# Patient Record
Sex: FEMALE | Race: WHITE | Employment: OTHER | URBAN - METROPOLITAN AREA
[De-identification: names, ages, dates, MRNs, and addresses within clinical notes are randomized per-mention and may not be internally consistent; named-entity substitution may affect disease eponyms.]

---

## 2018-10-26 RX ORDER — IBUPROFEN 200 MG
200 TABLET ORAL AS NEEDED
COMMUNITY

## 2018-10-26 NOTE — PERIOP NOTES
[facility]  Ambulatory Surgery Unit  Pre-operative Instructions    Surgery/Procedure Date  11/1/2018         Tentative Arrival Time 0730      1. On the day of your surgery/procedure, please report to the Ambulatory Surgery Unit Registration Desk and sign in at your designated time. The Ambulatory Surgery Unit is located in [facility] on the [facility] side of the [facility] across from the [address] [place] [address]. Please have all of your health insurance cards and a photo ID. 2. You must have someone with you to drive you home, as you should not drive a car for 24 hours following anesthesia. Please make arrangements for a responsible adult friend or family member to stay with you for at least the first 24 hours after your surgery. 3. Do not have anything to eat or drink (including water, gum, mints, coffee, juice) after 11:59 PM  10/31/18. This may not apply to medications prescribed by your physician. (Please note below the special instructions with medications to take the morning of surgery, if applicable.)    4. We recommend you do not drink any alcoholic beverages for 24 hours before and after your surgery. 5. Contact your surgeons office for instructions on the following medications: non-steroidal anti-inflammatory drugs (i.e. Advil, Aleve), vitamins, and supplements. (Some surgeons will want you to stop these medications prior to surgery and others may allow you to take them)   **If you are currently taking Plavix, Coumadin, Aspirin and/or other blood-thinning agents, contact your surgeon for instructions. ** Your surgeon will partner with the physician prescribing these medications to determine if it is safe to stop or if you need to continue taking. Please do not stop taking these medications without instructions from your surgeon.     6. In an effort to help prevent surgical site infection, we ask that you shower with an anti-bacterial soap (i.e. Dial or Safeguard) for 3 days prior to and on the morning of surgery, using a fresh towel after each shower. (Please begin this process with fresh bed linens.) Do not apply any lotions, powders, or deodorants after the shower on the day of your procedure. If applicable, please do not shave the operative site for 48 hours prior to surgery. 7. Wear comfortable clothes. Wear glasses instead of contacts. Do not bring any jewelry or money (other than copays or fees as instructed). Do not wear make-up, particularly mascara, the morning of your surgery. Do not wear nail polish, particularly if you are having foot /hand surgery. Wear your hair loose or down, no ponytails, buns, santana pins or clips. All body piercings must be removed. 8. You should understand that if you do not follow these instructions your surgery may be cancelled. If your physical condition changes (i.e. fever, cold or flu) please contact your surgeon as soon as possible. 9. It is important that you be on time. If a situation occurs where you may be late, or if you have any questions or problems, please call [phone].    10. Your surgery time may be subject to change. You will receive a phone call the day prior to surgery to confirm your arrival time. 11. Pediatric patients: please bring a change of clothes, diapers, bottle/sippy cup, pacifier, etc.      Special Instructions: Take all medications and inhalers, as prescribed, on the morning of surgery with a sip of water EXCEPT:       Insulin Dependent Diabetic patients: Take your diabetic medications as prescribed the day before surgery. Hold all diabetic medications the day of surgery. If you are scheduled to arrive for surgery after 8:00 AM, and your AM blood sugar is >200, please call Ambulatory Surgery. I understand a pre-operative phone call will be made to verify my surgery time.   In the event that I am not available, I give permission for a message to be left on my answering service and/or with another person?       [phone]    The above note was reviewed with patient during PAT phone call on 10/26/18, patient verbalized understanding.            ___________________      ___________________      ________________  (Signature of Patient)          (Witness)                   (Date and Time)

## 2018-10-31 ENCOUNTER — ANESTHESIA EVENT (OUTPATIENT)
Dept: SURGERY | Age: 78
End: 2018-10-31
Payer: MEDICARE

## 2018-11-01 ENCOUNTER — HOSPITAL ENCOUNTER (OUTPATIENT)
Age: 78
Setting detail: OUTPATIENT SURGERY
Discharge: HOME | End: 2018-11-01
Attending: ORTHOPAEDIC SURGERY | Admitting: ORTHOPAEDIC SURGERY
Payer: MEDICARE

## 2018-11-01 ENCOUNTER — ANESTHESIA (OUTPATIENT)
Dept: SURGERY | Age: 78
End: 2018-11-01
Payer: MEDICARE

## 2018-11-01 VITALS
TEMPERATURE: 97.5 F | RESPIRATION RATE: 12 BRPM | WEIGHT: 144 LBS | SYSTOLIC BLOOD PRESSURE: 110 MMHG | BODY MASS INDEX: 24.59 KG/M2 | HEIGHT: 64 IN | OXYGEN SATURATION: 97 % | DIASTOLIC BLOOD PRESSURE: 76 MMHG | HEART RATE: 77 BPM

## 2018-11-01 DIAGNOSIS — G89.18 POSTOPERATIVE PAIN: Primary | ICD-10-CM

## 2018-11-01 PROCEDURE — 77030028224 HC PDNG CST BSNM -A: Performed by: ORTHOPAEDIC SURGERY

## 2018-11-01 PROCEDURE — 77030020255 HC SOL INJ LR 1000ML BG: Performed by: ORTHOPAEDIC SURGERY

## 2018-11-01 PROCEDURE — 74011000250 HC RX REV CODE- 250: Performed by: ORTHOPAEDIC SURGERY

## 2018-11-01 PROCEDURE — 76210000040 HC AMBSU PH I REC FIRST 0.5 HR: Performed by: ORTHOPAEDIC SURGERY

## 2018-11-01 PROCEDURE — 76030000000 HC AMB SURG OR TIME 0.5 TO 1: Performed by: ORTHOPAEDIC SURGERY

## 2018-11-01 PROCEDURE — 77030002916 HC SUT ETHLN J&J -A: Performed by: ORTHOPAEDIC SURGERY

## 2018-11-01 PROCEDURE — 74011250636 HC RX REV CODE- 250/636: Performed by: ORTHOPAEDIC SURGERY

## 2018-11-01 PROCEDURE — 77030018836 HC SOL IRR NACL ICUM -A: Performed by: ORTHOPAEDIC SURGERY

## 2018-11-01 PROCEDURE — 74011250636 HC RX REV CODE- 250/636

## 2018-11-01 PROCEDURE — 76210000050 HC AMBSU PH II REC 0.5 TO 1 HR: Performed by: ORTHOPAEDIC SURGERY

## 2018-11-01 PROCEDURE — 77030021352 HC CBL LD SYS DISP COVD -B: Performed by: ORTHOPAEDIC SURGERY

## 2018-11-01 PROCEDURE — 77030006689 HC BLD OPHTH BVR BD -A: Performed by: ORTHOPAEDIC SURGERY

## 2018-11-01 PROCEDURE — 76060000061 HC AMB SURG ANES 0.5 TO 1 HR: Performed by: ORTHOPAEDIC SURGERY

## 2018-11-01 PROCEDURE — 74011250636 HC RX REV CODE- 250/636: Performed by: ANESTHESIOLOGY

## 2018-11-01 RX ORDER — MIDAZOLAM HYDROCHLORIDE 1 MG/ML
INJECTION, SOLUTION INTRAMUSCULAR; INTRAVENOUS AS NEEDED
Status: DISCONTINUED | OUTPATIENT
Start: 2018-11-01 | End: 2018-11-01 | Stop reason: HOSPADM

## 2018-11-01 RX ORDER — HYDROMORPHONE HYDROCHLORIDE 1 MG/ML
.2-.5 INJECTION, SOLUTION INTRAMUSCULAR; INTRAVENOUS; SUBCUTANEOUS ONCE
Status: DISCONTINUED | OUTPATIENT
Start: 2018-11-01 | End: 2018-11-01 | Stop reason: HOSPADM

## 2018-11-01 RX ORDER — ONDANSETRON 2 MG/ML
4 INJECTION INTRAMUSCULAR; INTRAVENOUS AS NEEDED
Status: DISCONTINUED | OUTPATIENT
Start: 2018-11-01 | End: 2018-11-01 | Stop reason: HOSPADM

## 2018-11-01 RX ORDER — DIPHENHYDRAMINE HYDROCHLORIDE 50 MG/ML
12.5 INJECTION, SOLUTION INTRAMUSCULAR; INTRAVENOUS AS NEEDED
Status: DISCONTINUED | OUTPATIENT
Start: 2018-11-01 | End: 2018-11-01 | Stop reason: HOSPADM

## 2018-11-01 RX ORDER — PROPOFOL 10 MG/ML
INJECTION, EMULSION INTRAVENOUS
Status: DISCONTINUED | OUTPATIENT
Start: 2018-11-01 | End: 2018-11-01 | Stop reason: HOSPADM

## 2018-11-01 RX ORDER — ONDANSETRON 2 MG/ML
INJECTION INTRAMUSCULAR; INTRAVENOUS AS NEEDED
Status: DISCONTINUED | OUTPATIENT
Start: 2018-11-01 | End: 2018-11-01 | Stop reason: HOSPADM

## 2018-11-01 RX ORDER — SODIUM CHLORIDE, SODIUM LACTATE, POTASSIUM CHLORIDE, CALCIUM CHLORIDE 600; 310; 30; 20 MG/100ML; MG/100ML; MG/100ML; MG/100ML
25 INJECTION, SOLUTION INTRAVENOUS CONTINUOUS
Status: DISCONTINUED | OUTPATIENT
Start: 2018-11-01 | End: 2018-11-01 | Stop reason: HOSPADM

## 2018-11-01 RX ORDER — CLINDAMYCIN PHOSPHATE 900 MG/50ML
900 INJECTION INTRAVENOUS ONCE
Status: COMPLETED | OUTPATIENT
Start: 2018-11-01 | End: 2018-11-01

## 2018-11-01 RX ORDER — SODIUM CHLORIDE 0.9 % (FLUSH) 0.9 %
5-10 SYRINGE (ML) INJECTION AS NEEDED
Status: DISCONTINUED | OUTPATIENT
Start: 2018-11-01 | End: 2018-11-01 | Stop reason: HOSPADM

## 2018-11-01 RX ORDER — FENTANYL CITRATE 50 UG/ML
INJECTION, SOLUTION INTRAMUSCULAR; INTRAVENOUS AS NEEDED
Status: DISCONTINUED | OUTPATIENT
Start: 2018-11-01 | End: 2018-11-01 | Stop reason: HOSPADM

## 2018-11-01 RX ORDER — TRAMADOL HYDROCHLORIDE 50 MG/1
50 TABLET ORAL
Qty: 25 TAB | Refills: 0 | Status: SHIPPED | OUTPATIENT
Start: 2018-11-01 | End: 2021-01-13

## 2018-11-01 RX ORDER — PHENYLEPHRINE HCL IN 0.9% NACL 0.4MG/10ML
SYRINGE (ML) INTRAVENOUS AS NEEDED
Status: DISCONTINUED | OUTPATIENT
Start: 2018-11-01 | End: 2018-11-01 | Stop reason: HOSPADM

## 2018-11-01 RX ORDER — SODIUM CHLORIDE 0.9 % (FLUSH) 0.9 %
5-10 SYRINGE (ML) INJECTION EVERY 8 HOURS
Status: DISCONTINUED | OUTPATIENT
Start: 2018-11-01 | End: 2018-11-01 | Stop reason: HOSPADM

## 2018-11-01 RX ORDER — MORPHINE SULFATE 10 MG/ML
2 INJECTION, SOLUTION INTRAMUSCULAR; INTRAVENOUS
Status: DISCONTINUED | OUTPATIENT
Start: 2018-11-01 | End: 2018-11-01 | Stop reason: HOSPADM

## 2018-11-01 RX ORDER — FENTANYL CITRATE 50 UG/ML
25 INJECTION, SOLUTION INTRAMUSCULAR; INTRAVENOUS
Status: DISCONTINUED | OUTPATIENT
Start: 2018-11-01 | End: 2018-11-01 | Stop reason: HOSPADM

## 2018-11-01 RX ORDER — LIDOCAINE HYDROCHLORIDE 10 MG/ML
0.1 INJECTION, SOLUTION EPIDURAL; INFILTRATION; INTRACAUDAL; PERINEURAL AS NEEDED
Status: DISCONTINUED | OUTPATIENT
Start: 2018-11-01 | End: 2018-11-01 | Stop reason: HOSPADM

## 2018-11-01 RX ORDER — OXYCODONE AND ACETAMINOPHEN 5; 325 MG/1; MG/1
1 TABLET ORAL
Status: DISCONTINUED | OUTPATIENT
Start: 2018-11-01 | End: 2018-11-01 | Stop reason: HOSPADM

## 2018-11-01 RX ORDER — CLINDAMYCIN PHOSPHATE 900 MG/50ML
INJECTION INTRAVENOUS
Status: COMPLETED
Start: 2018-11-01 | End: 2018-11-01

## 2018-11-01 RX ADMIN — ONDANSETRON 4 MG: 2 INJECTION INTRAMUSCULAR; INTRAVENOUS at 09:22

## 2018-11-01 RX ADMIN — Medication 80 MCG: at 09:14

## 2018-11-01 RX ADMIN — FENTANYL CITRATE 25 MCG: 50 INJECTION, SOLUTION INTRAMUSCULAR; INTRAVENOUS at 09:03

## 2018-11-01 RX ADMIN — CLINDAMYCIN PHOSPHATE 900 MG: 18 INJECTION, SOLUTION INTRAVENOUS at 09:00

## 2018-11-01 RX ADMIN — PROPOFOL 50 MCG/KG/MIN: 10 INJECTION, EMULSION INTRAVENOUS at 08:56

## 2018-11-01 RX ADMIN — FENTANYL CITRATE 50 MCG: 50 INJECTION, SOLUTION INTRAMUSCULAR; INTRAVENOUS at 09:14

## 2018-11-01 RX ADMIN — SODIUM CHLORIDE, SODIUM LACTATE, POTASSIUM CHLORIDE, AND CALCIUM CHLORIDE 25 ML/HR: 600; 310; 30; 20 INJECTION, SOLUTION INTRAVENOUS at 07:37

## 2018-11-01 RX ADMIN — Medication 120 MCG: at 09:16

## 2018-11-01 RX ADMIN — Medication 120 MCG: at 09:18

## 2018-11-01 RX ADMIN — MIDAZOLAM HYDROCHLORIDE 1 MG: 1 INJECTION, SOLUTION INTRAMUSCULAR; INTRAVENOUS at 08:50

## 2018-11-01 RX ADMIN — FENTANYL CITRATE 25 MCG: 50 INJECTION, SOLUTION INTRAMUSCULAR; INTRAVENOUS at 09:07

## 2018-11-01 RX ADMIN — MIDAZOLAM HYDROCHLORIDE 1 MG: 1 INJECTION, SOLUTION INTRAMUSCULAR; INTRAVENOUS at 08:53

## 2018-11-01 NOTE — OP NOTES
PATIENT NAME:  Rich Yuen    SURGEON:  Vargas Millan MD    DATE OF SURGERY:  11/1/2018    LOCATION: [facility]    PREOPERATIVE DIAGNOSIS:  Right carpal tunnel syndrome    POSTOPERATIVE DIAGNOSIS:  Same    PROCEDURE: Right Open carpal tunnel decompression             ANESTHESIA:  Local (1% lidocaine with 0.25% bupivicaine in a 1:1 mixture) with sedation     BLOOD LOSS:  Minimal    TOURNIQUET TIME:  13 min    OPERATIVE INDICATIONS: The patient is a 70 y.o. old female who has developed progressive right carpal tunnel syndrome, unresponsive to all conservative treatment. The patient has classic symptoms and signs of median nerve entrapment including median distribution paresthesias and numbness, nocturnal wakening, positive Tinel's sign, and positive wrist flexion test. Symptoms have failed to respond consistently to conservative treatment such that patient has elected to undergo carpal tunnel decompression. She understands the alternatives to surgery, the nature of this elective procedure, the usual recovery, possible variations in healing, and the potential for shortcomings and complications (including but not exclusive to bleeding, infection, scar tenderness,  weakness, residual numbness, or thenar muscle weakness). DESCRIPTION  OF PROCEDURE: Patient identified correctly in the pre-operative holding area and correct extremity marked. Was then taken stable to the operating room and placed supine with the operative extremity on a hand table. After sedation was administered by the anesthesia team, the right hand and forearm were prepped and draped in a sterile field. A timeout was taken and the operative site was confirmed. Local anesthesia was instilled into the wound. The extremity was then elevated and exsanguinated and an upper arm tourniquet was inflated to 250 mm of mercury.  An incision was made in the proximal palm in line with the radial side of the ring finger from the distal wrist crease to Kaplans line. Dissection was carried through the subcutaneous tissue and the transverse carpal ligament was visualized. This was released under direct visualization first distally followed by proximally and carried up past the wrist wrist crease. A complete decompression was confirmed by direct visualization of the decompressed median nerve as well as palpation. No other synovial pathology was noted. The tourniquet was then released. The nerve was noted to become hyperemic. Copious irrigation was performed. Hemostasis was obtained with bipolar cautery and the wound was closed with 3-0 nylon sutures. A sterile dressing was then applied leaving the fingers free for range of motion. The patient tolerated the procedure well and was discharged to the recovery area uneventfully. All instrument needle and lap counts were correct at the end of the case.

## 2018-11-01 NOTE — H&P
This is a 56-year-old right-hand-dominant female who presents today with right equal left numbness in the hands. History of a diagnosis of a carpal tunnel syndrome on the left with a remote injection. Reports numbness in all fingers. Wears gloves at night. Numbness is constant in nature. No history of diabetes. Does have a history of a C-spine infection with a complex surgical history regarding the cervical spine. PMH, PSH, ROS reviewed on intake form     Objective:   Constitutional:  No acute distress. Well nourished. Well developed. Eyes:  Sclera are nonicteric. Respiratory:  No labored breathing. Cardiovascular:  No marked edema. Skin:  No marked skin ulcers. Neurological:  see below  Psychiatric: Alert and oriented x3. Musculoskeletal   Examination of bilateral upper extremities demonstrates she does have decreased sensation in the median nerve distribution. Positive Tinel's and positive Durkan's at the wrist.  Does have thenar atrophy bilaterally. No radicular pain with limited neck motion.     Radiographs:       No imaging obtained      Assessment:      1. Bilateral carpal tunnel syndrome          Plan:   Her constant numbness in thenar atrophy is concerning. We will get an EMG to further evaluate. Did discuss surgical management with her today. We will call her with the EMG results. Addendum: EMG obtained and notable for severe bilateral CTS with signs of axonal degeneration in the APB. R/b/a of open CTR d/w patient and she consents to proceed. Date of Surgery Update:  Melly Sotelo was seen and examined. History and physical has been reviewed. The patient has been examined.  There have been no significant clinical changes since the completion of the originally dated History and Physical.    Signed By: Arlin Ryan MD     November 1, 2018 8:42 AM

## 2018-11-01 NOTE — PERIOP NOTES
Iker uYen  1940  [id number]    Situation:  Verbal report given from: Spike Conklin RN  Procedure: Procedure(s):  RIGHT OPEN CARPAL TUNNEL RELEASE (MAC WITH LOCAL)    Background:    Preoperative diagnosis: BILATERAL CARPAL TUNNEL SYNDROME    Postoperative diagnosis: BILATERAL CARPAL TUNNEL SYNDROME    :  Dr. Miah Feliciano    Assistant(s): Circ-1: Jimbo Salomon RN  Scrub Tech-1: Genette Harada    Specimens: * No specimens in log *    Assessment:  Intra-procedure medications         Anesthesia gave intra-procedure sedation and medications, see anesthesia flow sheet     Intravenous fluids: LR@ KVO     Vital signs stable       Recommendation:

## 2018-11-01 NOTE — PERIOP NOTES
Permission received to review discharge instructions and discuss private health information with Tiffanie Beckman, . 0940- at bedside, updated on pt's status. VSS. Pt denies pain    0944-Pt tolerating ginger ale w/o difficulty. Pt continues to deny pain. VSS    1001-D/c instructions reviewed, all questions answered. VSS, pt continues to deny pain. 1015-Transported via w/c to awaiting transportation.

## 2018-11-01 NOTE — ANESTHESIA PREPROCEDURE EVALUATION
Anesthetic History No history of anesthetic complications Review of Systems / Medical History Patient summary reviewed, nursing notes reviewed and pertinent labs reviewed Pulmonary Within defined limits Neuro/Psych Within defined limits Cardiovascular Exercise tolerance: >4 METS 
  
GI/Hepatic/Renal 
Within defined limits Endo/Other Arthritis Other Findings Comments: H/o cervical fusion d/t infected bone Physical Exam 
 
Airway Mallampati: III 
TM Distance: 4 - 6 cm Neck ROM: decreased range of motion Mouth opening: Normal 
 
 Cardiovascular Rhythm: regular Rate: normal 
 
 
 
 Dental 
No notable dental hx Pulmonary Breath sounds clear to auscultation Abdominal 
GI exam deferred Other Findings Anesthetic Plan ASA: 2 Anesthesia type: MAC Induction: Intravenous Anesthetic plan and risks discussed with: Patient

## 2018-11-01 NOTE — PERIOP NOTES
Permission received to review discharge instructions and discuss private health information with Jose De Jesus Piper, .

## 2018-11-01 NOTE — ANESTHESIA POSTPROCEDURE EVALUATION
Procedure(s): RIGHT OPEN CARPAL TUNNEL RELEASE (MAC WITH LOCAL). Anesthesia Post Evaluation Multimodal analgesia: multimodal analgesia used between 6 hours prior to anesthesia start to PACU discharge Patient location during evaluation: bedside Patient participation: complete - patient participated Level of consciousness: awake and alert Pain score: 0 Airway patency: patent Anesthetic complications: no 
Cardiovascular status: hemodynamically stable Respiratory status: room air and spontaneous ventilation Hydration status: acceptable Visit Vitals /76 (BP 1 Location: Left arm, BP Patient Position: At rest) Pulse 77 Temp 36.4 °C (97.5 °F) Resp 12 Ht 5' 4\" (1.626 m) Wt 65.3 kg (144 lb) SpO2 97% BMI 24.72 kg/m²

## 2020-09-30 ENCOUNTER — OFFICE VISIT (OUTPATIENT)
Dept: INTERNAL MEDICINE CLINIC | Age: 80
End: 2020-09-30
Payer: MEDICARE

## 2020-09-30 VITALS
RESPIRATION RATE: 16 BRPM | DIASTOLIC BLOOD PRESSURE: 70 MMHG | BODY MASS INDEX: 23.52 KG/M2 | SYSTOLIC BLOOD PRESSURE: 126 MMHG | OXYGEN SATURATION: 95 % | WEIGHT: 137 LBS | HEART RATE: 89 BPM | TEMPERATURE: 97.1 F

## 2020-09-30 DIAGNOSIS — G31.84 MILD COGNITIVE IMPAIRMENT: ICD-10-CM

## 2020-09-30 DIAGNOSIS — Z78.0 POST-MENOPAUSAL: ICD-10-CM

## 2020-09-30 DIAGNOSIS — Z12.2 ENCOUNTER FOR SCREENING FOR LUNG CANCER: ICD-10-CM

## 2020-09-30 DIAGNOSIS — R73.02 IMPAIRED GLUCOSE TOLERANCE: ICD-10-CM

## 2020-09-30 DIAGNOSIS — Z87.891 FORMER SMOKER: ICD-10-CM

## 2020-09-30 DIAGNOSIS — E55.9 VITAMIN D DEFICIENCY: Primary | ICD-10-CM

## 2020-09-30 DIAGNOSIS — Z12.11 ENCOUNTER FOR COLORECTAL CANCER SCREENING: ICD-10-CM

## 2020-09-30 DIAGNOSIS — E78.01 FAMILIAL HYPERCHOLESTEREMIA: ICD-10-CM

## 2020-09-30 DIAGNOSIS — Z12.12 ENCOUNTER FOR COLORECTAL CANCER SCREENING: ICD-10-CM

## 2020-09-30 DIAGNOSIS — Z12.31 BREAST CANCER SCREENING BY MAMMOGRAM: ICD-10-CM

## 2020-09-30 DIAGNOSIS — R68.89 OTHER GENERAL SYMPTOMS AND SIGNS: ICD-10-CM

## 2020-09-30 DIAGNOSIS — R53.82 CHRONIC FATIGUE: ICD-10-CM

## 2020-09-30 DIAGNOSIS — Z79.2 PROPHYLACTIC ANTIBIOTIC: ICD-10-CM

## 2020-09-30 LAB
25(OH)D3 SERPL-MCNC: 39 NG/ML (ref 30–96)
A-G RATIO,AGRAT: 1.6 RATIO
ALBUMIN SERPL-MCNC: 4.4 G/DL (ref 3.9–5.4)
ALP SERPL-CCNC: 76 U/L (ref 38–126)
ALT SERPL-CCNC: 21 U/L (ref 0–35)
ANION GAP SERPL CALC-SCNC: 10 MMOL/L
AST SERPL W P-5'-P-CCNC: 28 U/L (ref 14–36)
BILIRUB SERPL-MCNC: 0.5 MG/DL (ref 0.2–1.3)
BUN SERPL-MCNC: 23 MG/DL (ref 7–17)
BUN/CREATININE RATIO,BUCR: 33 RATIO
CALCIUM SERPL-MCNC: 9.7 MG/DL (ref 8.4–10.2)
CHLORIDE SERPL-SCNC: 106 MMOL/L (ref 98–107)
CHOL/HDL RATIO,CHHD: 2 RATIO (ref 0–4)
CHOLEST SERPL-MCNC: 231 MG/DL (ref 0–200)
CO2 SERPL-SCNC: 28 MMOL/L (ref 22–32)
CREAT SERPL-MCNC: 0.7 MG/DL (ref 0.7–1.2)
ERYTHROCYTE [DISTWIDTH] IN BLOOD BY AUTOMATED COUNT: 12.7 %
GLOBULIN,GLOB: 2.8
GLUCOSE SERPL-MCNC: 121 MG/DL (ref 65–105)
HBA1C MFR BLD HPLC: 5.8 % (ref 4–5.7)
HCT VFR BLD AUTO: 47.5 % (ref 37–51)
HDLC SERPL-MCNC: 93 MG/DL (ref 35–130)
HGB BLD-MCNC: 15.5 G/DL (ref 12–18)
LDL/HDL RATIO,LDHD: 1 RATIO
LDLC SERPL CALC-MCNC: 106 MG/DL (ref 0–130)
LYMPHOCYTES ABSOLUTE: 2.1 K/UL (ref 0.6–4.1)
LYMPHOCYTES NFR BLD: 26 % (ref 10–58.5)
MCH RBC QN AUTO: 30.8 PG (ref 26–32)
MCHC RBC AUTO-ENTMCNC: 32.6 G/DL (ref 30–36)
MCV RBC AUTO: 94.2 FL (ref 80–97)
MONOCYTES ABS-DIF,2141: 0.7 K/UL (ref 0–1.8)
MONOCYTES NFR BLD: 8.3 % (ref 0.1–24)
NEUTROPHILS # BLD: 65.7 % (ref 37–92)
NEUTROPHILS ABS,2156: 5.2 K/UL (ref 2–7.8)
PLATELET # BLD AUTO: 277 K/UL (ref 140–440)
POTASSIUM SERPL-SCNC: 4.5 MMOL/L (ref 3.6–5)
PROT SERPL-MCNC: 7.2 G/DL (ref 6.3–8.2)
RBC # BLD AUTO: 5.04 M/UL (ref 4.2–6.3)
SODIUM SERPL-SCNC: 144 MMOL/L (ref 137–145)
TRIGL SERPL-MCNC: 158 MG/DL (ref 0–200)
TSH SERPL DL<=0.05 MIU/L-ACNC: 1.22 UIU/ML (ref 0.34–5.6)
VLDLC SERPL CALC-MCNC: 32 MG/DL
WBC # BLD AUTO: 7.9 K/UL (ref 4.1–10.9)

## 2020-09-30 PROCEDURE — 1100F PTFALLS ASSESS-DOCD GE2>/YR: CPT | Performed by: INTERNAL MEDICINE

## 2020-09-30 PROCEDURE — G0444 DEPRESSION SCREEN ANNUAL: HCPCS | Performed by: INTERNAL MEDICINE

## 2020-09-30 PROCEDURE — 80053 COMPREHEN METABOLIC PANEL: CPT | Performed by: INTERNAL MEDICINE

## 2020-09-30 PROCEDURE — 3288F FALL RISK ASSESSMENT DOCD: CPT | Performed by: INTERNAL MEDICINE

## 2020-09-30 PROCEDURE — G8510 SCR DEP NEG, NO PLAN REQD: HCPCS | Performed by: INTERNAL MEDICINE

## 2020-09-30 PROCEDURE — 85025 COMPLETE CBC W/AUTO DIFF WBC: CPT | Performed by: INTERNAL MEDICINE

## 2020-09-30 PROCEDURE — G8427 DOCREV CUR MEDS BY ELIG CLIN: HCPCS | Performed by: INTERNAL MEDICINE

## 2020-09-30 PROCEDURE — 1090F PRES/ABSN URINE INCON ASSESS: CPT | Performed by: INTERNAL MEDICINE

## 2020-09-30 PROCEDURE — G8400 PT W/DXA NO RESULTS DOC: HCPCS | Performed by: INTERNAL MEDICINE

## 2020-09-30 PROCEDURE — G8420 CALC BMI NORM PARAMETERS: HCPCS | Performed by: INTERNAL MEDICINE

## 2020-09-30 PROCEDURE — 84443 ASSAY THYROID STIM HORMONE: CPT | Performed by: INTERNAL MEDICINE

## 2020-09-30 PROCEDURE — 80061 LIPID PANEL: CPT | Performed by: INTERNAL MEDICINE

## 2020-09-30 PROCEDURE — G8536 NO DOC ELDER MAL SCRN: HCPCS | Performed by: INTERNAL MEDICINE

## 2020-09-30 PROCEDURE — 83036 HEMOGLOBIN GLYCOSYLATED A1C: CPT | Performed by: INTERNAL MEDICINE

## 2020-09-30 PROCEDURE — 99215 OFFICE O/P EST HI 40 MIN: CPT | Performed by: INTERNAL MEDICINE

## 2020-09-30 PROCEDURE — 82306 VITAMIN D 25 HYDROXY: CPT | Performed by: INTERNAL MEDICINE

## 2020-09-30 RX ORDER — CLINDAMYCIN HYDROCHLORIDE 150 MG/1
600 CAPSULE ORAL ONCE
Qty: 4 CAP | Refills: 0 | Status: SHIPPED | OUTPATIENT
Start: 2020-09-30 | End: 2020-09-30

## 2020-09-30 NOTE — PROGRESS NOTES
Kassi Rivas is a 70 y.o. female and presents with Establish Care      Subjective:  Mayo Zapien is a new patient to our practice. She is here to establish care. 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 She is in attendance with her granddaughter Cuco Paula. Patient reports she has not seen a family physician for over 10 years now. She has not had routine screenings done and is long overdue for her routine mammogram, Pap smear, colonoscopy, DEXA scan. She has not had lab work done in a long while. She has noticed some mild cognitive impairment and issues with short-term memory loss. She has been taking PreviDent. She has not seen a neurologist.  No head CT or MRI was ever done. She is still driving and pretty independent in caring her ADLs. She had a fall in August and hurt her right arm and right side of her leg. Denies any fractures. She still has some pain with flexion extension and restricted range of motion. Past Medical History:   Diagnosis Date    Arthritis     bilateral hands, left knee - pt got injections    Carpal tunnel syndrome     bilateral hands    Dental infection ~1998    progressed to back of neck, pt had sx that consisted of taking bone from her right hip to replace in her spinal coloum; pt in halo for 3 months     Past Surgical History:   Procedure Laterality Date    HX CARPAL TUNNEL RELEASE Right 11/01/2018    open    HX HYSTERECTOMY      HX TONSILLECTOMY      HX UROLOGICAL  ~1998    Back surgery:  pt reports she had dental inf. that went to back of neck and ortho surgeon took pieces of bone from right hip to replace in spinal column; pt reports she wore halo for 3 months:  Dr. George Cartagena (sx done at [facility])     Allergies   Allergen Reactions    Penicillins Shortness of Breath and Rash     Current Outpatient Medications   Medication Sig Dispense Refill    OTHER daily. Prevagen for memory      clindamycin (CLEOCIN) 150 mg capsule Take 4 Caps by mouth once for 1 dose. 30-60 minutes prior to dental procedure. 4 Cap 0    ibuprofen (ADVIL) 200 mg tablet Take 200 mg by mouth as needed for Pain.  traMADol (ULTRAM) 50 mg tablet Take 1 Tab by mouth every six (6) hours as needed for Pain. Max Daily Amount: 200 mg. 25 Tab 0     Social History     Socioeconomic History    Marital status:      Spouse name: Not on file    Number of children: Not on file    Years of education: Not on file    Highest education level: Not on file   Tobacco Use    Smoking status: Former Smoker     Last attempt to quit:      Years since quittin.7    Smokeless tobacco: Never Used   Substance and Sexual Activity    Alcohol use: Yes     Alcohol/week: 3.0 standard drinks     Types: 3 Glasses of wine per week    Drug use: No     Family History   Problem Relation Age of Onset    Cancer Daughter     Parkinson's Disease Mother        Review of Systems  ROS is unremarkable except for ones highlighted in bold.    Constitutional: negative for fevers, chills, anorexia and weight loss  Eyes:   negative for visual disturbance and irritation  ENT:   negative for tinnitus,sore throat,nasal congestion,ear pain,hoarseness  Respiratory:  negative for cough, hemoptysis, dyspnea,wheezing  CV:   negative for chest pain, palpitations, lower extremity edema  GI:   negative for nausea, vomiting, diarrhea, abdominal pain,melena  Endo:               negative for polyuria,polydipsia,polyphagia,heat intolerance  Genitourinary: negative for frequency, dysuria and hematuria  Integumentary: negative for rash and pruritus  Hematologic:  negative for easy bruising and gum/nose bleeding  Musculoskel: negative for myalgias, arthralgias, back pain, muscle weakness, joint pain  Neurological:  negative for headaches, dizziness, vertigo, memory problems and gait   Behavl/Psych: negative for feelings of anxiety, depression, mood changes  ROS otherwise negative     Objective:  Visit Vitals  /70 (BP 1 Location: Left arm, BP Patient Position: Sitting)   Pulse 89   Temp 97.1 °F (36.2 °C)   Resp 16   Wt 137 lb (62.1 kg)   SpO2 95%   BMI 23.52 kg/m²     Physical Exam:   General appearance - alert, well appearing, and in no distress  Mental status - alert, oriented to person, place, and time  EYE-AMELIE, EOMI, fundi normal, corneas normal, no foreign bodies  ENT-ENT exam normal, no neck nodes or sinus tenderness  Nose - normal and patent, no erythema, discharge or polyps  Mouth - mucous membranes moist, pharynx normal without lesions  Neck - supple, no significant adenopathy   Chest - clear to auscultation, no wheezes, rales or rhonchi, symmetric air entry   Heart - normal rate, regular rhythm, normal S1, S2, no murmurs, rubs, clicks or gallops   Abdomen - soft, nontender, nondistended, no masses or organomegaly  Lymph- no adenopathy palpable  Ext-peripheral pulses normal, no pedal edema, no clubbing or cyanosis  Skin-Warm and dry. no hyperpigmentation, vitiligo, or suspicious lesions  Neuro -alert, oriented, normal speech, no focal findings or movement disorder noted  Musculoskeletal- FROM, no bony abnormalities, right hand point tenderness,    Lab Review:  No results found for this or any previous visit. Documenation Review:    Assessment/Plan:    Diagnoses and all orders for this visit:    1. Vitamin D deficiency  -     VITAMIN D, 25 HYDROXY    2. Familial hypercholesteremia  -     LIPID PANEL    3. Impaired glucose tolerance  -     HEMOGLOBIN A1C W/O EAG    4. Chronic fatigue  -     CBC WITH AUTOMATED DIFF  -     METABOLIC PANEL, COMPREHENSIVE  -     TSH 3RD GENERATION    5. Post-menopausal  -     DEXA BONE DENSITY STUDY AXIAL; Future  -     REFERRAL TO OBSTETRICS AND GYNECOLOGY    6. Breast cancer screening by mammogram  -     [facility] MAMMO BI SCREENING INCL CAD; Future    7.  Encounter for colorectal cancer screening  -     REFERRAL TO COLON AND RECTAL SURGERY    8. Prophylactic antibiotic  -     clindamycin (CLEOCIN) 150 mg capsule; Take 4 Caps by mouth once for 1 dose. 30-60 minutes prior to dental procedure. 9. Encounter for screening for lung cancer  -     CT LOW DOSE LUNG CANCER SCREENING; Future    10. Former smoker  -     CT LOW DOSE LUNG CANCER SCREENING; Future    11. Mild cognitive impairment  -     VITAMIN B12    12. Other general symptoms and signs   -     VITAMIN B12        More than 60  minutes of time was spent in direct patient care, out of which >50% of time was spent on coordination of care, counseling on diagnosis, management and treatment of the diagnosis. I did discuss about the need for all screening and immunization needs. Orders and referral placed. All the above. She also has been having short-term memory loss/mild cognitive impairment. Discussed with both patient and her granddaughter. Will do baseline labs and work-up. She voiced understanding. I have instructed her to take as needed Advil/Aleve for pain in her right hand and right leg. She will certainly call me back in 2 weeks if her symptoms have not improved. We can entertain x-rays at that point. I will call with lab results and make further recommendations or adjustments if necessary. Discussed lifestyle modifications including Na restriction, low carb/fat diet, weight reduction and exercise (at least a walking program). We will rule out metabolic causes for mild cognitive impairment. If unremarkable, might benefit from neuropsych testing/neurology evaluation. ICD-10-CM ICD-9-CM    1. Vitamin D deficiency  E55.9 268.9 VITAMIN D, 25 HYDROXY   2. Familial hypercholesteremia  E78.01 272.0 LIPID PANEL   3. Impaired glucose tolerance  R73.02 790.22 HEMOGLOBIN A1C W/O EAG   4. Chronic fatigue  R53.82 780.79 CBC WITH AUTOMATED DIFF      METABOLIC PANEL, COMPREHENSIVE      TSH 3RD GENERATION   5.  Post-menopausal  Z78.0 V49.81 DEXA BONE DENSITY STUDY AXIAL      REFERRAL TO OBSTETRICS AND GYNECOLOGY   6. Breast cancer screening by mammogram  Z12.31 V76.12 [facility] MAMMO BI SCREENING INCL CAD   7. Encounter for colorectal cancer screening  Z12.11 V76.51 REFERRAL TO COLON AND RECTAL SURGERY    Z12.12 V76.41    8. Prophylactic antibiotic  Z79.2 V58.62 clindamycin (CLEOCIN) 150 mg capsule   9. Encounter for screening for lung cancer  Z12.2 V76.0 CT LOW DOSE LUNG CANCER SCREENING   10. Former smoker  Z87.891 V15.82 CT LOW DOSE LUNG CANCER SCREENING   11. Mild cognitive impairment  G31.84 331.83 VITAMIN B12   12. Other general symptoms and signs   R68.89 780.99 VITAMIN B12             I have reviewed with the patient details of the assessment and plan and all questions were answered. Relevent patient education was performed. Verbal and/or written instructions (see AVS) provided. The most recent lab findings were reviewed with the patient. Plan was discussed with patient who verbally expressed understanding. An After Visit Summary was printed and given to the patient.     Shakir Diop MD

## 2020-09-30 NOTE — PATIENT INSTRUCTIONS

## 2020-09-30 NOTE — PROGRESS NOTES
Feng Arce is a 70 y.o. female presenting for Establish Care  . 1. Have you been to the ER, urgent care clinic since your last visit? Hospitalized since your last visit? No    2. Have you seen or consulted any other health care providers outside of the [address] [place] [address] since your last visit? Include any pap smears or colon screening. No    Fall Risk Assessment, last 12 mths 9/30/2020   Able to walk? Yes   Fall in past 12 months? Yes   Fall with injury? Yes   Number of falls in past 12 months 1   Fall Risk Score 2         Abuse Screening Questionnaire 9/30/2020   Do you ever feel afraid of your partner? N   Are you in a relationship with someone who physically or mentally threatens you? N   Is it safe for you to go home? Y       3 most recent PHQ Screens 9/30/2020   Little interest or pleasure in doing things Not at all   Feeling down, depressed, irritable, or hopeless Not at all   Total Score PHQ 2 0       There are no discontinued medications.

## 2020-10-01 LAB — VIT B12 SERPL-MCNC: 363 PG/ML (ref 232–1245)

## 2020-10-08 ENCOUNTER — TELEPHONE (OUTPATIENT)
Dept: INTERNAL MEDICINE CLINIC | Age: 80
End: 2020-10-08
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
## 2020-10-08 DIAGNOSIS — M79.601 RIGHT ARM PAIN: ICD-10-CM

## 2020-10-08 DIAGNOSIS — F03.90 DEMENTIA WITHOUT BEHAVIORAL DISTURBANCE, UNSPECIFIED DEMENTIA TYPE: ICD-10-CM

## 2020-10-08 NOTE — TELEPHONE ENCOUNTER
Patient is also still complaining of her arm hurting after her fall in august. The granddaughter wants to know if that will show on the bone density or if she can get an xray scheduled for the same day as her bone density.   CB: [phone]

## 2020-10-08 NOTE — TELEPHONE ENCOUNTER
MD Ingrid Dupree LPN 53 minutes ago (7:67 PM)       Upon review of record, patient had a fall on the right side of the arm.  On examination did not feel like she had a fracture.  However, will get x-ray of the right forearm if the patient's pain is worse.  She needs to take tramadol and Advil as needed. For dementia concerns I have placed a consult for neurology Dr. Lorraine Manzano schedule appointment. Message text      Patients granddaughter notified and xray scheduled.

## 2020-10-08 NOTE — TELEPHONE ENCOUNTER
Patient's granddaughter called expressing concerns about the patient. The family feels that she needs to be evaluated for dementia. The patient has progressively gotten worse. She has short term memory loss. It used to be week to week she would have this issue now she has memory loss mid-sentence. She is also becoming angry with her family and friends when this is mentioned to her. Her granddaughter reports the patient getting angry with her spouse when they have plans. The patient doesn't remember making these plans and gets angry because it disrupts her day to day routine. They had an inccident recently where the patient got angry and left the house on foot. The patient refused water (on a hot day) or to allow someone to pick her up in the car. The family states they want her evaluated as soon as they can. The granddaughter did not mention this to Dr. Chelsea Pires during the visit because of how upset the patient becomes and right now the granddaughter is the only person the patient trusts. PLEASE CALL THE NUMBER BELOW RELATED TO THIS.   CB: [phone]

## 2020-10-16 ENCOUNTER — HOSPITAL ENCOUNTER (OUTPATIENT)
Dept: CT IMAGING | Age: 80
Discharge: HOME | End: 2020-10-16
Attending: INTERNAL MEDICINE
Payer: MEDICARE

## 2020-10-16 DIAGNOSIS — Z87.891 FORMER SMOKER: ICD-10-CM

## 2020-10-16 DIAGNOSIS — Z12.2 ENCOUNTER FOR SCREENING FOR LUNG CANCER: ICD-10-CM

## 2020-10-16 PROCEDURE — G0297 LDCT FOR LUNG CA SCREEN: HCPCS

## 2020-10-18 DIAGNOSIS — K76.89 LIVER NODULE: Primary | ICD-10-CM

## 2020-10-18 NOTE — PROGRESS NOTES
CT revealed 2 pulmonary nodules. Given her smoking history we will repeat CT scan in 6 months. Liver shows some calcifications. She will require CT liver. Order placed. Please inform patient.

## 2020-10-19 DIAGNOSIS — I25.10 CORONARY ARTERY DISEASE DUE TO LIPID RICH PLAQUE: Primary | ICD-10-CM

## 2020-10-19 DIAGNOSIS — I25.83 CORONARY ARTERY DISEASE DUE TO LIPID RICH PLAQUE: Primary | ICD-10-CM

## 2020-10-19 RX ORDER — ATORVASTATIN CALCIUM 40 MG/1
40 TABLET, FILM COATED ORAL DAILY
Qty: 90 TAB | Refills: 0 | Status: SHIPPED | OUTPATIENT
Start: 2020-10-19 | End: 2021-02-01

## 2020-10-23 ENCOUNTER — HOSPITAL ENCOUNTER (OUTPATIENT)
Dept: CT IMAGING | Age: 80
Discharge: HOME | End: 2020-10-23
Attending: INTERNAL MEDICINE
Payer: MEDICARE

## 2020-10-23 DIAGNOSIS — K76.89 LIVER NODULE: ICD-10-CM

## 2020-10-23 PROCEDURE — 74011000636 HC RX REV CODE- 636: Performed by: INTERNAL MEDICINE

## 2020-10-23 PROCEDURE — 74170 CT ABD WO CNTRST FLWD CNTRST: CPT

## 2020-10-23 RX ORDER — SODIUM CHLORIDE 0.9 % (FLUSH) 0.9 %
10 SYRINGE (ML) INJECTION
Status: COMPLETED | OUTPATIENT
Start: 2020-10-23 | End: 2020-10-23

## 2020-10-23 RX ADMIN — Medication 10 ML: at 15:03

## 2020-10-23 RX ADMIN — IOPAMIDOL 100 ML: 755 INJECTION, SOLUTION INTRAVENOUS at 15:03

## 2020-10-26 DIAGNOSIS — K63.89 HEPATIC FLEXURE MASS: Primary | ICD-10-CM

## 2020-11-05 ENCOUNTER — OFFICE VISIT (OUTPATIENT)
Dept: HEMATOLOGY | Age: 80
End: 2020-11-05
Payer: MEDICARE

## 2020-11-05 VITALS
HEART RATE: 85 BPM | BODY MASS INDEX: 23.39 KG/M2 | SYSTOLIC BLOOD PRESSURE: 135 MMHG | TEMPERATURE: 97.2 F | DIASTOLIC BLOOD PRESSURE: 80 MMHG | OXYGEN SATURATION: 93 % | WEIGHT: 137 LBS | HEIGHT: 64 IN | RESPIRATION RATE: 18 BRPM

## 2020-11-05 DIAGNOSIS — R97.1 ELEVATED CANCER ANTIGEN 125 (CA 125): ICD-10-CM

## 2020-11-05 DIAGNOSIS — R16.0 LIVER MASS: Primary | ICD-10-CM

## 2020-11-05 DIAGNOSIS — C18.3 MALIGNANT NEOPLASM OF HEPATIC FLEXURE (HCC): ICD-10-CM

## 2020-11-05 DIAGNOSIS — Z11.59 ENCOUNTER FOR SCREENING FOR OTHER VIRAL DISEASES: ICD-10-CM

## 2020-11-05 DIAGNOSIS — C78.7 SECONDARY MALIGNANT NEOPLASM OF LIVER AND INTRAHEPATIC BILE DUCT (HCC): ICD-10-CM

## 2020-11-05 PROBLEM — R41.89 COGNITIVE IMPAIRMENT: Status: ACTIVE | Noted: 2020-11-05

## 2020-11-05 PROCEDURE — 99205 OFFICE O/P NEW HI 60 MIN: CPT | Performed by: HOSPITALIST

## 2020-11-05 PROCEDURE — G0463 HOSPITAL OUTPT CLINIC VISIT: HCPCS | Performed by: HOSPITALIST

## 2020-11-05 NOTE — LETTER
11/17/20 Patient: Sandra Case YOB: 1940 Date of Visit: 11/5/2020 Joshua Garnica MD 
[address] [address] VIA In Basket Dear Joshua Garnica MD, Thank you for referring Ms. Benedicto Mccoy to [address] for evaluation. My notes for this consultation are attached. If you have questions, please do not hesitate to call me. I look forward to following your patient along with you. Sincerely, Estella Arciniega MD

## 2020-11-05 NOTE — PROGRESS NOTES
Justin Johnson MD, [address], [place] Curtis Olson, Dax Calixto MD, MPH      Erica Gupta, PACASSY Brewer, [facility]     Maryellen Nunez, [facility]   Nereida Agustin, P-C    Ananya Hu, [facility]       [facility] Jonas De [address]    at [address], [facility] Sindi Uribe  22.    [phone]    FAX: [address] [place] [address], [address]    [phone]    FAX: [phone]     Patient Care Team:  Severiano Willis MD as PCP - General (Hospitalist)  Severiano Willis MD as PCP - [facility]    Problem List  Date Reviewed: 9/30/2020          Codes Class Noted    Liver mass ICD-10-CM: R16.0  ICD-9-CM: 573.8  11/5/2020        Cognitive impairment ICD-10-CM: R41.89  ICD-9-CM: 294.9  11/5/2020            The clinicians listed above have asked me to see Kyung Arrington in consultation regarding a liver mass. All medical records sent by the referring physicians were reviewed including imaging studies     The patient is a 68 y.o.  female without any history of previous liver disease. The patient underwent low dose CT scan of the chest for lung cancer screening in 09/2020 which incidentally noted a coarse area of calcification in the right lobe of the liver. Subsequent CT scan of the liver performed in 10/2020 demonstrated Indeterminate coarse calcification containing hypodense lesion in the right lobe liver with features compatible with epithelioid  hemangioendothelioma or other neoplasm such as metastatic mucinous carcinoma. A second lesion in anterior segment 2 could reflect flash fill hemangioma or very small lesion of same etiology.  There was also a 1. 2 pulmonary nodules measuring 7 x 5 mm    Patient does not have a history of extrahepatic malignancy. She has not seen a primary care physician for over 10 years and has not had routine screening done including colonoscopy, PAP smear or mammogram.     Recent LFT's performed were normal.     A biopsy of the liver mass has not been done    The patient has no symptoms which can be attributed to the liver disorder. The patient is not currently experiencing the following symptoms of liver disease such as fatigue, ascites, jaundice, lower extremity edema, hematemesis or hematochezia. She reports she may have lost weight. The patient completes all daily activities without any functional limitations. Health maintenance  [facility] liver 10/2020: coarse calcification containing hypodense lesion in the right lobe of the liver  Hepatitis A serology sent  Hepatitis B serology sent  Influenza: Recommended this occur late into each fall  Pneumonia: Prevnar followed by Pneumovax after 8 weeks    ASSESSMENT AND PLAN:  Liver mass   There is a coarse calcification hypodense lesion in the right hepatic lobe. This is of unknown etiology at this time. The patient is asymptomatic   Laboratory studies suggest the patient has no evidence of liver disease. Have performed laboratory testing to monitor liver function and degree of liver injury. This included BMP, hepatic panel, CBC with platelet count, INR. Will measure the following serologic cancer markers AFP, CA 19-9, CEA,   Serologic testing for causes of chronic liver disease will be ordered including hepatitis B and C virus  We will schedule patient for imaging guided biopsy of liver mass for histological diagnosis of liver mass. Treatment of other medical problems in patients with chronic liver disease  There are no contraindications for the patient to take most medications that are necessary for treatment of other medical issues. The patient does not comsume alcohol on a daily basis.     Vaccinations The need for vaccination against viral hepatitis A and B will be assessed with serologic and instituted as appropriate. Routine vaccinations against other bacterial and viral agents can be performed as indicated. Annual flu vaccination should be administered if indicated. ALLERGIES  Allergies   Allergen Reactions    Penicillins Shortness of Breath and Rash       MEDICATIONS  Current Outpatient Medications   Medication Sig    COQ10, LIPOSOMAL UBIQUINOL, PO Take  by mouth.  OTHER daily. Prevagen for memory    ibuprofen (ADVIL) 200 mg tablet Take 200 mg by mouth as needed for Pain.  atorvastatin (LIPITOR) 40 mg tablet Take 1 Tab by mouth daily for 90 days. Indications: hardening of the arteries due to plaque buildup    traMADol (ULTRAM) 50 mg tablet Take 1 Tab by mouth every six (6) hours as needed for Pain. Max Daily Amount: 200 mg. No current facility-administered medications for this visit. SYSTEM REVIEW NOT RELATED TO LIVER DISEASE OR REVIEWED ABOVE:  Constitution systems: Negative for fever, chills, weight gain, weight loss. Eyes: Negative for visual changes. ENT: Negative for sore throat, painful swallowing. Respiratory: Negative for cough, hemoptysis, SOB. Cardiology: Negative for chest pain, palpitations. GI:  Negative for constipation or diarrhea. : Negative for urinary frequency, dysuria, hematuria, nocturia. Skin: Negative for rash. Hematology: Negative for easy bruising, blood clots. Musculo-skelatal: Negative for back pain, muscle pain, weakness. Neurologic: Negative for headaches, dizziness, vertigo, memory problems not related to HE. Psychology: Negative for anxiety, depression. FAMILY HISTORY:  The parents are   There is no family history of liver disease. Daughter has SLE    SOCIAL HISTORY:  The patient is . The patient has 3 children.  8 grand chilrden and 8 great grandchildren    The patient stopped using tobacco products in 2016. She smoked for about 30-40 years    The patient drinks wine socially    PHYSICAL EXAMINATION:  Visit Vitals  /80 (BP 1 Location: Right arm, BP Patient Position: Sitting)   Pulse 85   Temp 97.2 °F (36.2 °C) (Temporal)   Resp 18   Ht 5' 4\" (1.626 m)   Wt 137 lb (62.1 kg)   SpO2 93%   BMI 23.52 kg/m²     General: No acute distress. Eyes: Sclera anicteric. ENT: No oral lesions. Thyroid normal.  Nodes: No adenopathy. Skin: No spider angiomata. No jaundice. No palmar erythema. Respiratory: Lungs clear to auscultation. Cardiovascular: Regular heart rate. No murmurs. No JVD. Abdomen: Soft non-tender. Liver size normal to percussion/palpation. Spleen not palpable. No obvious ascites. Extremities: No edema. No muscle wasting. No gross arthritic changes. Neurologic: Alert and oriented. Cranial nerves grossly intact. No asterixis. LABORATORY STUDIES:  Recent liver function panel, CBC with platelet count and BMP are not available. These studies will be performed. Liver [place] of [address] Units 11/5/2020 9/30/2020   WBC 3.4 - 10.8 x10E3/uL 6.4 7.9   ANC 1.4 - 7.0 x10E3/uL 3.3    HGB 11.1 - 15.9 g/dL 16.3 (H) 15.5    - 450 x10E3/uL 293 277.0   INR 0.9 - 1.2 1.0    AST 0 - 40 IU/L 19 28.0   ALT 0 - 32 IU/L 19 21   Alk Phos 39 - 117 IU/L 87 76   Bili, Total 0.0 - 1.2 mg/dL 0.2 0.5   Albumin 3.7 - 4.7 g/dL 4.8 (H) 4.4   BUN 8 - 27 mg/dL 21 23.0 (H)   Creat 0.57 - 1.00 mg/dL 0.72 0.7   Na 134 - 144 mmol/L 142 144   K 3.5 - 5.2 mmol/L 4.6 4.5   Cl 96 - 106 mmol/L 103 106   CO2 20 - 29 mmol/L 24 28.0   Glucose 65 - 99 mg/dL 119 (H) 121 (H)       SEROLOGIES:  Not available or performed. Testing was performed today.     LIVER HISTOLOGY:  Not available or performed    ENDOSCOPIC PROCEDURES:  Not available or performed    RADIOLOGY:  Not available or performed    OTHER TESTING:  Not available or performed    FOLLOW-UP:  All of the issues listed above in the Assessment and Plan were discussed with the patient. All questions were answered. The patient expressed a clear understanding of the above. [address] [place] [address] in 4 weeks for routine monitoring and to review all data and determine the treatment plan.     Rosaura Avendano MD, MPH  Advanced Hepatology  [facility] 13 of [address] [facility] [address] [address] Kamran Valverde, [address] [place] [address] 22.  [phone]  [address]

## 2020-11-05 NOTE — PATIENT INSTRUCTIONS
We will perform blood work today I will speak to my colleagues on how best to approach your liver mass

## 2020-11-05 NOTE — PROGRESS NOTES
Identified pt with two pt identifiers(name and ). Reviewed record in preparation for visit and have obtained necessary documentation. Chief Complaint   Patient presents with    New Patient     Hepatic Flexure Mass      Vitals:    20 1518   BP: 135/80   Pulse: 85   Resp: 18   Temp: 97.2 °F (36.2 °C)   TempSrc: Temporal   SpO2: 93%   Weight: 137 lb (62.1 kg)   Height: 5' 4\" (1.626 m)   PainSc:   0 - No pain       Health Maintenance Review: Patient reminded of \"due or due soon\" health maintenance. I have asked the patient to contact his/her primary care provider (PCP) for follow-up on his/her health maintenance. Coordination of Care Questionnaire:  :   1) Have you been to an emergency room, urgent care, or hospitalized since your last visit? If yes, where when, and reason for visit? no       2. Have seen or consulted any other health care provider since your last visit? If yes, where when, and reason for visit? NO      Patient is accompanied by [facility] I have received verbal consent from Tasha Cook to discuss any/all medical information while they are present in the room.

## 2020-11-06 LAB
AFP L3 MFR SERPL: NORMAL % (ref 0–9.9)
AFP SERPL-MCNC: 3.9 NG/ML (ref 0–8)
ALBUMIN SERPL-MCNC: 4.8 G/DL (ref 3.7–4.7)
ALBUMIN/GLOB SERPL: 2.3 {RATIO} (ref 1.2–2.2)
ALP SERPL-CCNC: 87 IU/L (ref 39–117)
ALT SERPL-CCNC: 19 IU/L (ref 0–32)
AST SERPL-CCNC: 19 IU/L (ref 0–40)
BASOPHILS # BLD AUTO: 0 X10E3/UL (ref 0–0.2)
BASOPHILS NFR BLD AUTO: 1 %
BILIRUB SERPL-MCNC: 0.2 MG/DL (ref 0–1.2)
BUN SERPL-MCNC: 21 MG/DL (ref 8–27)
BUN/CREAT SERPL: 29 (ref 12–28)
CALCIUM SERPL-MCNC: 9.9 MG/DL (ref 8.7–10.3)
CANCER AG125 SERPL-ACNC: 12.4 U/ML (ref 0–38.1)
CANCER AG19-9 SERPL-ACNC: <2 U/ML (ref 0–35)
CEA SERPL-MCNC: 3.2 NG/ML (ref 0–4.7)
CHLORIDE SERPL-SCNC: 103 MMOL/L (ref 96–106)
CO2 SERPL-SCNC: 24 MMOL/L (ref 20–29)
CREAT SERPL-MCNC: 0.72 MG/DL (ref 0.57–1)
EOSINOPHIL # BLD AUTO: 0.2 X10E3/UL (ref 0–0.4)
EOSINOPHIL NFR BLD AUTO: 3 %
ERYTHROCYTE [DISTWIDTH] IN BLOOD BY AUTOMATED COUNT: 12.5 % (ref 11.7–15.4)
GLOBULIN SER CALC-MCNC: 2.1 G/DL (ref 1.5–4.5)
GLUCOSE SERPL-MCNC: 119 MG/DL (ref 65–99)
HCT VFR BLD AUTO: 49.2 % (ref 34–46.6)
HGB BLD-MCNC: 16.3 G/DL (ref 11.1–15.9)
IMM GRANULOCYTES # BLD AUTO: 0 X10E3/UL (ref 0–0.1)
IMM GRANULOCYTES NFR BLD AUTO: 0 %
INR PPP: 1 (ref 0.9–1.2)
LYMPHOCYTES # BLD AUTO: 2.3 X10E3/UL (ref 0.7–3.1)
LYMPHOCYTES NFR BLD AUTO: 36 %
MCH RBC QN AUTO: 30 PG (ref 26.6–33)
MCHC RBC AUTO-ENTMCNC: 33.1 G/DL (ref 31.5–35.7)
MCV RBC AUTO: 91 FL (ref 79–97)
MONOCYTES # BLD AUTO: 0.6 X10E3/UL (ref 0.1–0.9)
MONOCYTES NFR BLD AUTO: 9 %
NEUTROPHILS # BLD AUTO: 3.3 X10E3/UL (ref 1.4–7)
NEUTROPHILS NFR BLD AUTO: 51 %
PLATELET # BLD AUTO: 293 X10E3/UL (ref 150–450)
POTASSIUM SERPL-SCNC: 4.6 MMOL/L (ref 3.5–5.2)
PROT SERPL-MCNC: 6.9 G/DL (ref 6–8.5)
PROTHROMBIN TIME: 10.3 SEC (ref 9.1–12)
RBC # BLD AUTO: 5.43 X10E6/UL (ref 3.77–5.28)
SODIUM SERPL-SCNC: 142 MMOL/L (ref 134–144)
WBC # BLD AUTO: 6.4 X10E3/UL (ref 3.4–10.8)

## 2020-11-09 ENCOUNTER — TELEPHONE (OUTPATIENT)
Dept: HEMATOLOGY | Age: 80
End: 2020-11-09

## 2020-11-09 DIAGNOSIS — R16.0 LIVER MASS: Primary | ICD-10-CM

## 2020-11-09 NOTE — TELEPHONE ENCOUNTER
VO received for ultrasound-guided biopsy of liver lesion from Dr. Cy Avery. Called patient's daughter, Anamika Blancas, and relayed message that tumor markers were negative, next step is biopsy. Advised her central scheduling will call to arrange. Provided her with phone number in case she doesn't hear from them. Told her results are given at follow up appointment, which is usually a week or so after the biopsy. Patient is scheduled on 12/7 and we will leave as-is for now.

## 2020-11-13 ENCOUNTER — VIRTUAL VISIT (OUTPATIENT)
Dept: NEUROLOGY | Age: 80
End: 2020-11-13
Payer: MEDICARE

## 2020-11-13 DIAGNOSIS — R41.3 MEMORY CHANGE: Primary | ICD-10-CM

## 2020-11-13 PROCEDURE — 99205 OFFICE O/P NEW HI 60 MIN: CPT | Performed by: PSYCHIATRY & NEUROLOGY

## 2020-11-13 NOTE — PROGRESS NOTES
Neurology Note    Patient ID:  Sharif Buck  [id number]  52 y.o.  1940      Date of Consultation:  November 13, 2020    Referring Physician: Kimo Christie MD     Reason for Consultation:  Dementia     This is a telemedicine visit that was performed with in the originating site at patient's home and the distance site at [facility] outpatient clinic at [facility].  This telemedicine visit utilized synchronous (real-time) audio-video technology. Verbal consent to participate in the video visit was obtained. This visit occurred during the corona (COVID -19) public health emergency. I discussed with the patient the nature of our telemedicine visit, that:  - I would evaluate the patient and recommend diagnostic and treatment based on my assessment  - Our sessions are not being recorded and that personal health information is protected  - Our team will provide follow-up care in person if and when the patient needs it. Consent:  The patient is aware that this patient-initiated Telehealth encounter is a billable service, with coverage as determined by the patient's insurance carrier. The patient is aware that they may receive a bill and has provided verbal consent to proceed:     Subjective:       History of Present Illness:   Sharif Buck is a 68 y.o. female with a history of hyperlipidemia and arthritis who presents to neurology clinic for evaluation of cognitive impairment. Patient reports that this has been going on for several years however has been more noticeable in the last few months. She reports that her short-term memory has been mostly affected where she frequently forgets conversations or tasks. She has no trouble with her long-term memory. She has also forgotten her appointments and needs to be reminded. Her  and her granddaughter help her with these appointments. Otherwise she has not forgotten taking her medications however she does use a pillbox.   Is still able to cook and clean and does not leave tasks unfinished. She has not had any issues with leaving the stove on. She spends her day with her  and they, walks together and she spends her time cleaning. Also likes to play solitauTrail me on her iPad. She has not lost interest in any of her old hobbies however due to the coronavirus, she has picked up new hobbies and is enjoying them. She reports that she sleeps well and eats a fairly well-balanced diet. She does report smoking however she quit 5 years ago. Additionally she reports approximately 1 month ago she started taking prevention and feels like it may be improving her symptoms. Her  is the one who maintains finances. Of note patient does report that she has numbness in her bilateral hands. She says that this is been going on for years and underwent carpal tunnel release on the right. She does not have significant pain however has dropped things due to the numbness. She reports that it is all 5 of her fingers and it goes down into the palms of her hand. She does not remember if she has had an EMG done prior to her surgery. 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 She does not know if it affected her brain and that is what is causing her issues with memory. She does remember that she was in a halo for several weeks and required extensive rehab.     Past Medical History:   Diagnosis Date    Arthritis     bilateral hands, left knee - pt got injections    Carpal tunnel syndrome     bilateral hands    Dental infection ~1998    progressed to back of neck, pt had sx that consisted of taking bone from her right hip to replace in her spinal coloum; pt in halo for 3 months        Past Surgical History:   Procedure Laterality Date    HX CARPAL TUNNEL RELEASE Right 11/01/2018    open    HX HYSTERECTOMY      HX TONSILLECTOMY      HX UROLOGICAL  ~    Back surgery:  pt reports she had dental inf. that went to back of neck and ortho surgeon took pieces of bone from right hip to replace in spinal column; pt reports she wore halo for 3 months:  Dr. Diego Carpenter (sx done at [facility])        Family History   Problem Relation Age of Onset    Cancer Daughter     Parkinson's Disease Mother         Social History     Tobacco Use    Smoking status: Former Smoker     Last attempt to quit:      Years since quittin.8    Smokeless tobacco: Never Used   Substance Use Topics    Alcohol use: Yes     Alcohol/week: 3.0 standard drinks     Types: 3 Glasses of wine per week        Allergies   Allergen Reactions    Penicillins Shortness of Breath and Rash        Prior to Admission medications    Medication Sig Start Date End Date Taking? Authorizing Provider   COQ10, LIPOSOMAL UBIQUINOL, PO Take  by mouth. Provider, Historical   atorvastatin (LIPITOR) 40 mg tablet Take 1 Tab by mouth daily for 90 days. Indications: hardening of the arteries due to plaque buildup 10/19/20 1/17/21  Marylen Beecham, MD   OTHER daily. Prevagen for memory    Provider, Historical   traMADol (ULTRAM) 50 mg tablet Take 1 Tab by mouth every six (6) hours as needed for Pain. Max Daily Amount: 200 mg. 18   Suzette Patton MD   ibuprofen (ADVIL) 200 mg tablet Take 200 mg by mouth as needed for Pain. Provider, Historical       Review of Systems:    General, constitutional: negative  Eyes, vision: negative  Ears, nose, throat: negative  Cardiovascular, heart: negative  Respiratory: negative  Gastrointestinal: negative  Genitourinary: negative  Musculoskeletal: negative  Skin and integumentary: negative  Psychiatric: negative  Endocrine: negative  Neurological: negative, except for HPI  Hematologic/lymphatic: negative  Allergy/immunology: negative    Objective:     No vital signs were obtained via telemedicine today.     There are limitations to the neurological examination due to the technological features of telemedicine    Physical Exam:  General:  appears well nourished in no acute distress  Respiratory:  good respiratory effort. No labored breathing  Skin: intact. No obvious erythematous rashes    Neurological exam:    Awake, alert, oriented to person, place, and then knew the season, not the month or year or date  Registration was intact however she was only able to recall 2 out of 3 words at 5 minutes. Attention and concentration were intact  Language was intact. There was no aphasia  Speech: no dysarthria  Fund of knowledge was preserved    Cranial nerves:   Visual fields were full  Eomi, no evidence of nystagmus  Facial motor: normal and symmetric  Hearing intact    Tongue: midline without fasciculations    Motor:     Strength testing:  Appears to be full strength. Able to stand on her toes and heels. Sensory:  Sensation is intact except for in the bilateral hands with the palmar surface. She reports that this is no    Reflexes:  Unable to obtain via telemedicine    Cerebellar testing:  no tremor apparent, finger/nose and sujit were intact    Romberg: absent    Gait: steady. Labs:     Lab Results   Component Value Date/Time    Hemoglobin A1c 5.8 (H) 09/30/2020 03:11 PM    Sodium 142 11/05/2020 04:22 PM    Potassium 4.6 11/05/2020 04:22 PM    Chloride 103 11/05/2020 04:22 PM    Glucose 119 (H) 11/05/2020 04:22 PM    BUN 21 11/05/2020 04:22 PM    Creatinine 0.72 11/05/2020 04:22 PM    Calcium 9.9 11/05/2020 04:22 PM    WBC 6.4 11/05/2020 04:22 PM    HCT 49.2 (H) 11/05/2020 04:22 PM    HGB 16.3 (H) 11/05/2020 04:22 PM    PLATELET 162 07/23/2921 04:22 PM       Imaging:    No results found for this or any previous visit. Results from [place] encounter on 10/23/20   CT ABD W WO CONT    Narrative EXAM: CT ABD W WO CONT    INDICATION: Evaluate liver abnormality. COMPARISON: CT thorax 10/16/2020.     CONTRAST: 100 mL of Isovue-370. TECHNIQUE:    Multislice helical CT was performed from the diaphragm to the iliac crest prior  to and during rapid bolus intravenous contrast administration. Precontrast,  hepatic arterial, portal venous, and equilibrium phase imaging obtained. Oral  contrast was not administered. Contiguous 5 mm axial images were reconstructed  and lung and soft tissue windows were generated. Coronal and sagittal  reformations were generated. CT dose reduction was achieved through use of a  standardized protocol tailored for this examination and automatic exposure  control for dose modulation. FINDINGS:    LOWER THORAX: No significant abnormality in the incidentally imaged lower chest.    LIVER: Low-density lobulated mass with associated central coarse calcification  and peripheral transient arterial phase enhancement is seen in the right lobe  liver with craniocaudal extent up to 5.6 cm and maximal transaxial dimensions of  3.9 x 4.3 cm. There is some associated overlying capsular retraction. A small  focus of arterial phase enhancement with peripheral blush and persistent  enhancement on delayed images is seen in subcapsular segment 4A measuring  approximately 11 mm. No other focal hepatic lesions are seen. Hepatic vascular  structures opacify normally otherwise. BILIARY TREE: Gallbladder is within normal limits. CBD is not dilated. SPLEEN: within normal limits. PANCREAS: No mass or ductal dilatation. ADRENALS: Unremarkable. KIDNEYS: No mass, calculus, or hydronephrosis. STOMACH: Unremarkable. VISUALIZED BOWEL: No dilatation or wall thickening. PERITONEUM: No ascites or pneumoperitoneum. RETROPERITONEUM: No lymphadenopathy or aortic aneurysm. BONES: No destructive bone lesion. ABDOMINAL WALL: No mass or hernia.   ADDITIONAL COMMENTS: N/A      Impression IMPRESSION: Indeterminate coarse calcification containing hypodense lesion in  right lobe liver has features which could be compatible with epithelioid  hemangioendothelioma or other neoplasm such as metastatic mucinous carcinoma. A  second lesion in the anterior segment 2 could reflect flash fill hemangioma or  very small lesion of same etiology. 23X     Assessment and plan   Shanta Cuello is a 68 y.o. female with a history of hyperlipidemia and arthritis who presents to neurology clinic for evaluation of cognitive impairment, her neurological exam does reveal that she has delayed recall otherwise seems unremarkable. She additionally does have some numbness in her bilateral hands of unclear etiology. - Will obtain MRI brain to  exclude vascular component/small vessel ischemia as a cause of memory impairment   - Neuropsych testing  - Discussed treatment options in detail including risks/benefits and expectations. While medication is not likely to made a \"night and day\" difference, it may aid modestly in improving concentration and attention. Medication titration and addition of adjunctive agents may be necessary to achieve maximum benefit. Explained that AD is a progressive disease process. - Discussed that she would benefit from designation of a POA to assist with executive decision making. Finances and medication administration should be monitored to ensure accuracy and prevent errors. - Patient currently has appropriate social/caregiver support in place. Advanced care planning has been reviewed with the patient and family.    - Extensively discussed the importance of diet and exercise and how this has been shown to delay the progression of Alzheimer's and other forms of neurodegenerative diseases. We did discuss a Mediterranean diet.   - Encouraged to limit screen time and spend more time reading or doing puzzles such as crosswords or soduku     Patient Active Problem List   Diagnosis Code    Liver mass R16.0    Cognitive impairment R41.89                   Signed By:  Michelle Oleary MD     November 13, 2020

## 2020-11-13 NOTE — PATIENT INSTRUCTIONS
Learning About Dementia What is dementia? We all forget things as we get older. Many older people have a slight loss of memory that does not affect their daily lives. But memory loss that gets worse may mean that you have dementia. Dementia is a loss of mental skills that affects your daily life. It can cause problems with memory, problem-solving, and learning. It also can cause problems with thinking and planning. Dementia usually gets worse over time. But how quickly it gets worse is different for each person. Some people stay the same for years. Others lose skills quickly. Your chances of having dementia rise as you get older. But this doesn't mean that everyone will get it. How is dementia diagnosed? To diagnose dementia, your doctor will: · Do a physical exam. 
· Ask questions about recent and past illnesses and life events. The doctor will want to talk to a close family member to check details. · Ask you to do some simple things that test your memory and other mental skills. Your doctor may ask you to tell what day and year it is, repeat a series of words, or draw a clock face. The doctor may do tests to look for a cause that can be treated. For example, you might have blood tests to check your thyroid or to look for an infection. You might also have a test that shows a picture of your brain, like an MRI or a CT scan. These tests can help your doctor find a tumor or brain injury. Knowing the type of dementia a person has can help the doctor prescribe medicines or other treatments. What are the symptoms? Usually the first symptom of dementia is memory loss. Often the person who has the memory problem doesn't notice it, but family and friends do. People who have dementia may have increasing trouble with: 
· Recalling recent events. They may forget appointments or lose objects. · Recognizing people and places. · Keeping up with conversations and activity. · Finding their way around familiar places, or driving to and from places they know well. · Keeping up personal care such as grooming or bathing. · Planning and carrying out routine tasks. They may have trouble following a recipe or writing a letter or email. How is dementia treated? Medicines for dementia can slow it down for a while and make it easier to live with. Medicines can't cure it. But they may help improve mental function, mood, or behavior. If a stroke caused the dementia, doing things to reduce the chance of another stroke may help. They include eating healthy foods, being active, staying at a healthy weight, and not smoking. As dementia gets worse, a person may get depressed or angry and upset. An active social life, counseling, and sometimes medicine may help with changing emotions. The goals of ongoing treatment are to keep the person safely at home as long as possible and to provide support and guidance to the caregivers. The person will need routine follow-up visits. The doctor will monitor medicines and the person's level of functioning. Follow-up care is a key part of your treatment and safety. Be sure to make and go to all appointments, and call your doctor if you are having problems. It's also a good idea to know your test results and keep a list of the medicines you take. Where can you learn more? Go to http://www.gray.com/ Enter 035 [id number] [id number] 21 in the search box to learn more about \"Learning About Dementia. \" Current as of: January 31, 2020               Content Version: 12.6 © 8016-3777 [employer]. Care instructions adapted under license by [employer] (which disclaims liability or warranty for this information). If you have questions about a medical condition or this instruction, always ask your healthcare professional. [place] [address] any warranty or liability for your use of this information. A Healthy Lifestyle: Care Instructions Your Care Instructions A healthy lifestyle can help you feel good, stay at a healthy weight, and have plenty of energy for both work and play. A healthy lifestyle is something you can share with your whole family. A healthy lifestyle also can lower your risk for serious health problems, such as high blood pressure, heart disease, and diabetes. You can follow a few steps listed below to improve your health and the health of your family. Follow-up care is a key part of your treatment and safety. Be sure to make and go to all appointments, and call your doctor if you are having problems. It's also a good idea to know your test results and keep a list of the medicines you take. How can you care for yourself at home? · Do not eat too much sugar, fat, or fast foods. You can still have dessert and treats now and then. The goal is moderation. · Start small to improve your eating habits. Pay attention to portion sizes, drink less juice and soda pop, and eat more fruits and vegetables. ? Eat a healthy amount of food. A 3-ounce serving of meat, for example, is about the size of a deck of cards. Fill the rest of your plate with vegetables and whole grains. ? Limit the amount of soda and sports drinks you have every day. Drink more water when you are thirsty. ? Eat at least 5 servings of fruits and vegetables every day. It may seem like a lot, but it is not hard to reach this goal. A serving or helping is 1 piece of fruit, 1 cup of vegetables, or 2 cups of leafy, raw vegetables. Have an apple or some carrot sticks as an afternoon snack instead of a candy bar.  Try to have fruits and/or vegetables at every meal. 
 · Make exercise part of your daily routine. You may want to start with simple activities, such as walking, bicycling, or slow swimming. Try to be active 30 to 60 minutes every day. You do not need to do all 30 to 60 minutes all at once. For example, you can exercise 3 times a day for 10 or 20 minutes. Moderate exercise is safe for most people, but it is always a good idea to talk to your doctor before starting an exercise program. 
· Keep moving. Leanne Memos the lawn, work in the garden, or [employer]. Take the stairs instead of the elevator at work. · If you smoke, quit. People who smoke have an increased risk for heart attack, stroke, cancer, and other lung illnesses. Quitting is hard, but there are ways to boost your chance of quitting tobacco for good. ? Use nicotine gum, patches, or lozenges. ? Ask your doctor about stop-smoking programs and medicines. ? Keep trying. In addition to reducing your risk of diseases in the future, you will notice some benefits soon after you stop using tobacco. If you have shortness of breath or asthma symptoms, they will likely get better within a few weeks after you quit. · Limit how much alcohol you drink. Moderate amounts of alcohol (up to 2 drinks a day for men, 1 drink a day for women) are okay. But drinking too much can lead to liver problems, high blood pressure, and other health problems. Family health If you have a family, there are many things you can do together to improve your health. · Eat meals together as a family as often as possible. · Eat healthy foods. This includes fruits, vegetables, lean meats and dairy, and whole grains. · Include your family in your fitness plan. Most people think of activities such as jogging or tennis as the way to fitness, but there are many ways you and your family can be more active. Anything that makes you breathe hard and gets your heart pumping is exercise. Here are some tips: ? Walk to do errands or to take your child to school or the bus. 
? Go for a family bike ride after dinner instead of watching TV. Where can you learn more? Go to http://www.gray.com/ Enter F127 in the search box to learn more about \"A Healthy Lifestyle: Care Instructions. \" Current as of: January 31, 2020               Content Version: 12.6 © 2006-2020 [employer] [place]. Care instructions adapted under license by [employer] (which disclaims liability or warranty for this information). If you have questions about a medical condition or this instruction, always ask your healthcare professional. [address] any warranty or liability for your use of this information.

## 2020-11-17 NOTE — ROUTINE PROCESS
Have you been tested for COVID-19 in the past 7 days? Do you have a personal history of COVID-19 within the past 28 days? NO  If Yes, What was the method of testing: clinical assumption or test result? Have you had close contact with a known to be positive COVID-19 patient within the past 14 days? NO    Are you a healthcare worker or ? If Yes, have you been exposed to COVID-19 without proper PPE? NO    Do you live in a SNF, adult home or other institutional setting? NO  If Yes, have they experienced a flood of COVID-19 positive patients?     In the past 2-14 days have you had any of the following symptoms NONE   Cough   New onset Shortness of breath or difficulty breathing    Or at least two of these symptoms:   Fever greater than 100 F   Chills   Repeated shaking with chills   Muscle pain   Headache   Sore throat   New loss of taste or smell   New onset diarrhea

## 2020-11-18 ENCOUNTER — HOSPITAL ENCOUNTER (OUTPATIENT)
Dept: ULTRASOUND IMAGING | Age: 80
Discharge: HOME | End: 2020-11-18
Attending: HOSPITALIST
Payer: MEDICARE

## 2020-11-18 VITALS
BODY MASS INDEX: 23.05 KG/M2 | HEART RATE: 70 BPM | WEIGHT: 135 LBS | HEIGHT: 64 IN | TEMPERATURE: 98.6 F | DIASTOLIC BLOOD PRESSURE: 67 MMHG | OXYGEN SATURATION: 99 % | SYSTOLIC BLOOD PRESSURE: 106 MMHG | RESPIRATION RATE: 20 BRPM

## 2020-11-18 DIAGNOSIS — R16.0 LIVER MASS: ICD-10-CM

## 2020-11-18 PROCEDURE — 2709999900 HC NON-CHARGEABLE SUPPLY

## 2020-11-18 PROCEDURE — 88307 TISSUE EXAM BY PATHOLOGIST: CPT

## 2020-11-18 PROCEDURE — 47000 NEEDLE BIOPSY OF LIVER PERQ: CPT

## 2020-11-18 PROCEDURE — 74011250636 HC RX REV CODE- 250/636: Performed by: HOSPITALIST

## 2020-11-18 PROCEDURE — 77030040395 HC NDL BIOP COAX INTRO MRTM -B

## 2020-11-18 RX ORDER — MIDAZOLAM HYDROCHLORIDE 1 MG/ML
1-5 INJECTION, SOLUTION INTRAMUSCULAR; INTRAVENOUS
Status: DISCONTINUED | OUTPATIENT
Start: 2020-11-18 | End: 2020-11-18

## 2020-11-18 RX ORDER — SODIUM CHLORIDE 9 MG/ML
25 INJECTION, SOLUTION INTRAVENOUS CONTINUOUS
Status: DISCONTINUED | OUTPATIENT
Start: 2020-11-18 | End: 2020-11-18

## 2020-11-18 RX ORDER — FENTANYL CITRATE 50 UG/ML
100 INJECTION, SOLUTION INTRAMUSCULAR; INTRAVENOUS
Status: DISCONTINUED | OUTPATIENT
Start: 2020-11-18 | End: 2020-11-18

## 2020-11-18 RX ADMIN — MIDAZOLAM HYDROCHLORIDE 1 MG: 1 INJECTION, SOLUTION INTRAMUSCULAR; INTRAVENOUS at 09:39

## 2020-11-18 RX ADMIN — MIDAZOLAM HYDROCHLORIDE 1 MG: 1 INJECTION, SOLUTION INTRAMUSCULAR; INTRAVENOUS at 10:00

## 2020-11-18 RX ADMIN — FENTANYL CITRATE 25 MCG: 50 INJECTION, SOLUTION INTRAMUSCULAR; INTRAVENOUS at 09:44

## 2020-11-18 RX ADMIN — FENTANYL CITRATE 25 MCG: 50 INJECTION, SOLUTION INTRAMUSCULAR; INTRAVENOUS at 09:58

## 2020-11-18 RX ADMIN — FENTANYL CITRATE 25 MCG: 50 INJECTION, SOLUTION INTRAMUSCULAR; INTRAVENOUS at 09:39

## 2020-11-18 RX ADMIN — SODIUM CHLORIDE 25 ML/HR: 900 INJECTION, SOLUTION INTRAVENOUS at 09:38

## 2020-11-18 NOTE — DISCHARGE INSTRUCTIONS
[address]  Radiology Department  [phone]      Radiologist:  Dr. Ashok Rodriguez    Date:11/18/20220      Liver Biopsy Discharge Instructions      You may have an aching pain in the biopsy site tonight. Take Tylenol if allowed, as directed on the label, for pain or discomfort. Avoid ibuprofen (Advil, Motrin) and aspirin for the next 48 hours as these drugs may increase your risk of bleeding. Resume your previous diet and resume your prescribed medications. You have been given sedating medications today. Go home and rest.  Do not drive or make any important decisions. Avoid strenuous activity,  do not lift anything heavier than a small grocery bag (10 pounds) and avoid excessive twisting and reaching for the next 5 days. If you experience severe sweating, severe abdominal pain, dizziness or faintness, go to the nearest Emergency Room immediately. Pain under the left collar bone is normal.    Watch for signs of infection at biopsy site:  redness, pain, drainage, fever chills. If this occurs, call you doctor. Follow up with your ordering physician as previously discussed to receive results. If you have any questions or concerns, please call [phone] and ask to speak to a radiology nurse.

## 2020-11-18 NOTE — H&P
Radiology History and Physical    Patient: Ingrid Chow 68 y.o. female       Chief Complaint: Liver mass    History of Present Illness: 68year old woman with a right hepatic mass found on CT. Presents today for biopsy. No shortness of breath, chest pain, abdominal pain. History:    Past Medical History:   Diagnosis Date    Arthritis     bilateral hands, left knee - pt got injections    Carpal tunnel syndrome     bilateral hands    Dental infection ~    progressed to back of neck, pt had sx that consisted of taking bone from her right hip to replace in her spinal coloum; pt in halo for 3 months     Family History   Problem Relation Age of Onset    Cancer Daughter     Parkinson's Disease Mother      Social History     Socioeconomic History    Marital status:      Spouse name: Not on file    Number of children: Not on file    Years of education: Not on file    Highest education level: Not on file   Occupational History    Not on file   Social Needs    Financial resource strain: Not on file    Food insecurity     Worry: Not on file     Inability: Not on file    Transportation needs     Medical: Not on file     Non-medical: Not on file   Tobacco Use    Smoking status: Former Smoker     Last attempt to quit:      Years since quittin.8    Smokeless tobacco: Never Used   Substance and Sexual Activity    Alcohol use:  Yes     Alcohol/week: 3.0 standard drinks     Types: 3 Glasses of wine per week    Drug use: No    Sexual activity: Not on file   Lifestyle    Physical activity     Days per week: Not on file     Minutes per session: Not on file    Stress: Not on file   Relationships    Social connections     Talks on phone: Not on file     Gets together: Not on file     Attends Mosque service: Not on file     Active member of club or organization: Not on file     Attends meetings of clubs or organizations: Not on file     Relationship status: Not on file    Intimate partner violence     Fear of current or ex partner: Not on file     Emotionally abused: Not on file     Physically abused: Not on file     Forced sexual activity: Not on file   Other Topics Concern    Not on file   Social History Narrative    Not on file       Allergies: Allergies   Allergen Reactions    Penicillins Shortness of Breath and Rash       Current Medications:  Current Outpatient Medications   Medication Sig    COQ10, LIPOSOMAL UBIQUINOL, PO Take  by mouth.  atorvastatin (LIPITOR) 40 mg tablet Take 1 Tab by mouth daily for 90 days. Indications: hardening of the arteries due to plaque buildup    OTHER daily. Prevagen for memory    traMADol (ULTRAM) 50 mg tablet Take 1 Tab by mouth every six (6) hours as needed for Pain. Max Daily Amount: 200 mg.  ibuprofen (ADVIL) 200 mg tablet Take 200 mg by mouth as needed for Pain. Current Facility-Administered Medications   Medication Dose Route Frequency    0.9% sodium chloride infusion  25 mL/hr IntraVENous CONTINUOUS    fentaNYL citrate (PF) injection 100 mcg  100 mcg IntraVENous Multiple    midazolam (PF) (VERSED) injection 1-5 mg  1-5 mg IntraVENous Multiple        Physical Exam:  Blood pressure 131/69, pulse 71, temperature 98.6 °F (37 °C), resp. rate 12, height 5' 4\" (1.626 m), weight 61.2 kg (135 lb), SpO2 100 %, not currently breastfeeding. GENERAL: alert, cooperative, no distress, appears stated age,   LUNG: Nonlabored respiration on room air  HEART: regular rate and rhythm, R radial & R DP pulse 2/2  EXT: No edema BLEs  ABD: Nontender, nondistended    Alerts:    Hospital Problems  Date Reviewed: 11/17/2020    None          Laboratory:    No results for input(s): HGB, HCT, WBC, PLT, INR, BUN, CREA, K, CRCLT, HGBEXT, HCTEXT, PLTEXT, INREXT in the last 72 hours. No lab exists for component: PTT, PT      Plan of Care/Planned Procedure:  Risks, benefits, and alternatives reviewed with patient and she agrees to proceed with the procedure. Ultrasound guided biopsy of right hepatic mass    Deemed appropriate for moderate sedation with versed and fentanyl.     Caitlin Orozco MD  Interventional Radiology  [facility]  9:27 AM, 11/18/2020

## 2020-11-18 NOTE — PROCEDURES
Interventional Radiology  Procedure Note        11/18/2020 10:02 AM    Patient: Jacey Bray [facility] [place][note 2]     Informed consent obtained    Diagnosis: Right hepatic mass    Procedure(s): ultrasound guided biopsy of right hepatic mass    Specimens removed:  5x 18ga core samples provided directly to pathology dept    Complications: None    Primary Physician: Michele Anthony MD    Recomendations: N/A    Discharge Disposition: stable; discharge to home    Full dictated report to follow    Michele Anthony MD  Interventional Radiology  [facility]  10:02 AM, 11/18/2020

## 2020-11-18 NOTE — ROUTINE PROCESS
Procedure reviewed with patient by Dr. Michele Anthony. Opportunity to verbalize questions and concerns. Consent obtained.

## 2020-11-20 NOTE — PROGRESS NOTES
Subjective/HPI:     Deanna Alexander is a 68 y.o. female is here to establish care. She has a PMHx of arthritis, HLD, former smoker. She is here for evaluation of coronary artery disease. She had low dose CT scan done for lung cancer screening. CT scan showed severe coronary artery calcifications, along with some nodules of the liver and lungs. Recommended f/u CT in 6 months. She has been referred to hepatologist.  She reports for the past year, perhaps less she has been having midsternal CP, MCNEILL, and mid upper back pains with exertion. She has thought it was over doing it and just slowed her pace which helped. Most recent episode yesterday with walking had onset of pain upper mid back, had to slow down and then eventually rest to have it go away. Started statin 2 weeks ago. PCP Provider  Jackson Maloney MD    Past Medical History:   Diagnosis Date    Arthritis     bilateral hands, left knee - pt got injections    Carpal tunnel syndrome     bilateral hands    Dental infection ~1998    progressed to back of neck, pt had sx that consisted of taking bone from her right hip to replace in her spinal coloum; pt in halo for 3 months        Past Surgical History:   Procedure Laterality Date    HX CARPAL TUNNEL RELEASE Right 11/01/2018    open    HX HYSTERECTOMY      HX TONSILLECTOMY      HX UROLOGICAL  ~1998    Back surgery:  pt reports she had dental inf. that went to back of neck and ortho surgeon took pieces of bone from right hip to replace in spinal column; pt reports she wore halo for 3 months:  Dr. Suman Canseco (sx done at [facility])        family history includes Cancer in her daughter; Coronary Artery Disease in her brother and mother; High Cholesterol in her brother; Parkinson's Disease in her mother.      Social History     Socioeconomic History    Marital status:      Spouse name: Not on file    Number of children: Not on file    Years of education: Not on file    Highest education level: Not on file   Occupational History    Not on file   Social Needs    Financial resource strain: Not on file    Food insecurity     Worry: Not on file     Inability: Not on file    Transportation needs     Medical: Not on file     Non-medical: Not on file   Tobacco Use    Smoking status: Former Smoker     Packs/day: 1.00     Years: 20.00     Pack years: 20.00     Last attempt to quit:      Years since quittin.9    Smokeless tobacco: Never Used   Substance and Sexual Activity    Alcohol use: Yes     Alcohol/week: 3.0 standard drinks     Types: 3 Glasses of wine per week     Comment: weekly    Drug use: No    Sexual activity: Not on file   Lifestyle    Physical activity     Days per week: Not on file     Minutes per session: Not on file    Stress: Not on file   Relationships    Social connections     Talks on phone: Not on file     Gets together: Not on file     Attends Temple service: Not on file     Active member of club or organization: Not on file     Attends meetings of clubs or organizations: Not on file     Relationship status: Not on file    Intimate partner violence     Fear of current or ex partner: Not on file     Emotionally abused: Not on file     Physically abused: Not on file     Forced sexual activity: Not on file   Other Topics Concern    Not on file   Social History Narrative    Not on file       Allergies   Allergen Reactions    Penicillins Shortness of Breath and Rash        Outpatient Encounter Medications as of 2020   Medication Sig Dispense Refill    cholecalciferol (Vitamin D3) 25 mcg (1,000 unit) cap Take  by mouth daily.  OTHER daily. Prevagen for memory      traMADol (ULTRAM) 50 mg tablet Take 1 Tab by mouth every six (6) hours as needed for Pain. Max Daily Amount: 200 mg. 25 Tab 0    ibuprofen (ADVIL) 200 mg tablet Take 200 mg by mouth as needed for Pain.  COQ10, LIPOSOMAL UBIQUINOL, PO Take  by mouth.       atorvastatin (LIPITOR) 40 mg tablet Take 1 Tab by mouth daily for 90 days. Indications: hardening of the arteries due to plaque buildup 90 Tab 0     No facility-administered encounter medications on file as of 11/24/2020. Review of Symptoms:    Review of Systems   Constitutional: Negative for chills, fever and weight loss. HENT: Negative for nosebleeds. Eyes: Negative for blurred vision and double vision. Respiratory: Positive for shortness of breath. Negative for cough and wheezing. Cardiovascular: Positive for chest pain. Negative for palpitations, orthopnea, leg swelling and PND. Gastrointestinal: Negative for abdominal pain, blood in stool, diarrhea, nausea and vomiting. Musculoskeletal: Negative for joint pain. Skin: Negative for rash. Neurological: Negative for dizziness, tingling and loss of consciousness. Endo/Heme/Allergies: Does not bruise/bleed easily. Physical Exam:      General: Well developed, in no acute distress, cooperative and alert  HEENT: No carotid bruits, no JVD, trach is midline. Neck Supple, PEERL, EOM intact. Heart:  reg rate and rhythm; normal S1/S2; no murmurs, no gallops or rubs. Respiratory: Clear bilaterally x 4, no wheezing or rales  Abdomen:   Soft, non-tender, no distention, no masses. + BS. Extremities:  Normal cap refill, no cyanosis, atraumatic. No edema. Neuro: A&Ox3, speech clear, gait stable. Skin: Skin color is normal. No rashes or lesions.  Non diaphoretic  Vascular: 2+ pulses symmetric in all extremities    Vitals:    11/24/20 0943 11/24/20 0954   BP: (!) 150/98 (!) 160/94   Pulse: 76    Resp: 18    SpO2: 97%    Weight: 136 lb 12.8 oz (62.1 kg)    Height: 5' 4\" (1.626 m)        ECG: SR    Cardiology Labs:    Lab Results   Component Value Date/Time    Cholesterol, total 231 (H) 09/30/2020 03:11 PM    HDL Cholesterol 93 09/30/2020 03:11 PM    VLDL 32 09/30/2020 03:11 PM    CHOL/HDL Ratio 2 09/30/2020 03:11 PM    LDL, calculated 106 09/30/2020 03:11 PM       Lab Results   Component Value Date/Time    Hemoglobin A1c 5.8 (H) 09/30/2020 03:11 PM       Lab Results   Component Value Date/Time    Sodium 142 11/05/2020 04:22 PM    Potassium 4.6 11/05/2020 04:22 PM    Chloride 103 11/05/2020 04:22 PM    CO2 24 11/05/2020 04:22 PM    Glucose 119 (H) 11/05/2020 04:22 PM    BUN 21 11/05/2020 04:22 PM    Creatinine 0.72 11/05/2020 04:22 PM    BUN/Creatinine ratio 29 (H) 11/05/2020 04:22 PM    GFR est AA 93 11/05/2020 04:22 PM    GFR est non-AA 80 11/05/2020 04:22 PM    Calcium 9.9 11/05/2020 04:22 PM    Anion gap 10 09/30/2020 03:11 PM    Bilirubin, total 0.2 11/05/2020 04:22 PM    ALT (SGPT) 19 11/05/2020 04:22 PM    Alk. phosphatase 87 11/05/2020 04:22 PM    Protein, total 6.9 11/05/2020 04:22 PM    Albumin 4.8 (H) 11/05/2020 04:22 PM    Globulin 2.80 09/30/2020 03:11 PM    A-G Ratio 2.3 (H) 11/05/2020 04:22 PM          Assessment:     Assessment:       ICD-10-CM ICD-9-CM    1. Coronary artery calcification seen on CT scan  I25.10 414.00 AMB POC EKG ROUTINE W/ 12 LEADS, INTER & REP   2. Mixed hyperlipidemia  E78.2 272.2         Plan:     1. Coronary artery calcification seen on CT scan  Low dose CT scan done for lung cancer screening in 10/2020 with severe coronary artery calcification,  along with pulmonary nodules and nodules noted of the liver  Having exertional CP, mid upper back pain and MCNEILL concerning for angina  Evaluated with lexiscan stress test; has gait impairment  Check echocardiogram  ASA started; continue statin therapy  Discussed to rest if symptoms start, if not resolved should seek emergent care    2. Mixed hyperlipidemia   in 9/2020  Start statin therapy and low fat, low cholesterol diet  Lipids managed by PCP    F/u with Dr. Narayan Tay after testing complete      [facility]    11/24/2020         Patient seen, examined by me personally. Plan discussed as detailed. Agree with note as outlined by  NP.  I confirm findings in history and physical exam. No additional findings noted. Agree with plan as outlined above.      Ladarius Bear MD

## 2020-11-24 ENCOUNTER — OFFICE VISIT (OUTPATIENT)
Dept: CARDIOLOGY CLINIC | Age: 80
End: 2020-11-24
Payer: MEDICARE

## 2020-11-24 VITALS
DIASTOLIC BLOOD PRESSURE: 94 MMHG | HEIGHT: 64 IN | WEIGHT: 136.8 LBS | HEART RATE: 76 BPM | RESPIRATION RATE: 18 BRPM | SYSTOLIC BLOOD PRESSURE: 160 MMHG | OXYGEN SATURATION: 97 % | BODY MASS INDEX: 23.35 KG/M2

## 2020-11-24 DIAGNOSIS — I25.10 CORONARY ARTERY CALCIFICATION SEEN ON CT SCAN: Primary | ICD-10-CM

## 2020-11-24 DIAGNOSIS — E78.2 MIXED HYPERLIPIDEMIA: ICD-10-CM

## 2020-11-24 PROCEDURE — G8427 DOCREV CUR MEDS BY ELIG CLIN: HCPCS | Performed by: INTERNAL MEDICINE

## 2020-11-24 PROCEDURE — 93010 ELECTROCARDIOGRAM REPORT: CPT | Performed by: INTERNAL MEDICINE

## 2020-11-24 PROCEDURE — 99204 OFFICE O/P NEW MOD 45 MIN: CPT | Performed by: INTERNAL MEDICINE

## 2020-11-24 PROCEDURE — G0463 HOSPITAL OUTPT CLINIC VISIT: HCPCS | Performed by: INTERNAL MEDICINE

## 2020-11-24 PROCEDURE — G8536 NO DOC ELDER MAL SCRN: HCPCS | Performed by: INTERNAL MEDICINE

## 2020-11-24 PROCEDURE — G8420 CALC BMI NORM PARAMETERS: HCPCS | Performed by: INTERNAL MEDICINE

## 2020-11-24 PROCEDURE — 1100F PTFALLS ASSESS-DOCD GE2>/YR: CPT | Performed by: INTERNAL MEDICINE

## 2020-11-24 PROCEDURE — 1090F PRES/ABSN URINE INCON ASSESS: CPT | Performed by: INTERNAL MEDICINE

## 2020-11-24 PROCEDURE — 93005 ELECTROCARDIOGRAM TRACING: CPT | Performed by: INTERNAL MEDICINE

## 2020-11-24 PROCEDURE — G8510 SCR DEP NEG, NO PLAN REQD: HCPCS | Performed by: INTERNAL MEDICINE

## 2020-11-24 PROCEDURE — 3288F FALL RISK ASSESSMENT DOCD: CPT | Performed by: INTERNAL MEDICINE

## 2020-11-24 PROCEDURE — G8399 PT W/DXA RESULTS DOCUMENT: HCPCS | Performed by: INTERNAL MEDICINE

## 2020-11-24 RX ORDER — GLUCOSAMINE SULFATE 1500 MG
POWDER IN PACKET (EA) ORAL DAILY
COMMUNITY
End: 2021-07-29

## 2020-11-24 RX ORDER — ASPIRIN 81 MG/1
81 TABLET ORAL DAILY
Qty: 30 TAB | Refills: 11
Start: 2020-11-24

## 2020-11-24 NOTE — PROGRESS NOTES
Chief Complaint   Patient presents with    Abnormal CT Scan     NP, ref by Dr. Jonny Webber, abn CT calcium scoring. Denied cardiac symptoms. Chief Complaint   Patient presents with    Abnormal CT Scan     NP, ref by Dr. Jonny Webber, abn CT calcium scoring. Denied cardiac symptoms.

## 2020-11-24 NOTE — LETTER
11/24/20 Patient: Digna Cedillo YOB: 1940 Date of Visit: 11/24/2020 Conner Banks MD 
[address] [address] VIA In Basket Dear Conner Banks MD, Thank you for referring Ms. Paul Benton to [address] [place] [address] [facility] for evaluation. My notes for this consultation are attached. If you have questions, please do not hesitate to call me. I look forward to following your patient along with you. Sincerely, Heidy Mirza MD

## 2020-11-25 ENCOUNTER — OFFICE VISIT (OUTPATIENT)
Dept: HEMATOLOGY | Age: 80
End: 2020-11-25
Payer: MEDICARE

## 2020-11-25 VITALS
DIASTOLIC BLOOD PRESSURE: 76 MMHG | TEMPERATURE: 96.1 F | RESPIRATION RATE: 16 BRPM | HEART RATE: 77 BPM | SYSTOLIC BLOOD PRESSURE: 125 MMHG | BODY MASS INDEX: 23.46 KG/M2 | WEIGHT: 137.4 LBS | OXYGEN SATURATION: 98 % | HEIGHT: 64 IN

## 2020-11-25 DIAGNOSIS — Z11.59 ENCOUNTER FOR SCREENING FOR OTHER VIRAL DISEASES: ICD-10-CM

## 2020-11-25 DIAGNOSIS — R16.0 LIVER MASS: Primary | ICD-10-CM

## 2020-11-25 DIAGNOSIS — E78.2 MIXED HYPERLIPIDEMIA: ICD-10-CM

## 2020-11-25 DIAGNOSIS — I25.10 CORONARY ARTERY CALCIFICATION SEEN ON CT SCAN: ICD-10-CM

## 2020-11-25 PROCEDURE — G0463 HOSPITAL OUTPT CLINIC VISIT: HCPCS | Performed by: HOSPITALIST

## 2020-11-25 PROCEDURE — 99214 OFFICE O/P EST MOD 30 MIN: CPT | Performed by: HOSPITALIST

## 2020-11-25 NOTE — PATIENT INSTRUCTIONS

## 2020-11-25 NOTE — PROGRESS NOTES
Identified pt with two pt identifiers(name and ). Reviewed record in preparation for visit and have obtained necessary documentation. Chief Complaint   Patient presents with    Other     liver mass  4 wk F/U      Vitals:    20 1614   Resp: 16   Temp: (!) 96.1 °F (35.6 °C)   TempSrc: Temporal   Weight: 137 lb 6.4 oz (62.3 kg)   Height: 5' 4\" (1.626 m)   PainSc:   0 - No pain       Health Maintenance Review: Patient reminded of \"due or due soon\" health maintenance. I have asked the patient to contact his/her primary care provider (PCP) for follow-up on his/her health maintenance. Coordination of Care Questionnaire:  :   1) Have you been to an emergency room, urgent care, or hospitalized since your last visit? If yes, where when, and reason for visit? no       2. Have seen or consulted any other health care provider since your last visit? If yes, where when, and reason for visit? Cardiologist and neurologist for check up    Patient is accompanied by self I have received verbal consent from [address] [place] [address] to discuss any/all medical information while they are present in the room.

## 2020-11-25 NOTE — LETTER
11/25/20 Patient: Jackie Mendoza YOB: 1940 Date of Visit: 11/25/2020 Dustin Diaz MD 
[address] [address] VIA In Basket Dear Dustin Diaz MD, Thank you for referring Ms. Sreedhar Escobedo to [address] for evaluation. My notes for this consultation are attached. If you have questions, please do not hesitate to call me. I look forward to following your patient along with you. Sincerely, Radha Young MD

## 2020-11-25 NOTE — PROGRESS NOTES
[address] [facility]      Demetrius Plata MD, Jayden Griffin, Saúl Evans MD, MPH      Braden Vargas, PA-CHRISTEL Winters, [facility]-BC     Maryellen Nunez, [facility]   Della Osei [facility]    Carlos A Ruano, [facility]       [facility]    at [facility], [address] [place] [address], [facility] 22.    [phone]    FAX: [address] [place] [address] [address], [address]    [phone]    FAX: [phone]     Patient Care Team:  Wen Hinds MD as PCP - General (Hospitalist)  Wen Hinds MD as PCP - [facility] Provider  Prince Fletcher MD as Physician (Cardiology)    Problem List  Date Reviewed: 11/24/2020          Codes Class Noted    Liver mass ICD-10-CM: R16.0  ICD-9-CM: 573.8  11/5/2020        Cognitive impairment ICD-10-CM: R41.89  ICD-9-CM: 294.9  11/5/2020              Osvaldo Saenz is being seen at [facility] for management of a liver mass  The active problem list, all pertinent past medical history, medications, liver histology, radiologic findings and laboratory findings related to the liver disorder were reviewed with the patient. The patient is a 68 y.o.  female without any history of previous liver disease. The patient underwent low dose CT scan of the chest for lung cancer screening in 09/2020 which incidentally noted a coarse area of calcification in the right lobe of the liver. Subsequent CT scan of the liver performed in 10/2020 demonstrated Indeterminate coarse calcification containing hypodense lesion in the right lobe liver with features compatible with epithelioid  hemangioendothelioma or other neoplasm such as metastatic mucinous carcinoma.  A second lesion in anterior segment 2 could reflect flash fill hemangioma or very small lesion of same etiology. There was also a 1. 2 pulmonary nodules measuring 7 x 5 mm    Patient does not have a history of extrahepatic malignancy. She has not seen a primary care physician for over 10 years and has not had routine screening done including colonoscopy, PAP smear or mammogram.     Recent LFT's performed were normal. Cancer serologies was negative for AFP, , and CEA,     CT guided biopsy of liver mass performed in 11/2020 was negative for malignancy. 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 The patient has no symptoms which can be attributed to the liver disorder. The patient reports upper back pain which extends from the left upper back to the right. She states the pain started 2 days ago. The patient completes all daily activities without any functional limitations. Health maintenance  [facility]. surveillance: CT liver 10/2020: coarse calcification containing hypodense lesion in the right lobe of the liver  Hepatitis A serology sent  Hepatitis B serology sent  Influenza: Recommended this occur late into each fall  Pneumonia: Prevnar followed by Pneumovax after 8 weeks    ASSESSMENT AND PLAN:  Liver mass   This is benign and does not need further treatment  She is status ultrasound guided biopsy of liver mass. 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 No evidence of malignancy  Serologic cancer markers were also negative. Vaccinations   Routine vaccinations against other bacterial and viral agents can be performed as indicated. Annual flu vaccination should be administered if indicated.   We will screen patient for HCV based on USPSTF recommendations    ALLERGIES  Allergies   Allergen Reactions    Penicillins Shortness of Breath and Rash       MEDICATIONS  Current Outpatient Medications Medication Sig    cholecalciferol (Vitamin D3) 25 mcg (1,000 unit) cap Take  by mouth daily.  aspirin delayed-release 81 mg tablet Take 1 Tab by mouth daily.  atorvastatin (LIPITOR) 40 mg tablet Take 1 Tab by mouth daily for 90 days. Indications: hardening of the arteries due to plaque buildup    OTHER daily. Prevagen for memory    ibuprofen (ADVIL) 200 mg tablet Take 200 mg by mouth as needed for Pain.  COQ10, LIPOSOMAL UBIQUINOL, PO Take  by mouth.  traMADol (ULTRAM) 50 mg tablet Take 1 Tab by mouth every six (6) hours as needed for Pain. Max Daily Amount: 200 mg. No current facility-administered medications for this visit. SYSTEM REVIEW NOT RELATED TO LIVER DISEASE OR REVIEWED ABOVE:  Constitution systems: Negative for fever, chills, weight gain, weight loss. Eyes: Negative for visual changes. ENT: Negative for sore throat, painful swallowing. Respiratory: Negative for cough, hemoptysis, SOB. Cardiology: Negative for chest pain, palpitations. GI:  Negative for constipation or diarrhea. : Negative for urinary frequency, dysuria, hematuria, nocturia. Skin: Negative for rash. Hematology: Negative for easy bruising, blood clots. Musculo-skelatal: Positive for upper back pain. Negative for muscle pain, and weakness. Neurologic: Negative for headaches, dizziness, vertigo, memory problems not related to HE. Psychology: Negative for anxiety, depression. FAMILY HISTORY:  The parents are   There is no family history of liver disease. Daughter has SLE    SOCIAL HISTORY:  The patient is . The patient has 3 children. 8 grand chilrden and 8 great grandchildren    The patient stopped using tobacco products in .  She smoked for about 30-40 years    The patient drinks wine socially    PHYSICAL EXAMINATION:  Visit Vitals  /76 (BP 1 Location: Left arm, BP Patient Position: Sitting)   Pulse 77   Temp (!) 96.1 °F (35.6 °C) (Temporal)   Resp 16   Ht 5' 4\" (1.626 m)   Wt 137 lb 6.4 oz (62.3 kg)   SpO2 98%   BMI 23.58 kg/m²     General: No acute distress. Eyes: Sclera anicteric. ENT: No oral lesions. Thyroid normal.  Nodes: No adenopathy. Skin: No spider angiomata. No jaundice. No palmar erythema. Respiratory: Lungs clear to auscultation. Cardiovascular: Regular heart rate. No murmurs. No JVD. Abdomen: Soft non-tender. Liver size normal to percussion/palpation. Spleen not palpable. No obvious ascites. Extremities: No edema. No muscle wasting. No gross arthritic changes. Neurologic: Alert and oriented. Cranial nerves grossly intact. No asterixis. LABORATORY STUDIES:  Recent liver function panel, CBC with platelet count and BMP are not available. These studies will be performed. [facility] of [address] [place] [address] Units 11/5/2020 9/30/2020   WBC 3.4 - 10.8 x10E3/uL 6.4 7.9   ANC 1.4 - 7.0 x10E3/uL 3.3    HGB 11.1 - 15.9 g/dL 16.3 (H) 15.5    - 450 x10E3/uL 293 277.0   INR 0.9 - 1.2 1.0    AST 0 - 40 IU/L 19 28.0   ALT 0 - 32 IU/L 19 21   Alk Phos 39 - 117 IU/L 87 76   Bili, Total 0.0 - 1.2 mg/dL 0.2 0.5   Albumin 3.7 - 4.7 g/dL 4.8 (H) 4.4   BUN 8 - 27 mg/dL 21 23.0 (H)   Creat 0.57 - 1.00 mg/dL 0.72 0.7   Na 134 - 144 mmol/L 142 144   K 3.5 - 5.2 mmol/L 4.6 4.5   Cl 96 - 106 mmol/L 103 106   CO2 20 - 29 mmol/L 24 28.0   Glucose 65 - 99 mg/dL 119 (H) 121 (H)       SEROLOGIES:  Not available or performed. Testing was performed today. Cancer Screening Latest Ref Rng & Units 11/5/2020   AFP, Serum 0.0 - 8.0 ng/mL 3.9   AFP-L3% 0.0 - 9.9 % Comment   CA 19-9 0 - 35 U/mL <2   CEA 0.0 - 4.7 ng/mL 3.2       LIVER HISTOLOGY:  11/2020: CT guided biopsy: negative for malignancy.  Pathology revealed a pauci cellular hyalinized fibrosis with areas of calcification not diagnostic of a specific lesion but may be compatible with a sclerosed    RADIOLOGY:  09/2020:  CT scan of the chest: Coarse area of calcification in the right lobe of the liver. 10/2020: Indeterminate coarse calcification containing hypodense lesion in the right lobe of the liver with features compatible with epithelioid hemangioendothelioma or other neoplasm such as metastatic mucinous carcinoma. A second lesion in anterior segment 2 could reflect flash fill hemangioma or very small lesion of same etiology. There was also a 1. 2 pulmonary nodules measuring 7 x 5 mm    OTHER TESTING:  Not available or performed    FOLLOW-UP:  All of the issues listed above in the Assessment and Plan were discussed with the patient. All questions were answered. The patient expressed a clear understanding of the above.     Follow-up [facility] [place] as needed    Lyn Sheehan MD, MPH  Advanced Hepatology  [facility] of [address], [address] [place] [address] 22.  [phone]  [address] [place] [address]

## 2020-11-26 LAB
COMMENT, 144067: NORMAL
HAV AB SER QL IA: NEGATIVE
HBV SURFACE AB SER QL: NON REACTIVE
HCV AB S/CO SERPL IA: <0.1 S/CO RATIO (ref 0–0.9)

## 2020-12-17 ENCOUNTER — HOSPITAL ENCOUNTER (OUTPATIENT)
Dept: MRI IMAGING | Age: 80
Discharge: HOME | End: 2020-12-17
Attending: PSYCHIATRY & NEUROLOGY
Payer: MEDICARE

## 2020-12-17 DIAGNOSIS — R41.3 MEMORY CHANGE: ICD-10-CM

## 2020-12-17 PROCEDURE — 70551 MRI BRAIN STEM W/O DYE: CPT

## 2020-12-29 ENCOUNTER — TELEPHONE (OUTPATIENT)
Dept: INTERNAL MEDICINE CLINIC | Age: 80
End: 2020-12-29

## 2020-12-29 ENCOUNTER — TELEPHONE (OUTPATIENT)
Dept: NEUROLOGY | Age: 80
End: 2020-12-29

## 2020-12-29 NOTE — TELEPHONE ENCOUNTER
Please call, the patient is scared and calling the police, screaming and going outside screaming and she was wondering what she can do.

## 2020-12-29 NOTE — TELEPHONE ENCOUNTER
Patients granddaughter called needing medical advice. Granddaughter states that the patient has been to the neurologist and is awaiting results and is scheduled to see a psychologist. Patient is having more frequent \"episodes\". Granddaughter states this is making life at home more difficult. Please Advise and call granddaughter. She wants to know if they are just supposed to wait for the  Psychology appointment.   CB: [phone]

## 2020-12-30 NOTE — TELEPHONE ENCOUNTER
MD Anup Goldsmith LPN; Zan Mcdermott 13 hours ago (9:21 PM)     I am on vacation this week but more than happy to see the patient in January. Please follow-up with neurologist as well.  Do not cancel neuropsychology testing.     Message text

## 2020-12-30 NOTE — TELEPHONE ENCOUNTER
Spoke to patient's granddaughter and per Dr. Chalino Petersen I told her to keep the scheduled appointments and to follow up with Dr. Chalino Petersen in January

## 2021-01-05 ENCOUNTER — TRANSCRIBE ORDER (OUTPATIENT)
Dept: REGISTRATION | Age: 81
End: 2021-01-05

## 2021-01-05 ENCOUNTER — HOSPITAL ENCOUNTER (OUTPATIENT)
Dept: MAMMOGRAPHY | Age: 81
Discharge: HOME | End: 2021-01-05
Attending: INTERNAL MEDICINE
Payer: MEDICARE

## 2021-01-05 DIAGNOSIS — Z12.31 VISIT FOR SCREENING MAMMOGRAM: Primary | ICD-10-CM

## 2021-01-05 DIAGNOSIS — Z12.31 VISIT FOR SCREENING MAMMOGRAM: ICD-10-CM

## 2021-01-05 PROCEDURE — 77067 SCR MAMMO BI INCL CAD: CPT

## 2021-01-11 ENCOUNTER — TELEPHONE (OUTPATIENT)
Dept: CARDIOLOGY CLINIC | Age: 81
End: 2021-01-11

## 2021-01-11 ENCOUNTER — ANCILLARY PROCEDURE (OUTPATIENT)
Dept: CARDIOLOGY CLINIC | Age: 81
End: 2021-01-11
Payer: MEDICARE

## 2021-01-11 VITALS
BODY MASS INDEX: 23.39 KG/M2 | WEIGHT: 137 LBS | SYSTOLIC BLOOD PRESSURE: 125 MMHG | HEIGHT: 64 IN | DIASTOLIC BLOOD PRESSURE: 76 MMHG

## 2021-01-11 VITALS
WEIGHT: 135 LBS | HEIGHT: 64 IN | BODY MASS INDEX: 23.05 KG/M2 | SYSTOLIC BLOOD PRESSURE: 138 MMHG | DIASTOLIC BLOOD PRESSURE: 88 MMHG

## 2021-01-11 LAB
ECHO AO ASC DIAM: 2.81 CM
ECHO AO ROOT DIAM: 2.99 CM
ECHO AV AREA PEAK VELOCITY: 3.05 CM2
ECHO AV AREA/BSA PEAK VELOCITY: 1.8 CM2/M2
ECHO AV PEAK GRADIENT: 4.33 MMHG
ECHO AV PEAK VELOCITY: 104.01 CM/S
ECHO LA AREA 4C: 11.76 CM2
ECHO LA MAJOR AXIS: 2.63 CM
ECHO LA MINOR AXIS: 1.58 CM
ECHO LA VOL 2C: 39.68 ML (ref 22–52)
ECHO LA VOL 4C: 25.32 ML (ref 22–52)
ECHO LA VOL BP: 35.6 ML (ref 22–52)
ECHO LA VOL/BSA BIPLANE: 21.38 ML/M2 (ref 16–28)
ECHO LA VOLUME INDEX A2C: 23.82 ML/M2 (ref 16–28)
ECHO LA VOLUME INDEX A4C: 15.2 ML/M2 (ref 16–28)
ECHO LV E' LATERAL VELOCITY: 11.41 CM/S
ECHO LV E' SEPTAL VELOCITY: 8.72 CM/S
ECHO LV INTERNAL DIMENSION DIASTOLIC: 3.3 CM (ref 3.9–5.3)
ECHO LV INTERNAL DIMENSION SYSTOLIC: 2.35 CM
ECHO LV IVSD: 1.08 CM (ref 0.6–0.9)
ECHO LV MASS 2D: 107.6 G (ref 67–162)
ECHO LV MASS INDEX 2D: 64.6 G/M2 (ref 43–95)
ECHO LV POSTERIOR WALL DIASTOLIC: 1.1 CM (ref 0.6–0.9)
ECHO LVOT DIAM: 1.96 CM
ECHO LVOT PEAK GRADIENT: 4.38 MMHG
ECHO LVOT PEAK VELOCITY: 104.7 CM/S
ECHO LVOT SV: 63 ML
ECHO LVOT VTI: 20.8 CM
ECHO MV A VELOCITY: 79.06 CM/S
ECHO MV AREA PHT: 4.57 CM2
ECHO MV E DECELERATION TIME (DT): 165.99 MS
ECHO MV E VELOCITY: 69.46 CM/S
ECHO MV E/A RATIO: 0.88
ECHO MV E/E' LATERAL: 6.09
ECHO MV E/E' RATIO (AVERAGED): 7.03
ECHO MV E/E' SEPTAL: 7.97
ECHO MV PRESSURE HALF TIME (PHT): 48.14 MS
ECHO RV TAPSE: 1.3 CM (ref 1.5–2)
STRESS BASELINE DIAS BP: 88 MMHG
STRESS BASELINE HR: 71 BPM
STRESS BASELINE SYS BP: 138 MMHG
STRESS PEAK DIAS BP: 80 MMHG
STRESS PEAK SYS BP: 152 MMHG
STRESS PERCENT HR ACHIEVED: 80 %
STRESS POST PEAK HR: 113 BPM
STRESS RATE PRESSURE PRODUCT: NORMAL BPM*MMHG
STRESS ST DEPRESSION: 0 MM
STRESS ST ELEVATION: 0 MM
STRESS TARGET HR: 142 BPM

## 2021-01-11 PROCEDURE — 93016 CV STRESS TEST SUPVJ ONLY: CPT | Performed by: INTERNAL MEDICINE

## 2021-01-11 PROCEDURE — 78452 HT MUSCLE IMAGE SPECT MULT: CPT | Performed by: INTERNAL MEDICINE

## 2021-01-11 PROCEDURE — 93306 TTE W/DOPPLER COMPLETE: CPT | Performed by: INTERNAL MEDICINE

## 2021-01-11 PROCEDURE — A9502 TC99M TETROFOSMIN: HCPCS | Performed by: INTERNAL MEDICINE

## 2021-01-11 PROCEDURE — 93018 CV STRESS TEST I&R ONLY: CPT | Performed by: INTERNAL MEDICINE

## 2021-01-11 RX ADMIN — TETROFOSMIN 11.3 MILLICURIE: 1.38 INJECTION, POWDER, LYOPHILIZED, FOR SOLUTION INTRAVENOUS at 12:45

## 2021-01-11 RX ADMIN — TETROFOSMIN 32.4 MILLICURIE: 1.38 INJECTION, POWDER, LYOPHILIZED, FOR SOLUTION INTRAVENOUS at 13:35

## 2021-01-11 RX ADMIN — REGADENOSON 0.4 MG: 0.08 INJECTION, SOLUTION INTRAVENOUS at 13:35

## 2021-01-11 NOTE — TELEPHONE ENCOUNTER
MD Amor Hernandez, JERRYN             Echo shows normal heart function and valves. narayanx. Jen informed on Hippa Verified patient with two identifiers. She3 is concerned with the state of distress her mother was in when she returned home she has cognitive impairment and wants to know how this can be avoided in the furture please advise thanks.

## 2021-01-11 NOTE — TELEPHONE ENCOUNTER
Spoke with [facility]. Apologized for the misunderstanding. Notified [facility] that we will have a policy in place that will allow visitors for cognitive impairment going forward. All of questions were answered. [facility] seemed to be pleased with the direction of the conversation.

## 2021-01-11 NOTE — TELEPHONE ENCOUNTER
----- Message from Donovan Coon MD sent at 1/11/2021 12:45 PM EST -----  Echo shows normal heart function and valves.  thx.

## 2021-01-12 ENCOUNTER — TELEPHONE (OUTPATIENT)
Dept: CARDIOLOGY CLINIC | Age: 81
End: 2021-01-12

## 2021-01-12 NOTE — TELEPHONE ENCOUNTER
Jen informed on Hippa Verified patient with two identifiers. C/O of bilateral shoulder pain will continue to monitor and discuss with 2/15/21 appt with Dr Severa Blue.

## 2021-01-12 NOTE — TELEPHONE ENCOUNTER
----- Message from Magalie Calvo NP sent at 1/12/2021  8:34 AM EST -----  Please let pt know the stress test is neg for indications of blood flow blockage.  If still symptomatic follow up with Dr Blanquita Hurst to discuss

## 2021-01-12 NOTE — PROGRESS NOTES
Renea Dougherty is a 68 y.o. female and presents with Cholesterol Problem and Hypertension      Subjective:  She used to work in patient registration at [employer] and used to work part-time years back at [employer]. She is in attendance with her granddaughter Nas Hubbard. Patient had a virtual visit with Dr. Walker Hui on 11/13/2020 for evaluation of her cognitive impairment. There was concern of Alzheimer's dementia and MRI was ordered to exclude vascular component/small vessel ischemia as a cause of memory impairment. MRI reviewed which was unremarkable. Jen/granddaughter states that patient is now having panic attacks and infrequent confusion. She is scheduled for neuropsych testing tomorrow    Patient had a low-dose CT scan done for lung cancer screening which revealed stable coronary artery calcifications. She was started on aspirin and statin. The plan to do an echo and Lexiscan stress test.  Mixed hyperlipidemia recently started on statin. However patient does not picked up the prescription yet. Patient was also followed with hepatology [place] for concern of a liver mass. Recent LFTs, cancer serologies for AFP, CA 199 and CEA were all negative. CT-guided biopsy for liver mass performed on 11/2020 was negative for malignancy. No further treatment advised. Recap-  Patient reports she has not seen a family physician for over 10 years now. She has not had routine screenings done and is long overdue for her routine mammogram, Pap smear, colonoscopy, DEXA scan. She has not had lab work done in a long while. She has noticed some mild cognitive impairment and issues with short-term memory loss. She has been taking PreviDent. She has not seen a neurologist.  No head CT or MRI was ever done. She is still driving and pretty independent in caring her ADLs. She had a fall in August and hurt her right arm and right side of her leg.   Denies any fractures. She still has some pain with flexion extension and restricted range of motion. Past Medical History:   Diagnosis Date    Arthritis     bilateral hands, left knee - pt got injections    Carpal tunnel syndrome     bilateral hands    Dental infection ~    progressed to back of neck, pt had sx that consisted of taking bone from her right hip to replace in her spinal coloum; pt in halo for 3 months    Menopause      Past Surgical History:   Procedure Laterality Date    HX CARPAL TUNNEL RELEASE Right 2018    open    HX HYSTERECTOMY      HX OOPHORECTOMY      HX TONSILLECTOMY      HX UROLOGICAL  ~    Back surgery:  pt reports she had dental inf. that went to back of neck and ortho surgeon took pieces of bone from right hip to replace in spinal column; pt reports she wore halo for 3 months:  Dr. Silvestre Harmon (sx done at [facility])     Allergies   Allergen Reactions    Penicillins Shortness of Breath and Rash     Current Outpatient Medications   Medication Sig Dispense Refill    cholecalciferol (Vitamin D3) 25 mcg (1,000 unit) cap Take  by mouth daily.  aspirin delayed-release 81 mg tablet Take 1 Tab by mouth daily. 30 Tab 11    OTHER daily. Prevagen for memory      ibuprofen (ADVIL) 200 mg tablet Take 200 mg by mouth as needed for Pain.  COQ10, LIPOSOMAL UBIQUINOL, PO Take  by mouth.  atorvastatin (LIPITOR) 40 mg tablet Take 1 Tab by mouth daily for 90 days.  Indications: hardening of the arteries due to plaque buildup 90 Tab 0     Social History     Socioeconomic History    Marital status:      Spouse name: Not on file    Number of children: Not on file    Years of education: Not on file    Highest education level: Not on file   Tobacco Use    Smoking status: Former Smoker     Packs/day: 1.00     Years: 20.00     Pack years: 20.00     Quit date:      Years since quittin.0    Smokeless tobacco: Never Used   Substance and Sexual Activity    Alcohol use: Yes     Alcohol/week: 3.0 standard drinks     Types: 3 Glasses of wine per week     Comment: weekly    Drug use: No    Sexual activity: Not Currently     Family History   Problem Relation Age of Onset    Cancer Daughter    Lester Loser Parkinson's Disease Mother     Coronary Artery Disease Mother     Coronary Artery Disease Brother     High Cholesterol Brother        Review of Systems  ROS is unremarkable except for ones highlighted in bold.    Constitutional: negative for fevers, chills, anorexia and weight loss  Eyes:   negative for visual disturbance and irritation  ENT:   negative for tinnitus,sore throat,nasal congestion,ear pain,hoarseness  Respiratory:  negative for cough, hemoptysis, dyspnea,wheezing  CV:   negative for chest pain, palpitations, lower extremity edema  GI:   negative for nausea, vomiting, diarrhea, abdominal pain,melena  Endo:               negative for polyuria,polydipsia,polyphagia,heat intolerance  Genitourinary: negative for frequency, dysuria and hematuria  Integumentary: negative for rash and pruritus  Hematologic:  negative for easy bruising and gum/nose bleeding  Musculoskel: negative for myalgias, arthralgias, back pain, muscle weakness, joint pain  Neurological:  negative for headaches, dizziness, vertigo, memory problems and gait   Behavl/Psych: negative for feelings of anxiety, depression, mood changes  ROS otherwise negative     Objective:  Visit Vitals  /82 (BP 1 Location: Left arm, BP Patient Position: Sitting)   Pulse 82   Temp 97.9 °F (36.6 °C)   Resp 16   Ht 5' 4\" (1.626 m)   Wt 137 lb (62.1 kg)   SpO2 96%   BMI 23.52 kg/m²     Physical Exam:   General appearance - alert, well appearing, and in no distress  Mental status - alert, oriented to person, place, and time  EYE-AMELIE, EOMI, fundi normal, corneas normal, no foreign bodies  ENT-ENT exam normal, no neck nodes or sinus tenderness  Nose - normal and patent, no erythema, discharge or polyps  Mouth - mucous membranes moist, pharynx normal without lesions  Neck - supple, no significant adenopathy   Chest - clear to auscultation, no wheezes, rales or rhonchi, symmetric air entry   Heart - normal rate, regular rhythm, normal S1, S2, no murmurs, rubs, clicks or gallops   Abdomen - soft, nontender, nondistended, no masses or organomegaly  Lymph- no adenopathy palpable  Ext-peripheral pulses normal, no pedal edema, no clubbing or cyanosis  Skin-Warm and dry. no hyperpigmentation, vitiligo, or suspicious lesions  Neuro -alert, oriented, normal speech, no focal findings or movement disorder noted  Musculoskeletal- FROM, no bony abnormalities, right hand point tenderness,    Lab Review:  Results for orders placed or performed in visit on 11/25/20   HEP B SURFACE AB   Result Value Ref Range    HEP B SURFACE AB, QUAL Non Reactive    HCV AB W/REFLEX VERIFICATION   Result Value Ref Range    HCV Ab <0.1 0.0 - 0.9 s/co ratio   COMMENT   Result Value Ref Range    Comment Comment    HEP A AB, TOTAL   Result Value Ref Range    Hep A Ab, total Negative Negative        Documenation Review:    Assessment/Plan:    Diagnoses and all orders for this visit:    1. Early onset Alzheimer's disease with behavioral disturbance ([facility]    2. Liver mass    3. Coronary artery disease due to calcified coronary lesion    4. Mixed hyperlipidemia    5. Encounter before starting medication  -     CBC W/O DIFF  -     METABOLIC PANEL, COMPREHENSIVE  -     AMB POC EKG ROUTINE W/ 12 LEADS, INTER & REP    6. Labile hypertension  -     CBC W/O DIFF  -     METABOLIC PANEL, COMPREHENSIVE  -     AMB POC EKG ROUTINE W/ 12 LEADS, INTER & REP        Asked patient to discuss with the neurologist if they have any plans to start on Aricept and Namenda. Given her behavioral disturbances she might benefit from a low-dose of Seroquel. Will defer to neurology. I will get baseline labs and EKG today.    I will call with lab results and make further recommendations or adjustments if necessary. Discussed lifestyle modifications including Na restriction, low carb/fat diet, weight reduction and exercise (at least a walking program). ICD-10-CM ICD-9-CM    1. Early onset [facility]'s disease with behavioral disturbance ([facility]  G30.0 331.0     F02.81 294.11    2. Liver mass  R16.0 573.8    3. Coronary artery disease due to calcified coronary lesion  I25.10 414.00     I25.84 414.4    4. Mixed hyperlipidemia  E78.2 272.2    5. Encounter before starting medication  Z76.89 V70.9 CBC W/O DIFF      METABOLIC PANEL, COMPREHENSIVE      AMB POC EKG ROUTINE W/ 12 LEADS, INTER & REP   6. Labile hypertension  R09.89 401.9 CBC W/O DIFF      METABOLIC PANEL, COMPREHENSIVE      AMB POC EKG ROUTINE W/ 12 LEADS, INTER & REP             I have reviewed with the patient details of the assessment and plan and all questions were answered. Relevent patient education was performed. Verbal and/or written instructions (see AVS) provided. The most recent lab findings were reviewed with the patient. Plan was discussed with patient who verbally expressed understanding. An After Visit Summary was printed and given to the patient.     Christiano Aguila MD

## 2021-01-13 ENCOUNTER — OFFICE VISIT (OUTPATIENT)
Dept: INTERNAL MEDICINE CLINIC | Age: 81
End: 2021-01-13
Payer: MEDICARE

## 2021-01-13 VITALS
RESPIRATION RATE: 16 BRPM | OXYGEN SATURATION: 96 % | BODY MASS INDEX: 23.39 KG/M2 | HEART RATE: 82 BPM | TEMPERATURE: 97.9 F | WEIGHT: 137 LBS | DIASTOLIC BLOOD PRESSURE: 82 MMHG | SYSTOLIC BLOOD PRESSURE: 130 MMHG | HEIGHT: 64 IN

## 2021-01-13 DIAGNOSIS — E78.2 MIXED HYPERLIPIDEMIA: ICD-10-CM

## 2021-01-13 DIAGNOSIS — I25.10 CORONARY ARTERY DISEASE DUE TO CALCIFIED CORONARY LESION: ICD-10-CM

## 2021-01-13 DIAGNOSIS — G30.0 EARLY ONSET ALZHEIMER'S DISEASE WITH BEHAVIORAL DISTURBANCE (HCC): Primary | ICD-10-CM

## 2021-01-13 DIAGNOSIS — F02.818 EARLY ONSET ALZHEIMER'S DISEASE WITH BEHAVIORAL DISTURBANCE (HCC): Primary | ICD-10-CM

## 2021-01-13 DIAGNOSIS — Z76.89 ENCOUNTER BEFORE STARTING MEDICATION: ICD-10-CM

## 2021-01-13 DIAGNOSIS — I25.84 CORONARY ARTERY DISEASE DUE TO CALCIFIED CORONARY LESION: ICD-10-CM

## 2021-01-13 DIAGNOSIS — R09.89 LABILE HYPERTENSION: ICD-10-CM

## 2021-01-13 DIAGNOSIS — R16.0 LIVER MASS: ICD-10-CM

## 2021-01-13 LAB
A-G RATIO,AGRAT: 1.5 RATIO
ALBUMIN SERPL-MCNC: 4 G/DL (ref 3.9–5.4)
ALP SERPL-CCNC: 76 U/L (ref 38–126)
ALT SERPL-CCNC: 17 U/L (ref 0–35)
ANION GAP SERPL CALC-SCNC: 8 MMOL/L
AST SERPL W P-5'-P-CCNC: 23 U/L (ref 14–36)
BILIRUB SERPL-MCNC: 0.5 MG/DL (ref 0.2–1.3)
BUN SERPL-MCNC: 23 MG/DL (ref 7–17)
BUN/CREATININE RATIO,BUCR: 38 RATIO
CALCIUM SERPL-MCNC: 9.3 MG/DL (ref 8.4–10.2)
CHLORIDE SERPL-SCNC: 103 MMOL/L (ref 98–107)
CO2 SERPL-SCNC: 32 MMOL/L (ref 22–32)
CREAT SERPL-MCNC: 0.6 MG/DL (ref 0.7–1.2)
ERYTHROCYTE [DISTWIDTH] IN BLOOD BY AUTOMATED COUNT: 12.3 %
GLOBULIN,GLOB: 2.7
GLUCOSE SERPL-MCNC: 106 MG/DL (ref 65–105)
HCT VFR BLD AUTO: 48.6 % (ref 37–51)
HGB BLD-MCNC: 15.3 G/DL (ref 12–18)
MCH RBC QN AUTO: 30.2 PG (ref 26–32)
MCHC RBC AUTO-ENTMCNC: 31.5 G/DL (ref 30–36)
MCV RBC AUTO: 95.9 FL (ref 80–97)
PLATELET # BLD AUTO: 259 K/UL (ref 140–440)
POTASSIUM SERPL-SCNC: 4.6 MMOL/L (ref 3.6–5)
PROT SERPL-MCNC: 6.7 G/DL (ref 6.3–8.2)
RBC # BLD AUTO: 5.07 M/UL (ref 4.2–6.3)
SODIUM SERPL-SCNC: 143 MMOL/L (ref 137–145)
WBC # BLD AUTO: 6.5 K/UL (ref 4.1–10.9)

## 2021-01-13 PROCEDURE — 93000 ELECTROCARDIOGRAM COMPLETE: CPT | Performed by: INTERNAL MEDICINE

## 2021-01-13 PROCEDURE — G8399 PT W/DXA RESULTS DOCUMENT: HCPCS | Performed by: INTERNAL MEDICINE

## 2021-01-13 PROCEDURE — 80053 COMPREHEN METABOLIC PANEL: CPT | Performed by: INTERNAL MEDICINE

## 2021-01-13 PROCEDURE — 1100F PTFALLS ASSESS-DOCD GE2>/YR: CPT | Performed by: INTERNAL MEDICINE

## 2021-01-13 PROCEDURE — G8536 NO DOC ELDER MAL SCRN: HCPCS | Performed by: INTERNAL MEDICINE

## 2021-01-13 PROCEDURE — G8427 DOCREV CUR MEDS BY ELIG CLIN: HCPCS | Performed by: INTERNAL MEDICINE

## 2021-01-13 PROCEDURE — 3288F FALL RISK ASSESSMENT DOCD: CPT | Performed by: INTERNAL MEDICINE

## 2021-01-13 PROCEDURE — 1090F PRES/ABSN URINE INCON ASSESS: CPT | Performed by: INTERNAL MEDICINE

## 2021-01-13 PROCEDURE — 85027 COMPLETE CBC AUTOMATED: CPT | Performed by: INTERNAL MEDICINE

## 2021-01-13 PROCEDURE — 99214 OFFICE O/P EST MOD 30 MIN: CPT | Performed by: INTERNAL MEDICINE

## 2021-01-13 PROCEDURE — G8510 SCR DEP NEG, NO PLAN REQD: HCPCS | Performed by: INTERNAL MEDICINE

## 2021-01-13 PROCEDURE — G8420 CALC BMI NORM PARAMETERS: HCPCS | Performed by: INTERNAL MEDICINE

## 2021-01-13 NOTE — PATIENT INSTRUCTIONS
The best way to stay healthy is to live a healthy lifestyle. A healthy lifestyle includes regular exercise, eating a well-balanced diet, keeping a healthy weight and not smoking. Regular physical exams and screening tests are another important way to take care of yourself. Preventive exams provided by health care providers can find health problems early when treatment works best and can keep you from getting certain diseases or illnesses. Preventive services include exams, lab tests, screenings, shots, monitoring and information to help you take care of your own health. All people over 65 should have a pneumonia shot. Pneumonia shots are usually only needed once in a lifetime unless your doctor decides differently. In addition to your physical exam, some screening tests are recommended: 
 
All people over 65 should have a yearly flu shot. People over 65 are at medium to high risk for Hepatitis B. Three shots are needed for complete protection. Bone mass measurement (dexa scan) is recommended every two years. Diabetes Mellitus screening is recommended every year. Glaucoma is an eye disease caused by high pressure in the eye. An eye exam is recommended every year. Cardiovascular screening tests that check your cholesterol and other blood fat (lipid) levels are recommended every five years. Colorectal Cancer screening tests help to find pre-cancerous polyps (growths in the colon) so they can be removed before they turn into cancer. Tests ordered for screening depend on your personal and family history risk factors. Prostate Cancer Screening (annually up to age 76) Screening for breast cancer is recommended yearly with a Mammogram. 
 
 Screening for cervical and vaginal cancer is recommended with a pelvic and Pap test every two years. However if you have had an abnormal pap in the past  three years or at high risk for cervical or vaginal cancer Medicare will cover a pap test and a pelvic exam every year. Here is a list of your current Health Maintenance items with a due date: 
Health Maintenance Due Topic Date Due  
 DTaP/Tdap/Td  (1 - Tdap) 10/22/1963  Shingles Vaccine (1 of 2) 10/22/1992  Glaucoma Screening   10/22/2007  Pneumococcal Vaccine (1 of 1 - PPSV23) 10/22/2007  Pneumococcal Vaccine 65+ years at Risk (1 of 2 - PCV13) 10/22/2007 Bryan [facility] Annual Well Visit  10/23/2018

## 2021-01-13 NOTE — PROGRESS NOTES
Chace Resendiz is a 68 y.o. female presenting for Cholesterol Problem and Hypertension  . 1. Have you been to the ER, urgent care clinic since your last visit? Hospitalized since your last visit? No    2. Have you seen or consulted any other health care providers outside of the [address] [place] [address] since your last visit? Include any pap smears or colon screening. No    Fall Risk Assessment, last 12 mths 1/13/2021   Able to walk? Yes   Fall in past 12 months? 1   Do you feel unsteady? 0   Are you worried about falling 0   Is TUG test greater than 12 seconds? 0   Is the gait abnormal? 0   Number of falls in past 12 months 1   Fall with injury? 0         Abuse Screening Questionnaire 1/13/2021   Do you ever feel afraid of your partner? N   Are you in a relationship with someone who physically or mentally threatens you? N   Is it safe for you to go home? Y       3 most recent PHQ Screens 1/13/2021   Little interest or pleasure in doing things Several days   Feeling down, depressed, irritable, or hopeless Several days   Total Score PHQ 2 2       There are no discontinued medications.

## 2021-01-18 ENCOUNTER — TELEPHONE (OUTPATIENT)
Dept: INTERNAL MEDICINE CLINIC | Age: 81
End: 2021-01-18

## 2021-01-18 NOTE — TELEPHONE ENCOUNTER
Patients granddaughter called wanting advice. The patient saw where the granddaughter has been talking to Dr. Lottie Hodgkins about her anxiety/panic attacks and now is refusing to go to her  Psychology appointments. Granddaughter is looking for advice on how to proceed.   CB: [phone]

## 2021-01-19 NOTE — TELEPHONE ENCOUNTER
Veronica Wisdom MD  You 17 hours ago (4:50 PM)     Discussed with Jen/great granddaughter and power of . I have asked to make sure if it is okay with a neurologist Dr. Jonah Christianson that we start patient on low-dose of Aricept, Namenda and Seroquel.  We did baseline EKG and lab work which were unremarkable. Granddaughter is going to talk to neurologist tomorrow and get back to us.

## 2021-01-25 ENCOUNTER — TELEPHONE (OUTPATIENT)
Dept: INTERNAL MEDICINE CLINIC | Age: 81
End: 2021-01-25

## 2021-01-25 DIAGNOSIS — G30.0 EARLY ONSET ALZHEIMER'S DISEASE WITH BEHAVIORAL DISTURBANCE (HCC): Primary | ICD-10-CM

## 2021-01-25 DIAGNOSIS — F02.818 EARLY ONSET ALZHEIMER'S DISEASE WITH BEHAVIORAL DISTURBANCE (HCC): Primary | ICD-10-CM

## 2021-01-25 RX ORDER — QUETIAPINE FUMARATE 25 MG/1
25 TABLET, FILM COATED ORAL
Qty: 30 TAB | Refills: 0 | Status: SHIPPED | OUTPATIENT
Start: 2021-01-25 | End: 2021-02-24

## 2021-01-25 RX ORDER — DONEPEZIL HYDROCHLORIDE 5 MG/1
5 TABLET, FILM COATED ORAL
Qty: 30 TAB | Refills: 0 | Status: SHIPPED | OUTPATIENT
Start: 2021-01-25 | End: 2021-07-29 | Stop reason: SDUPTHER

## 2021-01-25 NOTE — TELEPHONE ENCOUNTER
Kala Turcios MD  You; Shey Colón 2 minutes ago (4:29 PM)     Please start patient on Aricept 5 mg p.o. daily.   Seroquel 25 mg p.o. nightly.     Continue the above 2 medications.  We will follow-up in 4 weeks.   Please call patient and let her know about the meds.  Sent to her preferred pharmacy    Message text        Patients caretaker notified

## 2021-01-25 NOTE — TELEPHONE ENCOUNTER
Granddaughter called and stated the same information as her my chart message. \"Good afternoon,     I was following up with Dr. Vijaya Romero on a conversation about prescriptions for my grandmother, Dudley Mario. Dr. Vijaya Romero asked that I reach out to the neurologist to see if they were going to start her on Aricept, Namenda and Seroquel. I did reach out last week but have not received a response or call back.  My grandmother is willing to take the medication now so I was hoping Dr. Vijaya Romero could go ahead and prescribe the medication.     Thank you,     Yash Anthony  [phone]\"  CB: [phone]

## 2021-01-29 ENCOUNTER — TELEPHONE (OUTPATIENT)
Dept: INTERNAL MEDICINE CLINIC | Age: 81
End: 2021-01-29

## 2021-01-29 NOTE — TELEPHONE ENCOUNTER
The patients granddaughter called stating the patient has now decided she is not going to take the aricept and seroquel. The patient has been paranoid lately stating her granddaughter is trying to hurt her and make her a psych patient. The granddaughter is looking for advice on what to do. The patient is not taking the medication and is now locking doors to keep the granddaughter out.   CB: [phone]

## 2021-01-29 NOTE — TELEPHONE ENCOUNTER
Hugh Choi MD  You 1 hour ago (3:08 PM)     Recommend to take to [facility] if the patient is having paranoid ideations.  They have psych and can be evaluated earlier      Patients grand daughter notified

## 2021-02-01 DIAGNOSIS — I25.83 CORONARY ARTERY DISEASE DUE TO LIPID RICH PLAQUE: ICD-10-CM

## 2021-02-01 DIAGNOSIS — I25.10 CORONARY ARTERY DISEASE DUE TO LIPID RICH PLAQUE: ICD-10-CM

## 2021-02-01 RX ORDER — ATORVASTATIN CALCIUM 40 MG/1
TABLET, FILM COATED ORAL
Qty: 90 TAB | Refills: 0 | Status: SHIPPED | OUTPATIENT
Start: 2021-02-01 | End: 2021-07-29

## 2021-06-02 ENCOUNTER — DOCUMENTATION ONLY (OUTPATIENT)
Dept: INTERNAL MEDICINE CLINIC | Age: 81
End: 2021-06-02

## 2021-06-04 NOTE — PROGRESS NOTES
Little Parada MD  You Just now (4:29 PM)   Waqar Loera  Acknowledged.  Appreciate your help [facility] text

## 2021-06-04 NOTE — PROGRESS NOTES
Diego Johnson MD  You 2 days ago   [facility]  Patient is overdue for follow-up.  Please call and schedule inpatient office visit    Routing comment      I spoke with the patient directly. She states \" I am fine and I am doing fine. I do not need to see Dr. Jean Paul Lainez". I stated we had not seen her in some time and she needs to see Dr. Vonnie Carver to be evaluated. Also notified her she is overdue for a repeat CT scan. She states\" I am fine. I will call her this week but I am fine\". Notified patient if she is not willing to be seen she might want to look for a new PCP. She just states \" ok bye\".

## 2021-07-29 ENCOUNTER — OFFICE VISIT (OUTPATIENT)
Dept: INTERNAL MEDICINE CLINIC | Age: 81
End: 2021-07-29
Payer: MEDICARE

## 2021-07-29 VITALS
BODY MASS INDEX: 22.36 KG/M2 | HEART RATE: 87 BPM | HEIGHT: 64 IN | TEMPERATURE: 97.5 F | RESPIRATION RATE: 16 BRPM | DIASTOLIC BLOOD PRESSURE: 76 MMHG | SYSTOLIC BLOOD PRESSURE: 132 MMHG | WEIGHT: 131 LBS | OXYGEN SATURATION: 95 %

## 2021-07-29 DIAGNOSIS — E78.2 MIXED HYPERLIPIDEMIA: ICD-10-CM

## 2021-07-29 DIAGNOSIS — G30.1 LATE ONSET ALZHEIMER'S DEMENTIA WITH BEHAVIORAL DISTURBANCE (HCC): Primary | ICD-10-CM

## 2021-07-29 DIAGNOSIS — Z51.81 ENCOUNTER FOR MEDICATION MONITORING: ICD-10-CM

## 2021-07-29 DIAGNOSIS — F02.818 LATE ONSET ALZHEIMER'S DEMENTIA WITH BEHAVIORAL DISTURBANCE (HCC): Primary | ICD-10-CM

## 2021-07-29 DIAGNOSIS — I25.10 CORONARY ARTERY DISEASE DUE TO LIPID RICH PLAQUE: ICD-10-CM

## 2021-07-29 DIAGNOSIS — I25.83 CORONARY ARTERY DISEASE DUE TO LIPID RICH PLAQUE: ICD-10-CM

## 2021-07-29 DIAGNOSIS — Z79.899 NEW MEDICATION ADDED: ICD-10-CM

## 2021-07-29 PROCEDURE — 1090F PRES/ABSN URINE INCON ASSESS: CPT | Performed by: INTERNAL MEDICINE

## 2021-07-29 PROCEDURE — 99214 OFFICE O/P EST MOD 30 MIN: CPT | Performed by: INTERNAL MEDICINE

## 2021-07-29 PROCEDURE — G8427 DOCREV CUR MEDS BY ELIG CLIN: HCPCS | Performed by: INTERNAL MEDICINE

## 2021-07-29 PROCEDURE — G8536 NO DOC ELDER MAL SCRN: HCPCS | Performed by: INTERNAL MEDICINE

## 2021-07-29 PROCEDURE — 1100F PTFALLS ASSESS-DOCD GE2>/YR: CPT | Performed by: INTERNAL MEDICINE

## 2021-07-29 PROCEDURE — G8432 DEP SCR NOT DOC, RNG: HCPCS | Performed by: INTERNAL MEDICINE

## 2021-07-29 PROCEDURE — 3288F FALL RISK ASSESSMENT DOCD: CPT | Performed by: INTERNAL MEDICINE

## 2021-07-29 PROCEDURE — G8399 PT W/DXA RESULTS DOCUMENT: HCPCS | Performed by: INTERNAL MEDICINE

## 2021-07-29 PROCEDURE — G8420 CALC BMI NORM PARAMETERS: HCPCS | Performed by: INTERNAL MEDICINE

## 2021-07-29 RX ORDER — DONEPEZIL HYDROCHLORIDE 5 MG/1
5 TABLET, FILM COATED ORAL
Qty: 30 TABLET | Refills: 0 | Status: SHIPPED | OUTPATIENT
Start: 2021-07-29 | End: 2021-08-06 | Stop reason: DRUGHIGH

## 2021-07-29 RX ORDER — ATORVASTATIN CALCIUM 40 MG/1
TABLET, FILM COATED ORAL
Qty: 90 TABLET | Refills: 2 | Status: SHIPPED | OUTPATIENT
Start: 2021-07-29 | End: 2022-03-14 | Stop reason: SDUPTHER

## 2021-07-29 NOTE — PATIENT INSTRUCTIONS
Helping A Person With Dementia: Care Instructions  Your Care Instructions     Dementia is a loss of mental skills that affects daily life. It is different from mild memory loss that occurs with aging. Dementia can cause problems with memory, thinking clearly, and planning. It is different for everyone. But it usually gets worse slowly. Some people who have dementia can function well for a long time. But at some point it may become hard for the person to care for himself or herself. It can be upsetting to learn that a loved one has this condition. You may be afraid and worried about what will happen. You may wonder how you will care for the person. There is no cure for dementia. But medicine may be able to slow memory loss and improve thinking for a while. Other medicines may help with sleep, depression, and behavior changes. Dementia is different for everyone. In some cases, people can function well for a long time. You can help your loved one by making his or her home life easier and safer. You also need to take care of yourself. Caregiving can be stressful. But support is available to help you and give you a break when you need it. The Alzheimer's Association offers good information and support. If you are caring for someone with dementia, you can help make life safer and more comfortable. You can also help your loved one make decisions about future care. You may also want to bring up legal and financial issues. These are hard but important conversations to have. Follow-up care is a key part of your loved one's treatment and safety. Be sure to make and go to all appointments, and call your doctor if your loved one is having problems. It's also a good idea to know your loved one's test results and keep a list of the medicines he or she takes. How can you care for your loved one at home? Taking care of the person  · If the person takes medicine for dementia, help him or her take it exactly as prescribed. Call the doctor if you notice any problems with the medicine. · Make a list of the person's medicines. Review it with all of his or her doctors. · Help the person eat a balanced diet. Serve plenty of whole grains, fruits, and vegetables every day. If the person is not hungry at mealtimes, give snacks at midmorning and in the afternoon. Offer drinks such as Boost, Ensure, or Sustacal if the person is losing weight. · Encourage exercise. Walking and other activities may slow the decline of mental ability. Help the person stay active mentally with reading, crossword puzzles, or other hobbies. · Talk openly with the doctor about any behavior changes. Many people who have dementia become easily upset or agitated or feel worried. There are many things that can cause this, such as medicine side effects, confusion, and pain. It may be helpful to:  ? Keep distractions to a minimum. It may also help to keep noise levels low and voices quiet. ? Develop simple daily routines for bathing, dressing, and other activities. And remind your loved one often about upcoming changes to the daily routine, such as trips or appointments. ? Ask what is upsetting him or her. Keep in mind that people who have dementia don't always know why they are upset. · Take steps to help if the person is sundowning. This is the restless behavior and trouble with sleeping that may occur in late afternoon and at night. Try not to let the person nap during the day. Offer a glass of warm milk or caffeine-free tea before bedtime. · Be patient. A task may take the person longer than it used to. · For as long as he or she is able, allow your loved one to make decisions about activities, food, clothing, and other choices. Let him or her be independent, even if tasks take more time or are not done perfectly. Tailor tasks to the person's abilities. For example, if cooking is no longer safe, ask for other help.  Your loved one can help set the table, or make simple dishes such as a salad. When the person needs help, offer it gently. Staying safe  · Make your home (or your loved one's home) safe. Tack down rugs, and put no-slip tape in the tub. Install handrails, and put safety switches on stoves and appliances. Keep rooms free of clutter. Make sure walkways around furniture are clear. Do not move furniture around, because the person may become confused. · Use locks on doors and cupboards. Lock up knives, scissors, medicines, cleaning supplies, and other dangerous things. · Do not let the person drive or cook if he or she can't do it safely. A person with dementia should not drive unless he or she is able to pass an on-road driving test. Your state 's license bureau can do a driving test if there is any question. · Get medical alert jewelry for the person so that you can be contacted if he or she wanders away. If possible, provide a safe place for wandering, such as an enclosed yard or garden. Taking care of yourself  · Ask your doctor about support groups and other resources in your area. · Take care of your health. Be sure to eat healthy foods and get enough rest and exercise. · Take time for yourself. Respite services provide someone to stay with the person for a short time while you get out of the house for a few hours. · Make time for an activity that you enjoy. Read, listen to music, paint, do crafts, or play an instrument, even if it's only for a few minutes a day. · Spend time with family, friends, and others in your support system. When should you call for help? Call 911 anytime you think the person may need emergency care. For example, call if:    · The person who has dementia wanders away and you can't find him or her.     · The person who has dementia is seriously injured.    Call the doctor now or seek immediate medical care if:    · The person suddenly sees things that are not there (hallucinates).     · The person has a sudden change in his or her behavior. Watch closely for changes in the person's health, and be sure to contact the doctor if:    · The person has symptoms that could cause injury.     · The person has problems with his or her medicine.     · You need more information to care for a person with dementia.     · You need respite care so you can take a break. Where can you learn more? Go to http://www.gray.com/  Enter B382 in the search box to learn more about \"Helping A Person With Dementia: Care Instructions. \"  Current as of: September 23, 2020               Content Version: 12.8  © 2006-2021 [employer]. Care instructions adapted under license by [employer] (which disclaims liability or warranty for this information). If you have questions about a medical condition or this instruction, always ask your healthcare professional. [address] any warranty or liability for your use of this information.

## 2021-07-29 NOTE — PROGRESS NOTES
Ping Landin is a 68 y.o. female and presents with Memory Loss      Subjective:  Comes in today after long overdue follow-up. She is in attendance with her granddaughter Oscar Rebollar. Patient and granddaughter concerned about declining memory of Ms Sharon Jorge office visit I reviewed her MRI which was unremarkable. Lab work was e. Last WNL. Had prescribed Aricept which she never took. She is also been having concerns panic attacks and anxiety and paranoid symptoms. Low-dose Seroquel was prescribed after an EKG done in office ([facility]). Patient never started meds. She had had a virtual visit with Dr. Mikie Walden on 11/13/2020 for evaluation of her cognitive impairment. MRI was negative for vascular component/small vessel ischemia as a cause of memory impairment. She never went for neuropsych testing. Coronary artery diseaseon aspirin and statin. Recently stopped taking statin never picked up the prescriptions. Has not had recent lab work. Recap-  Patient was also followed with hepatology [place] for concern of a liver mass. Recent LFTs, cancer serologies for AFP, CA 199 and CEA were all negative. CT-guided biopsy for liver mass performed on 11/2020 was negative for malignancy. No further treatment advised.   Past Medical History:   Diagnosis Date    Arthritis     bilateral hands, left knee - pt got injections    Carpal tunnel syndrome     bilateral hands    Dental infection ~1998    progressed to back of neck, pt had sx that consisted of taking bone from her right hip to replace in her spinal coloum; pt in halo for 3 months    Menopause      Past Surgical History:   Procedure Laterality Date    HX CARPAL TUNNEL RELEASE Right 11/01/2018    open    HX HYSTERECTOMY      HX OOPHORECTOMY      HX TONSILLECTOMY      HX UROLOGICAL  ~1998    Back surgery:  pt reports she had dental inf. that went to back of neck and ortho surgeon took pieces of bone from right hip to replace in spinal column; pt reports she wore halo for 3 months:  Dr. Benito Wilson (sx done at [facility])     Allergies   Allergen Reactions    Penicillins Shortness of Breath and Rash     Current Outpatient Medications   Medication Sig Dispense Refill    donepeziL (ARICEPT) 5 mg tablet Take 1 Tablet by mouth nightly for 30 days. Indications: mild to moderate Alzheimer's type dementia 30 Tablet 0    atorvastatin (LIPITOR) 40 mg tablet TAKE 1 TABLET BY MOUTH ONCE DAILY 90 Tablet 2    aspirin delayed-release 81 mg tablet Take 1 Tab by mouth daily. (Patient taking differently: Take 81 mg by mouth as needed.) 30 Tab 11    ibuprofen (ADVIL) 200 mg tablet Take 200 mg by mouth as needed for Pain. Social History     Socioeconomic History    Marital status:      Spouse name: Not on file    Number of children: Not on file    Years of education: Not on file    Highest education level: Not on file   Tobacco Use    Smoking status: Former Smoker     Packs/day: 1.00     Years: 20.00     Pack years: 20.00     Quit date:      Years since quittin.5    Smokeless tobacco: Never Used   Vaping Use    Vaping Use: Never used   Substance and Sexual Activity    Alcohol use: Yes     Alcohol/week: 3.0 standard drinks     Types: 3 Glasses of wine per week     Comment: weekly    Drug use: No    Sexual activity: Not Currently     Social Determinants of Health     Financial Resource Strain:     Difficulty of Paying Living Expenses:    Food Insecurity:     Worried About Running Out of Food in the Last Year:     Ran Out of Food in the Last Year:    Transportation Needs:     Lack of Transportation (Medical):      Lack of Transportation (Non-Medical):    Physical Activity:     Days of Exercise per Week:     Minutes of Exercise per Session:    Stress:     Feeling of Stress :    Social Connections:     Frequency of Communication with Friends and Family:     Frequency of Social Gatherings with Friends and Family:     Attends Oriental orthodox Services:     Active Member of Clubs or Organizations:     Attends Club or Organization Meetings:     Marital Status:      Family History   Problem Relation Age of Onset    Cancer Daughter     Parkinson's Disease Mother     Coronary Artery Disease Mother     Coronary Artery Disease Brother     High Cholesterol Brother        Review of Systems  ROS is unremarkable except for ones highlighted in bold.    Constitutional: negative for fevers, chills, anorexia and weight loss  Eyes:   negative for visual disturbance and irritation  ENT:   negative for tinnitus,sore throat,nasal congestion,ear pain,hoarseness  Respiratory:  negative for cough, hemoptysis, dyspnea,wheezing  CV:   negative for chest pain, palpitations, lower extremity edema  GI:   negative for nausea, vomiting, diarrhea, abdominal pain,melena  Endo:               negative for polyuria,polydipsia,polyphagia,heat intolerance  Genitourinary: negative for frequency, dysuria and hematuria  Integumentary: negative for rash and pruritus  Hematologic:  negative for easy bruising and gum/nose bleeding  Musculoskel: negative for myalgias, arthralgias, back pain, muscle weakness, joint pain  Neurological:  negative for headaches, dizziness, vertigo, memory problems and gait   Behavl/Psych: negative for feelings of anxiety, depression, mood changes  ROS otherwise negative     Objective:  Visit Vitals  /76 (BP 1 Location: Right upper arm, BP Patient Position: Sitting, BP Cuff Size: Adult)   Pulse 87   Temp 97.5 °F (36.4 °C)   Resp 16   Ht 5' 4\" (1.626 m)   Wt 131 lb (59.4 kg)   SpO2 95%   BMI 22.49 kg/m²     Physical Exam:   General appearance - alert, well appearing, and in no distress  Mental status - alert, oriented to person, place, and time  EYE-AMELIE, EOMI, fundi normal, corneas normal, no foreign bodies  ENT-ENT exam normal, no neck nodes or sinus tenderness  Nose - normal and patent, no erythema, discharge or polyps  Mouth - mucous membranes moist, pharynx normal without lesions  Neck - supple, no significant adenopathy   Chest - clear to auscultation, no wheezes, rales or rhonchi, symmetric air entry   Heart - normal rate, regular rhythm, normal S1, S2, no murmurs, rubs, clicks or gallops   Abdomen - soft, nontender, nondistended, no masses or organomegaly  Lymph- no adenopathy palpable  Ext-peripheral pulses normal, no pedal edema, no clubbing or cyanosis  Skin-Warm and dry. no hyperpigmentation, vitiligo, or suspicious lesions  Neuro -alert, oriented, normal speech, no focal findings or movement disorder noted  Musculoskeletal- FROM, no bony abnormalities, right hand point tenderness,    Lab Review:  Results for orders placed or performed in visit on 01/13/21   CBC W/O DIFF   Result Value Ref Range    WBC 6.5 4.1 - 10.9 K/uL    RBC 5.07 4.20 - 6.30 M/uL    HGB 15.3 12.0 - 18.0 g/dL    HCT 48.6 37.0 - 51.0 %    MCH 30.2 26.0 - 32.0 pg    MCHC 31.5 30.0 - 36.0 g/dL    MCV 95.9 80.0 - 97.0 fL    RDW 12.3 %    PLATELET 040.5 479.3 - 613.5 K/uL   METABOLIC PANEL, COMPREHENSIVE   Result Value Ref Range    Glucose 106 (H) 65 - 105 mg/dL    BUN 23.0 (H) 7.0 - 17.0 mg/dL    Creatinine 0.6 (L) 0.7 - 1.2 mg/dL    Sodium 143 137 - 145 mmol/L    Potassium 4.6 3.6 - 5.0 mmol/L    Chloride 103 98 - 107 mmol/L    CO2 32.0 22.0 - 32.0 mmol/L    Calcium 9.3 8.4 - 10.2 mg/dl    Protein, total 6.7 6.3 - 8.2 g/dL    Albumin 4.0 3.9 - 5.4 g/dL    ALT (SGPT) 17 0 - 35 U/L    AST (SGOT) 23.0 14.0 - 36.0 U/L    Alk. phosphatase 76 38 - 126 U/L    Bilirubin, total 0.5 0.2 - 1.3 mg/dL    BUN/Creatinine ratio 38 Ratio    GFR est AA >60 mL/min/1.73m2    GFR est non-AA >60 mL/min/1.73m2    Globulin 2.70     A-G Ratio 1.5 Ratio    Anion gap 8 mmol/L        Documenation Review:    Assessment/Plan:    Diagnoses and all orders for this visit:    1. Late onset Alzheimer's dementia with behavioral disturbance (HCC)  -     donepeziL (ARICEPT) 5 mg tablet;  Take 1 Tablet by mouth nightly for 30 days. Indications: mild to moderate Alzheimer's type dementia    2. Coronary artery disease due to lipid rich plaque  -     CBC W/O DIFF; Future  -     LIPID PANEL; Future  -     METABOLIC PANEL, COMPREHENSIVE; Future  -     atorvastatin (LIPITOR) 40 mg tablet; TAKE 1 TABLET BY MOUTH ONCE DAILY    3. Mixed hyperlipidemia    4. Encounter for medication monitoring  -     CBC W/O DIFF; Future  -     LIPID PANEL; Future  -     METABOLIC PANEL, COMPREHENSIVE; Future    5. New medication added  -     donepeziL (ARICEPT) 5 mg tablet; Take 1 Tablet by mouth nightly for 30 days. Indications: mild to moderate Alzheimer's type dementia        Start Aricept 5 mg for 30 days and uptitrate dose to 10 mg if tolerated. May look into starting Namenda following that. Follow-up with neurology  Restart your statin. I will call with lab results and make further recommendations or adjustments if necessary. Discussed lifestyle modifications including Na restriction, low carb/fat diet, weight reduction and exercise (at least a walking program). ICD-10-CM ICD-9-CM    1. Late onset Alzheimer's dementia with behavioral disturbance (HCC)  G30.1 331.0 donepeziL (ARICEPT) 5 mg tablet    F02.81 294.11    2. Coronary artery disease due to lipid rich plaque  I25.10 414.00 CBC W/O DIFF    I25.83 414.3 LIPID PANEL      METABOLIC PANEL, COMPREHENSIVE      atorvastatin (LIPITOR) 40 mg tablet   3. Mixed hyperlipidemia  E78.2 272.2    4. Encounter for medication monitoring  Z51.81 V58.83 CBC W/O DIFF      LIPID PANEL      METABOLIC PANEL, COMPREHENSIVE   5. New medication added  Z79.899 V58.69 donepeziL (ARICEPT) 5 mg tablet         Follow-up and Dispositions    · Return in about 2 months (around 9/29/2021) for follow up. I have reviewed with the patient details of the assessment and plan and all questions were answered. Relevent patient education was performed.  Verbal and/or written instructions (see AVS) provided. The most recent lab findings were reviewed with the patient. Plan was discussed with patient who verbally expressed understanding. An After Visit Summary was printed and given to the patient.     Ryne Campos MD

## 2021-07-29 NOTE — PROGRESS NOTES
Jhoana Guzman is a 68 y.o. female presenting for Memory Loss  . 1. Have you been to the ER, urgent care clinic since your last visit? Hospitalized since your last visit? Yes When: 5/2021 Where: urgent care [place] [id number] Reason for visit: cut hand    2. Have you seen or consulted any other health care providers outside of the [address] [place] [address] since your last visit? Include any pap smears or colon screening. No    Fall Risk Assessment, last 12 mths 1/13/2021   Able to walk? Yes   Fall in past 12 months? 1   Do you feel unsteady? 0   Are you worried about falling 0   Is TUG test greater than 12 seconds? 0   Is the gait abnormal? 0   Number of falls in past 12 months 1   Fall with injury? 0         Abuse Screening Questionnaire 1/13/2021   Do you ever feel afraid of your partner? N   Are you in a relationship with someone who physically or mentally threatens you? N   Is it safe for you to go home? Y       3 most recent PHQ Screens 1/13/2021   Little interest or pleasure in doing things Several days   Feeling down, depressed, irritable, or hopeless Several days   Total Score PHQ 2 2       There are no discontinued medications.

## 2021-07-30 LAB
ALBUMIN SERPL-MCNC: 4.3 G/DL (ref 3.5–5)
ALBUMIN/GLOB SERPL: 1.3 {RATIO} (ref 1.1–2.2)
ALP SERPL-CCNC: 86 U/L (ref 45–117)
ALT SERPL-CCNC: 29 U/L (ref 12–78)
ANION GAP SERPL CALC-SCNC: 6 MMOL/L (ref 5–15)
AST SERPL-CCNC: 20 U/L (ref 15–37)
BILIRUB SERPL-MCNC: 0.6 MG/DL (ref 0.2–1)
BUN SERPL-MCNC: 16 MG/DL (ref 6–20)
BUN/CREAT SERPL: 22 (ref 12–20)
CALCIUM SERPL-MCNC: 9.9 MG/DL (ref 8.5–10.1)
CHLORIDE SERPL-SCNC: 106 MMOL/L (ref 97–108)
CHOLEST SERPL-MCNC: 249 MG/DL
CO2 SERPL-SCNC: 28 MMOL/L (ref 21–32)
CREAT SERPL-MCNC: 0.73 MG/DL (ref 0.55–1.02)
ERYTHROCYTE [DISTWIDTH] IN BLOOD BY AUTOMATED COUNT: 12.7 % (ref 11.5–14.5)
GLOBULIN SER CALC-MCNC: 3.2 G/DL (ref 2–4)
GLUCOSE SERPL-MCNC: 96 MG/DL (ref 65–100)
HCT VFR BLD AUTO: 48.3 % (ref 35–47)
HDLC SERPL-MCNC: 88 MG/DL
HDLC SERPL: 2.8 {RATIO} (ref 0–5)
HGB BLD-MCNC: 15.4 G/DL (ref 11.5–16)
LDLC SERPL CALC-MCNC: 139.8 MG/DL (ref 0–100)
MCH RBC QN AUTO: 30.3 PG (ref 26–34)
MCHC RBC AUTO-ENTMCNC: 31.9 G/DL (ref 30–36.5)
MCV RBC AUTO: 94.9 FL (ref 80–99)
NRBC # BLD: 0 K/UL (ref 0–0.01)
NRBC BLD-RTO: 0 PER 100 WBC
PLATELET # BLD AUTO: 299 K/UL (ref 150–400)
PMV BLD AUTO: 10.2 FL (ref 8.9–12.9)
POTASSIUM SERPL-SCNC: 4.4 MMOL/L (ref 3.5–5.1)
PROT SERPL-MCNC: 7.5 G/DL (ref 6.4–8.2)
RBC # BLD AUTO: 5.09 M/UL (ref 3.8–5.2)
SODIUM SERPL-SCNC: 140 MMOL/L (ref 136–145)
TRIGL SERPL-MCNC: 106 MG/DL (ref ?–150)
VLDLC SERPL CALC-MCNC: 21.2 MG/DL
WBC # BLD AUTO: 7.4 K/UL (ref 3.6–11)

## 2021-08-06 ENCOUNTER — OFFICE VISIT (OUTPATIENT)
Dept: NEUROLOGY | Age: 81
End: 2021-08-06
Payer: MEDICARE

## 2021-08-06 VITALS
OXYGEN SATURATION: 98 % | HEIGHT: 64 IN | DIASTOLIC BLOOD PRESSURE: 88 MMHG | BODY MASS INDEX: 22.49 KG/M2 | RESPIRATION RATE: 16 BRPM | HEART RATE: 87 BPM | SYSTOLIC BLOOD PRESSURE: 160 MMHG

## 2021-08-06 DIAGNOSIS — G30.9 ALZHEIMER'S DISEASE, UNSPECIFIED (CODE) (HCC): ICD-10-CM

## 2021-08-06 DIAGNOSIS — R41.3 MEMORY CHANGE: Primary | ICD-10-CM

## 2021-08-06 PROCEDURE — 1090F PRES/ABSN URINE INCON ASSESS: CPT | Performed by: PSYCHIATRY & NEUROLOGY

## 2021-08-06 PROCEDURE — 1100F PTFALLS ASSESS-DOCD GE2>/YR: CPT | Performed by: PSYCHIATRY & NEUROLOGY

## 2021-08-06 PROCEDURE — 99214 OFFICE O/P EST MOD 30 MIN: CPT | Performed by: PSYCHIATRY & NEUROLOGY

## 2021-08-06 PROCEDURE — G8427 DOCREV CUR MEDS BY ELIG CLIN: HCPCS | Performed by: PSYCHIATRY & NEUROLOGY

## 2021-08-06 PROCEDURE — 3288F FALL RISK ASSESSMENT DOCD: CPT | Performed by: PSYCHIATRY & NEUROLOGY

## 2021-08-06 PROCEDURE — G8420 CALC BMI NORM PARAMETERS: HCPCS | Performed by: PSYCHIATRY & NEUROLOGY

## 2021-08-06 PROCEDURE — G8536 NO DOC ELDER MAL SCRN: HCPCS | Performed by: PSYCHIATRY & NEUROLOGY

## 2021-08-06 PROCEDURE — G8510 SCR DEP NEG, NO PLAN REQD: HCPCS | Performed by: PSYCHIATRY & NEUROLOGY

## 2021-08-06 PROCEDURE — G8399 PT W/DXA RESULTS DOCUMENT: HCPCS | Performed by: PSYCHIATRY & NEUROLOGY

## 2021-08-06 RX ORDER — DONEPEZIL HYDROCHLORIDE 10 MG/1
10 TABLET, FILM COATED ORAL
Qty: 30 TABLET | Refills: 0 | Status: SHIPPED | OUTPATIENT
Start: 2021-08-06 | End: 2021-09-02

## 2021-08-06 RX ORDER — QUETIAPINE FUMARATE 25 MG/1
12.5 TABLET, FILM COATED ORAL
Qty: 30 TABLET | Refills: 1 | Status: SHIPPED | OUTPATIENT
Start: 2021-08-06 | End: 2021-12-16 | Stop reason: SDUPTHER

## 2021-08-06 NOTE — PATIENT INSTRUCTIONS
A Healthy Lifestyle: Care Instructions  Your Care Instructions     A healthy lifestyle can help you feel good, stay at a healthy weight, and have plenty of energy for both work and play. A healthy lifestyle is something you can share with your whole family. A healthy lifestyle also can lower your risk for serious health problems, such as high blood pressure, heart disease, and diabetes. You can follow a few steps listed below to improve your health and the health of your family. Follow-up care is a key part of your treatment and safety. Be sure to make and go to all appointments, and call your doctor if you are having problems. It's also a good idea to know your test results and keep a list of the medicines you take. How can you care for yourself at home? · Do not eat too much sugar, fat, or fast foods. You can still have dessert and treats now and then. The goal is moderation. · Start small to improve your eating habits. Pay attention to portion sizes, drink less juice and soda pop, and eat more fruits and vegetables. ? Eat a healthy amount of food. A 3-ounce serving of meat, for example, is about the size of a deck of cards. Fill the rest of your plate with vegetables and whole grains. ? Limit the amount of soda and sports drinks you have every day. Drink more water when you are thirsty. ? Eat plenty of fruits and vegetables every day. Have an apple or some carrot sticks as an afternoon snack instead of a candy bar. Try to have fruits and/or vegetables at every meal.  · Make exercise part of your daily routine. You may want to start with simple activities, such as walking, bicycling, or slow swimming. Try to be active 30 to 60 minutes every day. You do not need to do all 30 to 60 minutes all at once. For example, you can exercise 3 times a day for 10 or 20 minutes.  Moderate exercise is safe for most people, but it is always a good idea to talk to your doctor before starting an exercise program.  · Keep moving. Leticia Navarrete the lawn, work in the garden, or hike. Take the stairs instead of the elevator at work. · If you smoke, quit. People who smoke have an increased risk for heart attack, stroke, cancer, and other lung illnesses. Quitting is hard, but there are ways to boost your chance of quitting tobacco for good. ? Use nicotine gum, patches, or lozenges. ? Ask your doctor about stop-smoking programs and medicines. ? Keep trying. In addition to reducing your risk of diseases in the future, you will notice some benefits soon after you stop using tobacco. If you have shortness of breath or asthma symptoms, they will likely get better within a few weeks after you quit. · Limit how much alcohol you drink. Moderate amounts of alcohol (up to 2 drinks a day for men, 1 drink a day for women) are okay. But drinking too much can lead to liver problems, high blood pressure, and other health problems. Family health  If you have a family, there are many things you can do together to improve your health. · Eat meals together as a family as often as possible. · Eat healthy foods. This includes fruits, vegetables, lean meats and dairy, and whole grains. · Include your family in your fitness plan. Most people think of activities such as jogging or tennis as the way to fitness, but there are many ways you and your family can be more active. Anything that makes you breathe hard and gets your heart pumping is exercise. Here are some tips:  ? Walk to do errands or to take your child to school or the bus.  ? Go for a family bike ride after dinner instead of watching TV. Where can you learn more? Go to http://www.gray.com/  Enter Z981 in the search box to learn more about \"A Healthy Lifestyle: Care Instructions. \"  Current as of: September 23, 2020               Content Version: 12.8  © 2357-5427 [employer].    Care instructions adapted under license by [employer] (which disclaims liability or warranty for this information). If you have questions about a medical condition or this instruction, always ask your healthcare professional. [address] any warranty or liability for your use of this information.

## 2021-08-06 NOTE — PROGRESS NOTES
Chief Complaint   Patient presents with   Oanh waite, had her on aricept and discussed seroquel, she also discussed namenda

## 2021-08-06 NOTE — PROGRESS NOTES
Neurology Note    Patient ID:  Julisa Hendricks  [id number]  13 y.o.  1940      Date of Consultation:  August 6, 2021    Subjective:       History of Present Illness:   Julisa Hendricks is a 68 y.o. female with a history of hyperlipidemia and arthritis who presents to neurology clinic for evaluation of cognitive impairment. Patient reports that this has been going on for several years however has been more noticeable in the last few months. She reports that her short-term memory has been mostly affected where she frequently forgets conversations or tasks. She has no trouble with her long-term memory. She has also forgotten her appointments and needs to be reminded. Her  and her granddaughter help her with these appointments. Otherwise she has not forgotten taking her medications however she does use a pillbox. Is still able to cook and clean and does not leave tasks unfinished. She has not had any issues with leaving the stove on. She spends her day with her  and they, walks together and she spends her time cleaning. Also likes to play [employer] on her iPad. She has not lost interest in any of her old hobbies however due to the coronavirus, she has picked up new hobbies and is enjoying them. She reports that she sleeps well and eats a fairly well-balanced diet. She does report smoking however she quit 5 years ago. Additionally she reports approximately 1 month ago she started taking prevention and feels like it may be improving her symptoms. Her  is the one who maintains finances. Of note patient does report that she has numbness in her bilateral hands. She says that this is been going on for years and underwent carpal tunnel release on the right. She does not have significant pain however has dropped things due to the numbness. She reports that it is all 5 of her fingers and it goes down into the palms of her hand.   She does not remember if she has had an EMG done prior to her surgery. 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 She does not know if it affected her brain and that is what is causing her issues with memory. She does remember that she was in a halo for several weeks and required extensive rehab. Interval hx:   Returns to clinic today and reports that she is doing well. She has no complaints and states that she is feeling well. She has no issues with sleep or her memory. Her granddaughter is also with her who does report that she has had some difficulty with her sleep as well as agitation and aggression. There was a plan to start Aricept and patient has been taking this in place of provision. She has not noted any side effects. Talked with her primary care doctor regarding Seroquel and would like to proceed with this as well.   Unfortunately she did not have neuropsychological testing however did have the MRI of the brain that showed some mild atrophy and white matter disease    Past Medical History:   Diagnosis Date    Arthritis     bilateral hands, left knee - pt got injections    Carpal tunnel syndrome     bilateral hands    Dental infection ~1998    progressed to back of neck, pt had sx that consisted of taking bone from her right hip to replace in her spinal coloum; pt in halo for 3 months    Menopause         Past Surgical History:   Procedure Laterality Date    HX CARPAL TUNNEL RELEASE Right 11/01/2018    open    HX HYSTERECTOMY      HX OOPHORECTOMY      HX TONSILLECTOMY      HX UROLOGICAL  ~1998    Back surgery:  pt reports she had dental inf. that went to back of neck and ortho surgeon took pieces of bone from right hip to replace in spinal column; pt reports she wore halo for 3 months:  Dr. Benito Wilson (sx done at [facility])        Family History   Problem Relation Age of Onset    Cancer Daughter    Karo Tam Parkinson's Disease Mother     Coronary Artery Disease Mother     Coronary Artery Disease Brother     High Cholesterol Brother         Social History     Tobacco Use    Smoking status: Former Smoker     Packs/day: 1.00     Years: 20.00     Pack years: 20.00     Quit date:      Years since quittin.6    Smokeless tobacco: Never Used   Substance Use Topics    Alcohol use: Yes     Alcohol/week: 3.0 standard drinks     Types: 3 Glasses of wine per week     Comment: weekly        Allergies   Allergen Reactions    Penicillins Shortness of Breath and Rash        Prior to Admission medications    Medication Sig Start Date End Date Taking? Authorizing Provider   donepeziL (ARICEPT) 5 mg tablet Take 1 Tablet by mouth nightly for 30 days. Indications: mild to moderate Alzheimer's type dementia 21 Yes Donn Cordova MD   atorvastatin (LIPITOR) 40 mg tablet TAKE 1 TABLET BY MOUTH ONCE DAILY 21  Yes Donn Cordova MD   aspirin delayed-release 81 mg tablet Take 1 Tab by mouth daily. Patient taking differently: Take 81 mg by mouth as needed. 20  Yes Joe DE ANDA, JANELL   ibuprofen (ADVIL) 200 mg tablet Take 200 mg by mouth as needed for Pain.    Yes Provider, Historical       Review of Systems:    General, constitutional: negative  Eyes, vision: negative  Ears, nose, throat: negative  Cardiovascular, heart: negative  Respiratory: negative  Gastrointestinal: negative  Genitourinary: negative  Musculoskeletal: negative  Skin and integumentary: negative  Psychiatric: negative  Endocrine: negative  Neurological: negative, except for HPI  Hematologic/lymphatic: negative  Allergy/immunology: negative    Objective:     Visit Vitals  BP (!) 160/88 (BP 1 Location: Left arm, BP Patient Position: Sitting, BP Cuff Size: Adult)   Pulse 87   Resp 16   Ht 5' 4\" (1.626 m)   SpO2 98%   BMI 22.49 kg/m²       Physical Exam:  General:  no acute distress  Neck: no carotid bruits  Lungs: clear to auscultation  Heart:  no murmurs, regular rate and rhythm   Lower extremity: no edema    Neurological exam:  Mental Status: Awake, alert, oriented to person, place and time  Attention and Concentration: able to state the days of the week backwards   Speech and Language: No dysarthria. Able to name, repeat and follow commands   Fund of knowledge was preserved    Cranial nerves: II-XII  Pupils equal and reactive, visual fields intact by confrontation   Extraocular movements intact, no evidence of nystagmus or ptosis   Facial sensation intact   Facial movements symmetric   Hearing intact to soft rub bilaterally   Shoulder shrug symmetric and strong   Tongue protrusion full and midline without fasciculation or atrophy    Motor:   Normal tone and Bulk   Drift: No evidence of pronator drift     Strength testing:   deltoid triceps biceps Wrist ext. Wrist flex. intrinsics   Right 5 5 5 5 5 5   Left 5 5 5 5 5 5      Hip flex. Hip ext. Knee ext. Knee flex Dorsi flex Plantar flex   Right  5 5 5 5 5 5   Left  5 5 5 5 5 5       Sensory:  Intact to light touch and pinprick, no extinction     Reflexes:     Biceps Triceps  Brachiorad Patellar Achilles Plantar Hoffmans   Right  2 2 2 2 2 Down Neg   Left  2 2 2 2 2 Down Neg          Cerebellar testing:  No dysmetria. Normal rapid alternating movements; finger-to-nose and heel-to- shin testing are within normal limits.     Romberg: absent    Gait: steady        Labs:     Lab Results   Component Value Date/Time    Hemoglobin A1c 5.8 (H) 09/30/2020 03:11 PM    Sodium 140 07/29/2021 01:47 PM    Potassium 4.4 07/29/2021 01:47 PM    Chloride 106 07/29/2021 01:47 PM    Glucose 96 07/29/2021 01:47 PM    BUN 16 07/29/2021 01:47 PM    Creatinine 0.73 07/29/2021 01:47 PM    Calcium 9.9 07/29/2021 01:47 PM    WBC 7.4 07/29/2021 01:47 PM    HCT 48.3 (H) 07/29/2021 01:47 PM    HGB 15.4 07/29/2021 01:47 PM    PLATELET 904 48/60/4130 01:47 PM       Imaging:    Results from [facility] Encounter encounter on 12/17/20    MRI BRAIN WO CONT    Narrative  BRAIN MRI WITHOUT CONTRAST: 12/17/2020 10:31 AM    INDICATION: Cognitive impairment, amnesia, memory change. COMPARISON: None. TECHNIQUE: Images were obtained in multiple planes and sequences to emphasize  T1, T2, and T2* information. Diffusion-weighted images and ADC maps were also  obtained. FINDINGS: The ventricles and sulci are appropriate for age. The main  intracranial flow-voids are normal. A few, scattered, periventricular and  subcortical white matter signal abnormalities are consistent with minimal  chronic small vessel ischemic disease. This is within the range of normal for  age. No restricted diffusion to suggest acute infarction. No hemorrhage. Impression  IMPRESSION: No acute intracranial abnormality. Results from [place] encounter on 10/23/20    CT ABD W WO CONT    Narrative  EXAM: CT ABD W WO CONT    INDICATION: Evaluate liver abnormality. COMPARISON: CT thorax 10/16/2020. CONTRAST: 100 mL of Isovue-370. TECHNIQUE:  Multislice helical CT was performed from the diaphragm to the iliac crest prior  to and during rapid bolus intravenous contrast administration. Precontrast,  hepatic arterial, portal venous, and equilibrium phase imaging obtained. Oral  contrast was not administered. Contiguous 5 mm axial images were reconstructed  and lung and soft tissue windows were generated. Coronal and sagittal  reformations were generated. CT dose reduction was achieved through use of a  standardized protocol tailored for this examination and automatic exposure  control for dose modulation.     FINDINGS:    LOWER THORAX: No significant abnormality in the incidentally imaged lower chest.    LIVER: Low-density lobulated mass with associated central coarse calcification  and peripheral transient arterial phase enhancement is seen in the right lobe  liver with craniocaudal extent up to 5.6 cm and maximal transaxial dimensions of  3.9 x 4.3 cm. There is some associated overlying capsular retraction. A small  focus of arterial phase enhancement with peripheral blush and persistent  enhancement on delayed images is seen in subcapsular segment 4A measuring  approximately 11 mm. No other focal hepatic lesions are seen. Hepatic vascular  structures opacify normally otherwise. BILIARY TREE: Gallbladder is within normal limits. CBD is not dilated. SPLEEN: within normal limits. PANCREAS: No mass or ductal dilatation. ADRENALS: Unremarkable. KIDNEYS: No mass, calculus, or hydronephrosis. STOMACH: Unremarkable. VISUALIZED BOWEL: No dilatation or wall thickening. PERITONEUM: No ascites or pneumoperitoneum. RETROPERITONEUM: No lymphadenopathy or aortic aneurysm. BONES: No destructive bone lesion. ABDOMINAL WALL: No mass or hernia. ADDITIONAL COMMENTS: N/A    Impression  IMPRESSION: Indeterminate coarse calcification containing hypodense lesion in  right lobe liver has features which could be compatible with epithelioid  hemangioendothelioma or other neoplasm such as metastatic mucinous carcinoma. A  second lesion in the anterior segment 2 could reflect flash fill hemangioma or  very small lesion of same etiology. 23X    Assessment and plan   La Resendiz is a 68 y.o. female with a history of hyperlipidemia and arthritis who presents to neurology clinic for evaluation of cognitive impairment, her neurological exam does reveal that she has delayed recall otherwise seems unremarkable. MRI of the brain only revealed mild atrophy and mild white matter changes therefore her symptoms are likely due to an Alzheimer's type dementia. - Patient is not agreeable to having neuropsych testing done and I stated that it would unlikely  at this time therefore we can hold off on the  - Discussed treatment options in detail including risks/benefits and expectations.   While medication is not likely to made a \"night and day\" difference, it may aid modestly in improving concentration and attention. Medication titration and addition of adjunctive agents may be necessary to achieve maximum benefit. Explained that AD is a progressive disease process. - Recommended that she continue with Aricept however increase the dose to 10 mg at bedtime. We again discussed the side effects of this medication.  - Given her agitation, I did state that in trial a low-dose of Seroquel at bedtime to see if this helps her. She has had an EKG done with her QTC being less than 450. We will see if this helps her symptoms  - Discussed that she would benefit from designation of a POA to assist with executive decision making. Finances and medication administration should be monitored to ensure accuracy and prevent errors. - Patient currently has appropriate social/caregiver support in place. Advanced care planning has been reviewed with the patient and family.    - Extensively discussed the importance of diet and exercise and how this has been shown to delay the progression of Alzheimer's and other forms of neurodegenerative diseases. We did discuss a Mediterranean diet.   - Encouraged to limit screen time and spend more time reading or doing puzzles such as crosswords or soduku     Patient Active Problem List   Diagnosis Code    Liver mass R16.0    Cognitive impairment R41.89                   Signed By:  Morales Boss MD     August 6, 2021

## 2021-08-08 ENCOUNTER — HOSPITAL ENCOUNTER (EMERGENCY)
Age: 81
Discharge: HOME | End: 2021-08-08
Attending: EMERGENCY MEDICINE
Payer: MEDICARE

## 2021-08-08 VITALS
SYSTOLIC BLOOD PRESSURE: 143 MMHG | DIASTOLIC BLOOD PRESSURE: 83 MMHG | OXYGEN SATURATION: 95 % | RESPIRATION RATE: 18 BRPM | HEART RATE: 92 BPM | TEMPERATURE: 98.2 F

## 2021-08-08 DIAGNOSIS — R44.3 HALLUCINATIONS: Primary | ICD-10-CM

## 2021-08-08 LAB
ALBUMIN SERPL-MCNC: 3.8 G/DL (ref 3.5–5)
ALBUMIN/GLOB SERPL: 1.2 {RATIO} (ref 1.1–2.2)
ALP SERPL-CCNC: 83 U/L (ref 45–117)
ALT SERPL-CCNC: 33 U/L (ref 12–78)
AMPHET UR QL SCN: NEGATIVE
ANION GAP SERPL CALC-SCNC: ABNORMAL MMOL/L (ref 5–15)
APAP SERPL-MCNC: <2 UG/ML (ref 10–30)
APPEARANCE UR: CLEAR
AST SERPL-CCNC: 20 U/L (ref 15–37)
BACTERIA URNS QL MICRO: ABNORMAL /HPF
BARBITURATES UR QL SCN: NEGATIVE
BASOPHILS # BLD: 0 K/UL (ref 0–0.1)
BASOPHILS NFR BLD: 0 % (ref 0–1)
BENZODIAZ UR QL: NEGATIVE
BILIRUB SERPL-MCNC: 0.6 MG/DL (ref 0.2–1)
BILIRUB UR QL: NEGATIVE
BUN SERPL-MCNC: 15 MG/DL (ref 6–20)
BUN/CREAT SERPL: 22 (ref 12–20)
CALCIUM SERPL-MCNC: 9 MG/DL (ref 8.5–10.1)
CANNABINOIDS UR QL SCN: NEGATIVE
CHLORIDE SERPL-SCNC: 110 MMOL/L (ref 97–108)
CO2 SERPL-SCNC: 30 MMOL/L (ref 21–32)
COCAINE UR QL SCN: NEGATIVE
COLOR UR: ABNORMAL
CREAT SERPL-MCNC: 0.68 MG/DL (ref 0.55–1.02)
DIFFERENTIAL METHOD BLD: NORMAL
DRUG SCRN COMMENT,DRGCM: NORMAL
EOSINOPHIL # BLD: 0.1 K/UL (ref 0–0.4)
EOSINOPHIL NFR BLD: 1 % (ref 0–7)
EPITH CASTS URNS QL MICRO: ABNORMAL /LPF
ERYTHROCYTE [DISTWIDTH] IN BLOOD BY AUTOMATED COUNT: 12.6 % (ref 11.5–14.5)
ETHANOL SERPL-MCNC: <10 MG/DL
GLOBULIN SER CALC-MCNC: 3.2 G/DL (ref 2–4)
GLUCOSE SERPL-MCNC: 93 MG/DL (ref 65–100)
GLUCOSE UR STRIP.AUTO-MCNC: NEGATIVE MG/DL
HCT VFR BLD AUTO: 45.8 % (ref 35–47)
HGB BLD-MCNC: 15.4 G/DL (ref 11.5–16)
HGB UR QL STRIP: ABNORMAL
HYALINE CASTS URNS QL MICRO: ABNORMAL /LPF (ref 0–5)
IMM GRANULOCYTES # BLD AUTO: 0 K/UL (ref 0–0.04)
IMM GRANULOCYTES NFR BLD AUTO: 0 % (ref 0–0.5)
KETONES UR QL STRIP.AUTO: NEGATIVE MG/DL
LEUKOCYTE ESTERASE UR QL STRIP.AUTO: ABNORMAL
LYMPHOCYTES # BLD: 2.6 K/UL (ref 0.8–3.5)
LYMPHOCYTES NFR BLD: 35 % (ref 12–49)
MCH RBC QN AUTO: 30.5 PG (ref 26–34)
MCHC RBC AUTO-ENTMCNC: 33.6 G/DL (ref 30–36.5)
MCV RBC AUTO: 90.7 FL (ref 80–99)
METHADONE UR QL: NEGATIVE
MONOCYTES # BLD: 0.7 K/UL (ref 0–1)
MONOCYTES NFR BLD: 9 % (ref 5–13)
NEUTS SEG # BLD: 4.1 K/UL (ref 1.8–8)
NEUTS SEG NFR BLD: 55 % (ref 32–75)
NITRITE UR QL STRIP.AUTO: POSITIVE
NRBC # BLD: 0 K/UL (ref 0–0.01)
NRBC BLD-RTO: 0 PER 100 WBC
OPIATES UR QL: NEGATIVE
PCP UR QL: NEGATIVE
PH UR STRIP: 5.5 [PH] (ref 5–8)
PLATELET # BLD AUTO: 270 K/UL (ref 150–400)
PMV BLD AUTO: 9.2 FL (ref 8.9–12.9)
POTASSIUM SERPL-SCNC: 4.1 MMOL/L (ref 3.5–5.1)
PROT SERPL-MCNC: 7 G/DL (ref 6.4–8.2)
PROT UR STRIP-MCNC: NEGATIVE MG/DL
RBC # BLD AUTO: 5.05 M/UL (ref 3.8–5.2)
RBC #/AREA URNS HPF: ABNORMAL /HPF (ref 0–5)
SALICYLATES SERPL-MCNC: <1.7 MG/DL (ref 2.8–20)
SODIUM SERPL-SCNC: 139 MMOL/L (ref 136–145)
SP GR UR REFRACTOMETRY: 1.01 (ref 1–1.03)
UR CULT HOLD, URHOLD: NORMAL
UROBILINOGEN UR QL STRIP.AUTO: 0.2 EU/DL (ref 0.2–1)
WBC # BLD AUTO: 7.6 K/UL (ref 3.6–11)
WBC URNS QL MICRO: ABNORMAL /HPF (ref 0–4)

## 2021-08-08 PROCEDURE — 85025 COMPLETE CBC W/AUTO DIFF WBC: CPT

## 2021-08-08 PROCEDURE — 80179 DRUG ASSAY SALICYLATE: CPT

## 2021-08-08 PROCEDURE — 82077 ASSAY SPEC XCP UR&BREATH IA: CPT

## 2021-08-08 PROCEDURE — 90791 PSYCH DIAGNOSTIC EVALUATION: CPT

## 2021-08-08 PROCEDURE — 80307 DRUG TEST PRSMV CHEM ANLYZR: CPT

## 2021-08-08 PROCEDURE — 80053 COMPREHEN METABOLIC PANEL: CPT

## 2021-08-08 PROCEDURE — 99284 EMERGENCY DEPT VISIT MOD MDM: CPT

## 2021-08-08 PROCEDURE — 36415 COLL VENOUS BLD VENIPUNCTURE: CPT

## 2021-08-08 PROCEDURE — 80143 DRUG ASSAY ACETAMINOPHEN: CPT

## 2021-08-08 PROCEDURE — 87077 CULTURE AEROBIC IDENTIFY: CPT

## 2021-08-08 PROCEDURE — 87186 SC STD MICRODIL/AGAR DIL: CPT

## 2021-08-08 PROCEDURE — 87086 URINE CULTURE/COLONY COUNT: CPT

## 2021-08-08 PROCEDURE — 81001 URINALYSIS AUTO W/SCOPE: CPT

## 2021-08-08 NOTE — ED TRIAGE NOTES
TRIAGE NOTE:  Patient arrives ambulatory accompanied by .  reports that patient is seeing 2 or 3 of him and seeing people in the house.  reports she has called 911 on a few occassions.  reports that he was instructed to bring patient to ER to get her medications straight.

## 2021-08-08 NOTE — BSMART NOTE
Comprehensive Assessment Form Part 1      Section I - Disposition    Axis I - Neurocognitive Disorder, Alzheimers, Mild with Behavioral Disturbance  Axis II - deferred  Axis III -   Past Medical History:   Diagnosis Date    Arthritis     bilateral hands, left knee - pt got injections    Carpal tunnel syndrome     bilateral hands    Dental infection ~1998    progressed to back of neck, pt had sx that consisted of taking bone from her right hip to replace in her spinal coloum; pt in halo for 3 months    Menopause        The Medical Doctor to Psychiatrist conference was not completed. The Medical Doctor is in agreement with Psychiatrist disposition because of (reason) pt/ not desiring inpatient admission. The plan is discharge with referrals and Crisis contact. The on-call Psychiatrist consulted was Dr. Paulina Weems. The admitting Psychiatrist will be Dr. Paulina Weems. The admitting Diagnosis is N/A. The Payor source is Medicare A & B. Section II - Integrated Summary  Summary:  Per triage, \"Patient arrives ambulatory accompanied by .  reports that patient is seeing 2 or 3 of him and seeing people in the house.  reports she has called 911 on a few occassions.  reports that he was instructed to bring patient to ER to get her medications straight. \"    Pt is a 68year old female presenting to ED accompanied by her , Santi Elise for hallucinations and calling 911. Pt presented as euthymic and cooperative. Pt was alert and oriented to self and place but not time and she stated that was because she is now retired.  shared right away that pt was much better since coming to the ER. Pt denied SI/HI but  shared she had said when arguing with family that she would harm herself and her grand-daughter was telling pt's  via text for him to tell us pt making threats to harm herself in past. Pt stated she would never harm herself or anyone else.  Pt shared she was here because of the halo she had to wear for dental purposes leaving a scar on her brain along with hallucinations and paranoia. Pt perseverated on this idea of brain scaring even after topic of Alzheimers was brought up. Pt shared she/ were told 8/6/2021 of her Alzheimers dx when she was placed on Seroquel and Aricept 10mg. Pt shared she took this medication Friday but refused to take it last night as she and her  had argument over the medications. Pt did share she has been seeing people and several people that looked like her  in her home but she stated she recognizes it is not real. Pt denied any appetite and/or sleep issues to date. Pt denied SA. It should be noted that from chart pt shows mild cognitive impairment documented by Dr. Hector Ying 9/2020. In 10/2020 pt's grand-daughter began calling physician reporting pt's symptoms worsening and she was referred Dr. Arvil Simmonds with neurology. 11/2020, Dr. Arvil Simmonds was notified again by grand-daughter that pt becoming more paranoid and anxious. Pt advised by Dr. Arvil Simmonds advised family to establish POA/legal aspects with advanced planning. 12/2020 grand-daughter called Dr. Arvil Simmonds again reporting pt began making calls to police, screaming and scared and he recommended neuro psych evaluation. 1/2021 pt met with Dr. Hector Ying about panic attacks and frequent confusion and was dx with Early onset Alzherimer's with behavior disturbance. It was at this time she was going to go see a neuro psych for evaluation the next day. However, grand-daughter called Dr. Madeline Cheng office back reporting that pt now refused because she found out pt calling her doctor. Grand-daughter now inquiring about Namenda, Aricept and Seroquel. On 1/25/2021 pt prescribed Aricept and Seroquel but she refused to take until she was seen again 8/6/2021 in which she has now only taken one night since prescribed. Pt denied any hx of psychiatric diagnosis and she did not wish to be admitted to inpatient BHU.  Pt stated she prefers to start back home taking her medications as prescribed, learning about her dx's and continue enjoying things she likes to do such as being with friends and floating in her pool. Writer advised  that he should have supervision for pt regarding pool since she does not know how to swim and only floats.  educated on dementia dx and TDO process. Pt and  both prefer to first attempt to establish outpatient services with psychiatric provider. Dr. Juan Carlos Santamaria agreed pt and  present stable at this time and  stated he would like to return home with pt and that he feels safe in doing so. Pt and  provided with Alzheimer's Association information, [facility] contact and x3 psychiatric providers (Koffi Villegas, [facility],  Cal Marrero). The patienthas not demonstrated mental capacity to provide informed consent. The information is given by the patient, spouse/SO and past medical records. The Chief Complaint are visual hallucinations and calling 911. The Precipitant Factors are Alzheimer's dx. Previous Hospitalizations: no   The patient has not previously been in restraints. Current Psychiatrist and/or  is no one. Lethality Assessment:    The potential for suicide is not noted. The potential for homicide is not noted. The patient has not been a perpetrator of sexual or physical abuse. There are not pending charges. The patient is not felt to be at risk for self harm or harm to others. The attending nurse was advised that security has not been notified. Section III - Psychosocial  The patient's overall mood and attitude is euthymic. Feelings of helplessness and hopelessness are not observed. Generalized anxiety is not observed. Panic is not observed. Phobias are not observed. Obsessive compulsive tendencies are not observed.       Section IV - Mental Status Exam  The patient's appearance shows no evidence of impairment. The patient's behavior shows no evidence of impairment. The patient is oriented to place, person and situation. The patient's speech is pressured. The patient's mood is euthymic. The range of affect shows no evidence of impairment. The patient's thought content demonstrates no evidence of impairment. The thought process perseveration. The patient's perception demonstrated changes in the following:  visual hallucinations. The patient's memory is impaired. The patient's appetite shows no evidence of impairment. The patient's sleep shows no evidence of impairment. The patient shows little to no insight. The patient's judgement is cognitively impaired. Section V - Substance Abuse  The patient is not using substances. Section VI - Living Arrangements  The patient is . The patient lives with a spouse. The patient has adult children. The patient does plan to return home upon discharge. The patient does not have legal issues pending. The patient's source of income comes from social security. Gnosticist and cultural practices have not been voiced at this time. The patient's greatest support comes from /Doc and this person will not be involved with the treatment. The patient has not been in an event described as horrible or outside the realm of ordinary life experience either currently or in the past.  The patient has not been a victim of sexual/physical abuse. Section VII - Other Areas of Clinical Concern  The highest grade achieved is HS diploma with the overall quality of school experience being described as unknown. The patient is currently retuired and speaks [place] as a primary language. The patient has no communication impairments affecting communication. The patient's preference for learning can be described as: can read and write adequately.   The patient's hearing is normal.  The patient's vision is normal.      Michael Reed MS, Resident in [facility]

## 2021-08-08 NOTE — DISCHARGE INSTRUCTIONS
You will need to follow-up with the psychiatrist as you have discussed with the counselor. Continue any regular medications. Return if any worsening of symptoms.

## 2021-08-08 NOTE — ED PROVIDER NOTES
This is a 24-year-old female with a history of arthritis. She has no history of hypertension or diabetes and is currently on no significant medications. She is brought in by the  because she was seeing double and seeing people that were not there. The  states that she has called 911 on occasion because of these people who were not there. She has had no fever or chill, cough or congestion and no shortness of breath. There is been no nausea or vomiting and no other GI or  symptoms noted. Patient states that she has had no urinary symptoms. The  says he was told to bring her to the hospital for medication adjustment. She is not currently followed by any psychiatric practitioner. Patient and her  provide any additional areas of concern. She does not express suicidal or homicidal ideation.            Past Medical History:   Diagnosis Date    Arthritis     bilateral hands, left knee - pt got injections    Carpal tunnel syndrome     bilateral hands    Dental infection ~1998    progressed to back of neck, pt had sx that consisted of taking bone from her right hip to replace in her spinal coloum; pt in halo for 3 months    Menopause        Past Surgical History:   Procedure Laterality Date    HX CARPAL TUNNEL RELEASE Right 11/01/2018    open    HX HYSTERECTOMY      HX OOPHORECTOMY      HX TONSILLECTOMY      HX UROLOGICAL  ~1998    Back surgery:  pt reports she had dental inf. that went to back of neck and ortho surgeon took pieces of bone from right hip to replace in spinal column; pt reports she wore halo for 3 months:  Dr. Joann Serna (sx [facility] at [facility])         Family History:   Problem Relation Age of Onset    Cancer Daughter     Parkinson's Disease Mother     Coronary Artery Disease Mother     Coronary Artery Disease Brother     High Cholesterol Brother        Social History     Socioeconomic History    Marital status:      Spouse name: Not on file    Number of children: Not on file    Years of education: Not on file    Highest education level: Not on file   Occupational History    Not on file   Tobacco Use    Smoking status: Former Smoker     Packs/day: 1.00     Years: 20.00     Pack years: 20.00     Quit date:      Years since quittin.6    Smokeless tobacco: Never Used   Vaping Use    Vaping Use: Never used   Substance and Sexual Activity    Alcohol use: Yes     Alcohol/week: 3.0 standard drinks     Types: 3 Glasses of wine per week     Comment: weekly    Drug use: No    Sexual activity: Not Currently   Other Topics Concern    Not on file   Social History Narrative    Not on file     Social Determinants of Health     Financial Resource Strain:     Difficulty of Paying Living Expenses:    Food Insecurity:     Worried About Running Out of Food in the Last Year:     Ran Out of Food in the Last Year:    Transportation Needs:     Lack of Transportation (Medical):  Lack of Transportation (Non-Medical):    Physical Activity:     Days of Exercise per Week:     Minutes of Exercise per Session:    Stress:     Feeling of Stress :    Social Connections:     Frequency of Communication with Friends and Family:     Frequency of Social Gatherings with Friends and Family:     Attends [employer] Services:     Active Member of Clubs or Organizations:     Attends Club or Organization Meetings:     Marital Status:    Intimate Partner Violence:     Fear of Current or Ex-Partner:     Emotionally Abused:     Physically Abused:     Sexually Abused: ALLERGIES: Penicillins    Review of Systems   Constitutional: Negative for activity change, appetite change and fatigue. HENT: Negative for ear pain, facial swelling, sore throat and trouble swallowing. Eyes: Negative for pain, discharge and visual disturbance. Respiratory: Negative for chest tightness, shortness of breath and wheezing. Cardiovascular: Negative for chest pain and palpitations. Gastrointestinal: Negative for abdominal pain, blood in stool, nausea and vomiting. Genitourinary: Negative for difficulty urinating, flank pain and hematuria. Musculoskeletal: Negative for arthralgias, joint swelling, myalgias and neck pain. Skin: Negative for color change and rash. Neurological: Negative for dizziness, weakness, numbness and headaches. Hematological: Negative for adenopathy. Does not bruise/bleed easily. Psychiatric/Behavioral: Positive for hallucinations. Negative for behavioral problems, confusion and sleep disturbance. All other systems reviewed and are negative. Vitals:    08/08/21 1514 08/08/21 1746 08/08/21 1800   BP: (!) 147/79 (!) 154/90 (!) 143/83   Pulse: 92     Resp: 18     Temp: 98.2 °F (36.8 °C)     SpO2: 98% 93% 95%            Physical Exam  Vitals and nursing note reviewed. Constitutional:       General: She is not in acute distress. Appearance: She is well-developed. HENT:      Head: Normocephalic and atraumatic. Nose: Nose normal.   Eyes:      General: No scleral icterus. Conjunctiva/sclera: Conjunctivae normal.      Pupils: Pupils are equal, round, and reactive to light. Neck:      Thyroid: No thyromegaly. Vascular: No JVD. Trachea: No tracheal deviation. Comments: No carotid bruits noted. Cardiovascular:      Rate and Rhythm: Normal rate and regular rhythm. Heart sounds: Normal heart sounds. No murmur heard. No friction rub. No gallop. Pulmonary:      Effort: Pulmonary effort is normal. No respiratory distress. Breath sounds: Normal breath sounds. No wheezing or rales. Chest:      Chest wall: No tenderness. Abdominal:      General: Bowel sounds are normal. There is no distension. Palpations: Abdomen is soft. There is no mass. Tenderness: There is no abdominal tenderness. There is no guarding or rebound. Musculoskeletal:         General: No tenderness. Normal range of motion.       Cervical back: Normal range of motion and neck supple. Lymphadenopathy:      Cervical: No cervical adenopathy. Skin:     General: Skin is warm and dry. Findings: No erythema or rash. Neurological:      Mental Status: She is alert and oriented to person, place, and time. Cranial Nerves: No cranial nerve deficit. Coordination: Coordination normal.      Deep Tendon Reflexes: Reflexes are normal and symmetric. Psychiatric:         Behavior: Behavior normal.         Thought Content: Thought content normal.         Judgment: Judgment normal.          MDM  Number of Diagnoses or Management Options  Hallucinations: new and requires workup     Amount and/or Complexity of Data Reviewed  Clinical lab tests: ordered and reviewed  Decide to obtain previous medical records or to obtain history from someone other than the patient: yes  Review and summarize past medical records: yes  Discuss the patient with other providers: yes    Risk of Complications, Morbidity, and/or Mortality  Presenting problems: high  Diagnostic procedures: high  Management options: high    Patient Progress  Patient progress: stable    ED Course as of Aug 08 1855   Sun Aug 08, 2021   1659 URINALYSIS W/MICROSCOPIC [RP]      ED Course User Index  [RP] Brittani Conklin MD       Procedures      6:55 PM All labs back but urine. Patient is stable and is ready for discharge as soon as the urine is obtained. All lab work is normal.  No other acute abnormalities are noted. Patient will follow up with outpatient psychiatry as was discussed with the  and the counselor. The patient was in no acute distress in the ED. She has been seen by the [facility] counselor and provided resources. The  and the patient would like to manage this as outpatient following up with psychiatry as the outpatient. Urinalysis returned after the patient had left. Appears she may have a urinary tract infection and will call in some medication for her.   We will have her follow-up with her own physician in 3 to 4 days if no improvement and in 7 to 10 days to be sure her urine is clear.

## 2021-08-09 DIAGNOSIS — N30.01 ACUTE CYSTITIS WITH HEMATURIA: Primary | ICD-10-CM

## 2021-08-09 RX ORDER — NITROFURANTOIN 25; 75 MG/1; MG/1
100 CAPSULE ORAL 2 TIMES DAILY
Qty: 14 CAPSULE | Refills: 0 | Status: SHIPPED | OUTPATIENT
Start: 2021-08-09 | End: 2021-08-16

## 2021-08-09 NOTE — SENIOR SERVICES NOTE
SSED Note. In collaboration with Dr. Ely Chávez. Patient with positive urine results for UTI. Writer called patient, Beryl Spears [phone], message left to return call to writer to inquire about allergies and to send RX to pharmacy. [address] called patients spouse, Louise Acevedo [phone], no answer, message left. Order placed for Urine Culture, I will await return call and follow-up on 8/10/21 to send RX after confirming allergies and discuss results. Dr. Ely Chávez notified.      Erin Williamson 371, NP  SSED  6:02 PM  [phone]

## 2021-08-10 ENCOUNTER — APPOINTMENT (OUTPATIENT)
Dept: GENERAL RADIOLOGY | Age: 81
End: 2021-08-10
Attending: EMERGENCY MEDICINE
Payer: MEDICARE

## 2021-08-10 ENCOUNTER — HOSPITAL ENCOUNTER (EMERGENCY)
Age: 81
Discharge: HOME | End: 2021-08-10
Attending: EMERGENCY MEDICINE
Payer: MEDICARE

## 2021-08-10 ENCOUNTER — TELEPHONE (OUTPATIENT)
Dept: NEUROLOGY | Age: 81
End: 2021-08-10

## 2021-08-10 VITALS
HEIGHT: 65 IN | RESPIRATION RATE: 16 BRPM | TEMPERATURE: 98.5 F | HEART RATE: 94 BPM | BODY MASS INDEX: 21.52 KG/M2 | DIASTOLIC BLOOD PRESSURE: 72 MMHG | SYSTOLIC BLOOD PRESSURE: 132 MMHG | WEIGHT: 129.19 LBS | OXYGEN SATURATION: 95 %

## 2021-08-10 DIAGNOSIS — M25.561 RIGHT KNEE PAIN, UNSPECIFIED CHRONICITY: ICD-10-CM

## 2021-08-10 DIAGNOSIS — M25.511 RIGHT SHOULDER PAIN, UNSPECIFIED CHRONICITY: ICD-10-CM

## 2021-08-10 DIAGNOSIS — R07.9 ACUTE CHEST PAIN: Primary | ICD-10-CM

## 2021-08-10 DIAGNOSIS — N30.01 ACUTE CYSTITIS WITH HEMATURIA: ICD-10-CM

## 2021-08-10 LAB
ALBUMIN SERPL-MCNC: 3.7 G/DL (ref 3.5–5)
ALBUMIN/GLOB SERPL: 1.2 {RATIO} (ref 1.1–2.2)
ALP SERPL-CCNC: 83 U/L (ref 45–117)
ALT SERPL-CCNC: 30 U/L (ref 12–78)
ANION GAP SERPL CALC-SCNC: 6 MMOL/L (ref 5–15)
AST SERPL-CCNC: 20 U/L (ref 15–37)
BASOPHILS # BLD: 0 K/UL (ref 0–0.1)
BASOPHILS NFR BLD: 1 % (ref 0–1)
BILIRUB SERPL-MCNC: 0.6 MG/DL (ref 0.2–1)
BUN SERPL-MCNC: 17 MG/DL (ref 6–20)
BUN/CREAT SERPL: 24 (ref 12–20)
CALCIUM SERPL-MCNC: 8.8 MG/DL (ref 8.5–10.1)
CHLORIDE SERPL-SCNC: 108 MMOL/L (ref 97–108)
CO2 SERPL-SCNC: 28 MMOL/L (ref 21–32)
CREAT SERPL-MCNC: 0.7 MG/DL (ref 0.55–1.02)
DIFFERENTIAL METHOD BLD: NORMAL
EOSINOPHIL # BLD: 0.1 K/UL (ref 0–0.4)
EOSINOPHIL NFR BLD: 1 % (ref 0–7)
ERYTHROCYTE [DISTWIDTH] IN BLOOD BY AUTOMATED COUNT: 12.9 % (ref 11.5–14.5)
GLOBULIN SER CALC-MCNC: 3.2 G/DL (ref 2–4)
GLUCOSE SERPL-MCNC: 86 MG/DL (ref 65–100)
HCT VFR BLD AUTO: 45 % (ref 35–47)
HGB BLD-MCNC: 14.9 G/DL (ref 11.5–16)
IMM GRANULOCYTES # BLD AUTO: 0 K/UL (ref 0–0.04)
IMM GRANULOCYTES NFR BLD AUTO: 0 % (ref 0–0.5)
LYMPHOCYTES # BLD: 1.7 K/UL (ref 0.8–3.5)
LYMPHOCYTES NFR BLD: 22 % (ref 12–49)
MCH RBC QN AUTO: 30.7 PG (ref 26–34)
MCHC RBC AUTO-ENTMCNC: 33.1 G/DL (ref 30–36.5)
MCV RBC AUTO: 92.8 FL (ref 80–99)
MONOCYTES # BLD: 0.6 K/UL (ref 0–1)
MONOCYTES NFR BLD: 7 % (ref 5–13)
NEUTS SEG # BLD: 5.5 K/UL (ref 1.8–8)
NEUTS SEG NFR BLD: 69 % (ref 32–75)
NRBC # BLD: 0 K/UL (ref 0–0.01)
NRBC BLD-RTO: 0 PER 100 WBC
PLATELET # BLD AUTO: 247 K/UL (ref 150–400)
PMV BLD AUTO: 9.6 FL (ref 8.9–12.9)
POTASSIUM SERPL-SCNC: 3.9 MMOL/L (ref 3.5–5.1)
PROT SERPL-MCNC: 6.9 G/DL (ref 6.4–8.2)
RBC # BLD AUTO: 4.85 M/UL (ref 3.8–5.2)
SODIUM SERPL-SCNC: 142 MMOL/L (ref 136–145)
TROPONIN I SERPL-MCNC: <0.05 NG/ML
WBC # BLD AUTO: 7.9 K/UL (ref 3.6–11)

## 2021-08-10 PROCEDURE — 36415 COLL VENOUS BLD VENIPUNCTURE: CPT

## 2021-08-10 PROCEDURE — 80053 COMPREHEN METABOLIC PANEL: CPT

## 2021-08-10 PROCEDURE — 84484 ASSAY OF TROPONIN QUANT: CPT

## 2021-08-10 PROCEDURE — 85025 COMPLETE CBC W/AUTO DIFF WBC: CPT

## 2021-08-10 PROCEDURE — 93005 ELECTROCARDIOGRAM TRACING: CPT

## 2021-08-10 PROCEDURE — 99284 EMERGENCY DEPT VISIT MOD MDM: CPT

## 2021-08-10 PROCEDURE — 71045 X-RAY EXAM CHEST 1 VIEW: CPT

## 2021-08-10 PROCEDURE — 74011250637 HC RX REV CODE- 250/637: Performed by: EMERGENCY MEDICINE

## 2021-08-10 PROCEDURE — 73560 X-RAY EXAM OF KNEE 1 OR 2: CPT

## 2021-08-10 PROCEDURE — 73030 X-RAY EXAM OF SHOULDER: CPT

## 2021-08-10 RX ORDER — METHOCARBAMOL 750 MG/1
750 TABLET, FILM COATED ORAL
Status: COMPLETED | OUTPATIENT
Start: 2021-08-10 | End: 2021-08-10

## 2021-08-10 RX ORDER — ACETAMINOPHEN 500 MG
1000 TABLET ORAL ONCE
Status: COMPLETED | OUTPATIENT
Start: 2021-08-10 | End: 2021-08-10

## 2021-08-10 RX ORDER — GRANULES FOR ORAL 3 G/1
3 POWDER ORAL ONCE
Status: COMPLETED | OUTPATIENT
Start: 2021-08-10 | End: 2021-08-10

## 2021-08-10 RX ORDER — NITROFURANTOIN 25; 75 MG/1; MG/1
100 CAPSULE ORAL
Status: COMPLETED | OUTPATIENT
Start: 2021-08-10 | End: 2021-08-10

## 2021-08-10 RX ADMIN — ACETAMINOPHEN 1000 MG: 500 TABLET ORAL at 15:22

## 2021-08-10 RX ADMIN — METHOCARBAMOL TABLETS 750 MG: 750 TABLET, COATED ORAL at 15:22

## 2021-08-10 RX ADMIN — FOSFOMYCIN TROMETHAMINE 3 G: 3 POWDER ORAL at 16:34

## 2021-08-10 RX ADMIN — NITROFURANTOIN MONOHYDRATE/MACROCRYSTALLINE 100 MG: 25; 75 CAPSULE ORAL at 15:22

## 2021-08-10 NOTE — TELEPHONE ENCOUNTER
Called and spoke with Tamgreg Rdz. Charly Rdz is on HIPAA release form. Verified patients name/. Patient is still having hallucinations and rough days. Charly Rdz stated that last night she did take her seroquel but was up all night until about 3am. Patient called Charly Rdz around 11pm.    is not seeing her take her UTI medication. Charly Rdz is considering taking her back to the ER because of this. I stated if she is not taking her medication to help her UTI and symptoms are becoming worse I would reach out to PCP as well as take her back to the ER or Urgent care.

## 2021-08-10 NOTE — TELEPHONE ENCOUNTER
----- Message from Mena Henry MD sent at 8/9/2021  2:57 PM EDT -----  Regarding: FW: Visit Follow-Up Question  Contact: [phone]  Can you please have her call her PCP regarding this? She would be better able to treat UTIs. Also, regarding the seroquel in the other message, she may need to take it twice a day, once in the morning as well. I would not want her to be too sedated, so if she does take it in the morning, someone should be with her to make sure she's not too sleepy and off balance. ----- Message -----  From: Tommie Estrella  Sent: 8/9/2021  10:20 AM EDT  To: Mena Henry MD  Subject: FW: Visit Follow-Up Question                       ----- Message -----  From: Feldman Meraryfracisco  Sent: 8/9/2021  10:00 AM EDT  To: [facility]  Subject: Visit Follow-Up Question                         Good afternoon,    My grandmother has been experiencing hallucinations the last 2 weeks. We finally got her to [facility] ER yesterday and with a lack of Psych beds she was discharged with instructions to see a psychiatrist. Late last night I saw her labs came back which seem to indicate she definitely has a UTI. A few of her labs were off that I believe indicates some sort of infection and I know UTI's can cause delirium in patients with dementia. I'm concerned about this and it was not discussed with her at the ER and they haven't received a follow up from the ER regarding this. Can you please review her labs and let me know what we need to do?     Thank you,    Gideon Mao  [phone]

## 2021-08-10 NOTE — ED PROVIDER NOTES
EMERGENCY DEPARTMENT HISTORY AND PHYSICAL EXAM      Date: 8/10/2021  Patient Name: Ame Kenyon    History of Presenting Illness     Chief Complaint   Patient presents with    Altered mental status     Hullicinations. Pt was seen at [facility] on sunday. Pt arrives with family who reports urine came back after eval and shows UTI. PCP then ordered abx, however family reports she has not been taking. Pt has not been sleeping. Pt was evaluated by [facility]. Mental health issues are increasing. Pt denies any SI or HI. Pt's family indicated pt has SI statements. History Provided By: Patient and Patient's Grandfather    HPI: Ame Kenyon, 68 y.o. female presents to the ED with cc of urinary tract infection, chest pain and knee pain. Patient has history of dementia, and was seen at [address] [place] [address] 2 days ago for hallucinations. She had lab work performed and was evaluated by [facility]. The family decided to follow-up on outpatient basis. She is not homicidal or suicidal.  After she left the ER, she was diagnosed with a urinary tract infection. Patient's granddaughter is concerned that she is not taking her antibiotics, because the patient has been avoiding taking medications from her . PCP suggested that the family come here to be treated for the UTI. Patient denies dysuria currently. She denies abdominal pain, back pain, fever or chills. She had chest discomfort last night, but cannot state how long it lasted. She states it was of moderate severity located in the midsternal region and did not radiate to the back, neck or jaw. She has been complaining of pain involving her right shoulder and upper arm since receiving her Covid vaccine months ago. That is intermittent pain. She also has had intermittent right knee pain. There are no other complaints, changes, or physical findings at this time.     PCP: Hardy Carlos MD    No current facility-administered medications on file prior to encounter. Current Outpatient Medications on File Prior to Encounter   Medication Sig Dispense Refill    nitrofurantoin, macrocrystal-monohydrate, (MACROBID) 100 mg capsule Take 1 Capsule by mouth two (2) times a day for 7 days. Indications: bacterial urinary tract infection 14 Capsule 0    donepeziL (ARICEPT) 10 mg tablet Take 1 Tablet by mouth nightly. 30 Tablet 0    QUEtiapine (SEROquel) 25 mg tablet Take 0.5 Tablets by mouth nightly. 30 Tablet 1    atorvastatin (LIPITOR) 40 mg tablet TAKE 1 TABLET BY MOUTH ONCE DAILY 90 Tablet 2    aspirin delayed-release 81 mg tablet Take 1 Tab by mouth daily. (Patient taking differently: Take 81 mg by mouth as needed.) 30 Tab 11    ibuprofen (ADVIL) 200 mg tablet Take 200 mg by mouth as needed for Pain.          Past History     Past Medical History:  Past Medical History:   Diagnosis Date    Arthritis     bilateral hands, left knee - pt got injections    Carpal tunnel syndrome     bilateral hands    Dental infection ~1998    progressed to back of neck, pt had sx that consisted of taking bone from her right hip to replace in her spinal coloum; pt in halo for 3 months    Menopause        Past Surgical History:  Past Surgical History:   Procedure Laterality Date    HX CARPAL TUNNEL RELEASE Right 11/01/2018    open    HX HYSTERECTOMY      HX OOPHORECTOMY      HX TONSILLECTOMY      HX UROLOGICAL  ~1998    Back surgery:  pt reports she had dental inf. that went to back of neck and ortho surgeon took pieces of bone from right hip to replace in spinal column; pt reports she wore halo for 3 months:  Dr. Susana Barajas (sx done at [facility])       Family History:  Family History   Problem Relation Age of Onset    Cancer Daughter     Parkinson's Disease Mother     Coronary Artery Disease Mother     Coronary Artery Disease Brother     High Cholesterol Brother        Social History:  Social History     Tobacco Use    Smoking status: Former Smoker Packs/day: 1.00     Years: 20.00     Pack years: 20.00     Quit date:      Years since quittin.6    Smokeless tobacco: Never Used   Vaping Use    Vaping Use: Never used   Substance Use Topics    Alcohol use: Yes     Alcohol/week: 3.0 standard drinks     Types: 3 Glasses of wine per week     Comment: weekly    Drug use: No       Allergies: Allergies   Allergen Reactions    Penicillins Shortness of Breath and Rash         Review of Systems   Review of Systems   Constitutional: Negative for fever. HENT: Negative for congestion. Eyes: Negative. Respiratory: Negative for shortness of breath. Cardiovascular: Positive for chest pain. Gastrointestinal: Negative for abdominal pain. Endocrine: Negative for heat intolerance. Genitourinary: Negative for dysuria. Musculoskeletal: Negative for back pain. Skin: Negative for rash. Allergic/Immunologic: Negative for immunocompromised state. Neurological: Negative for dizziness. Hematological: Does not bruise/bleed easily. Psychiatric/Behavioral: Positive for hallucinations. All other systems reviewed and are negative. Physical Exam   Physical Exam  Vitals and nursing note reviewed. Constitutional:       General: She is not in acute distress. Appearance: She is well-developed. HENT:      Head: Normocephalic. Cardiovascular:      Rate and Rhythm: Normal rate and regular rhythm. Heart sounds: Normal heart sounds. Pulmonary:      Effort: Pulmonary effort is normal.      Breath sounds: Normal breath sounds. Abdominal:      General: Bowel sounds are normal.      Palpations: Abdomen is soft. Tenderness: There is no abdominal tenderness. Musculoskeletal:         General: Tenderness present. Normal range of motion. Cervical back: Normal range of motion and neck supple. Comments: Mild right knee and right shoulder/upper arm tenderness   Skin:     General: Skin is warm and dry.    Neurological:      Mental Status: She is alert. Comments: Baseline mental status, no focal deficits   Psychiatric:         Mood and Affect: Mood normal.         Behavior: Behavior normal.         Diagnostic Study Results     Labs -     Recent Results (from the past 12 hour(s))   EKG, 12 LEAD, INITIAL    Collection Time: 08/10/21  3:06 PM   Result Value Ref Range    Ventricular Rate 81 BPM    Atrial Rate 81 BPM    P-R Interval 146 ms    QRS Duration 62 ms    Q-T Interval 368 ms    QTC Calculation (Bezet) 427 ms    Calculated P Axis 69 degrees    Calculated R Axis 69 degrees    Calculated T Axis 36 degrees    Diagnosis       Normal sinus rhythm with sinus arrhythmia  Low voltage QRS  No previous ECGs available     CBC WITH AUTOMATED DIFF    Collection Time: 08/10/21  3:15 PM   Result Value Ref Range    WBC 7.9 3.6 - 11.0 K/uL    RBC 4.85 3.80 - 5.20 M/uL    HGB 14.9 11.5 - 16.0 g/dL    HCT 45.0 35.0 - 47.0 %    MCV 92.8 80.0 - 99.0 FL    MCH 30.7 26.0 - 34.0 PG    MCHC 33.1 30.0 - 36.5 g/dL    RDW 12.9 11.5 - 14.5 %    PLATELET 244 564 - 203 K/uL    MPV 9.6 8.9 - 12.9 FL    NRBC 0.0 0  WBC    ABSOLUTE NRBC 0.00 0.00 - 0.01 K/uL    NEUTROPHILS 69 32 - 75 %    LYMPHOCYTES 22 12 - 49 %    MONOCYTES 7 5 - 13 %    EOSINOPHILS 1 0 - 7 %    BASOPHILS 1 0 - 1 %    IMMATURE GRANULOCYTES 0 0.0 - 0.5 %    ABS. NEUTROPHILS 5.5 1.8 - 8.0 K/UL    ABS. LYMPHOCYTES 1.7 0.8 - 3.5 K/UL    ABS. MONOCYTES 0.6 0.0 - 1.0 K/UL    ABS. EOSINOPHILS 0.1 0.0 - 0.4 K/UL    ABS. BASOPHILS 0.0 0.0 - 0.1 K/UL    ABS. IMM.  GRANS. 0.0 0.00 - 0.04 K/UL    DF AUTOMATED     METABOLIC PANEL, COMPREHENSIVE    Collection Time: 08/10/21  3:15 PM   Result Value Ref Range    Sodium 142 136 - 145 mmol/L    Potassium 3.9 3.5 - 5.1 mmol/L    Chloride 108 97 - 108 mmol/L    CO2 28 21 - 32 mmol/L    Anion gap 6 5 - 15 mmol/L    Glucose 86 65 - 100 mg/dL    BUN 17 6 - 20 MG/DL    Creatinine 0.70 0.55 - 1.02 MG/DL    BUN/Creatinine ratio 24 (H) 12 - 20      GFR est AA >60 >60 ml/min/1.73m2    GFR est non-AA >60 >60 ml/min/1.73m2    Calcium 8.8 8.5 - 10.1 MG/DL    Bilirubin, total 0.6 0.2 - 1.0 MG/DL    ALT (SGPT) 30 12 - 78 U/L    AST (SGOT) 20 15 - 37 U/L    Alk. phosphatase 83 45 - 117 U/L    Protein, total 6.9 6.4 - 8.2 g/dL    Albumin 3.7 3.5 - 5.0 g/dL    Globulin 3.2 2.0 - 4.0 g/dL    A-G Ratio 1.2 1.1 - 2.2     TROPONIN I    Collection Time: 08/10/21  3:15 PM   Result Value Ref Range    Troponin-I, Qt. <0.05 <0.05 ng/mL       Radiologic Studies -   XR CHEST PORT   Final Result      No acute process. XR KNEE RT MAX 2 VWS   Final Result   No acute abnormality. XR SHOULDER RT AP/LAT MIN 2 V   Final Result   No significant abnormality. .        CT Results  (Last 48 hours)    None        CXR Results  (Last 48 hours)               08/10/21 1508  XR CHEST PORT Final result    Impression:      No acute process. Narrative:  EXAM:  XR CHEST PORT       INDICATION:  pain       COMPARISON:  CT of 10/16/2020       FINDINGS:       A portable AP radiograph of the chest was obtained at 14:55 hours. The lungs are   clear. The cardiac and mediastinal contours and pulmonary vascularity are   normal.  The bones and soft tissues are unremarkable. Medical Decision Making   I am the first provider for this patient. I reviewed the vital signs, available nursing notes, past medical history, past surgical history, family history and social history. Vital Signs-Reviewed the patient's vital signs. Patient Vitals for the past 12 hrs:   Temp Pulse Resp BP SpO2   08/10/21 1413 98.5 °F (36.9 °C) 94 16 (!) 144/77 97 %       EKG interpretation: (Preliminary)  Rhythm: normal sinus rhythm; and regular .  Rate (approx.): 81; Axis: normal; MA interval: normal; QRS interval: normal ; ST/T wave: normal; Other findings: .    Records Reviewed: Nursing Notes, Old Medical Records, Previous electrocardiograms, Previous Radiology Studies and Previous Laboratory Studies    Provider Notes (Medical Decision Making):   ACS, atypical chest pain, reflux, costochondritis, arthritis, sprain, fracture, UTI, dehydration    ED Course:   Initial assessment performed. The patients presenting problems have been discussed, and they are in agreement with the care plan formulated and outlined with them. I have encouraged them to ask questions as they arise throughout their visit. Progress note:    Since the patient's granddaughter is concerned that she will complete her antibiotic course, I have ordered fosfomycin. Patient's results were reviewed. She is advised to follow-up and return to ER if worse               Critical Care Time:   0    Disposition:  home    DISCHARGE PLAN:  1. Discharge Medication List as of 8/10/2021  4:26 PM        2. Follow-up Information     Follow up With Specialties Details Why Contact Info    Dai Castaneda MD Internal Medicine  As needed [facility] [address] [address]  [phone]      [facility] EMERGENCY DEPT Emergency Medicine  If symptoms worsen [address]  [address]  [phone]        3. Return to ED if worse     Diagnosis     Clinical Impression:   1. Acute chest pain    2. Acute cystitis with hematuria    3. Right knee pain, unspecified chronicity    4. Right shoulder pain, unspecified chronicity        Attestations:    Car Chowdary MD    Please note that this dictation was completed with [employer], the computer voice recognition software. Quite often unanticipated grammatical, syntax, homophones, and other interpretive errors are inadvertently transcribed by the computer software. Please disregard these errors. Please excuse any errors that have escaped final proofreading. Thank you.

## 2021-08-11 LAB
ATRIAL RATE: 81 BPM
BACTERIA SPEC CULT: ABNORMAL
CALCULATED P AXIS, ECG09: 69 DEGREES
CALCULATED R AXIS, ECG10: 69 DEGREES
CALCULATED T AXIS, ECG11: 36 DEGREES
CC UR VC: ABNORMAL
DIAGNOSIS, 93000: NORMAL
P-R INTERVAL, ECG05: 146 MS
Q-T INTERVAL, ECG07: 368 MS
QRS DURATION, ECG06: 62 MS
QTC CALCULATION (BEZET), ECG08: 427 MS
SERVICE CMNT-IMP: ABNORMAL
VENTRICULAR RATE, ECG03: 81 BPM

## 2021-08-11 NOTE — TELEPHONE ENCOUNTER
Called and spoke to Bernarda Snellen. Bernarda Snellen stated they took Shira Colette to the ER. They gave her a one time antibiotic that would knock out UTI since she would not take the pills. She was having daily hallucinations of seeing multiple people of her . She would not take the medication from him. She did take her Aricept and Seroquel last night. When she gets in the mode of seeing multiple husbands, she gets fearful and say things like \"I don't know If I can do this anymore. \" or \"I would kill myself if I have to be committed. \" They express that to the ER and meredith stated they didn't really address this situation. When hours prior she had just told meredith that she would walk [place] the middle of the [place]. Bernarda Snellen has questions regarding medications and when to consider trying Namenda. Stated if she does have feelings of self-harm or if the patients symptoms get worse I stated to take the patient to the nearest ER. Bernarda Snellen stated a comment made in ER her hallucinations are bad and her  can't take care of her due to her being fearful. She would like to know what to do because the ER wasn't much help.

## 2021-08-11 NOTE — TELEPHONE ENCOUNTER
Can you please let patient know that starting namenda will unlikely improve the current situation as it only will slow progression of the disease. It will not help with the hallucinations.      Let her know that I am sorry about the Er visit yesterday, however if the patients safety or well being are at risk she should come back to the ER and I can have her admitted and evaluated for long term placement

## 2021-08-29 ENCOUNTER — PATIENT MESSAGE (OUTPATIENT)
Dept: NEUROLOGY | Age: 81
End: 2021-08-29

## 2021-08-29 DIAGNOSIS — G30.9 ALZHEIMER'S DISEASE, UNSPECIFIED (CODE) (HCC): Primary | ICD-10-CM

## 2021-08-30 DIAGNOSIS — R41.3 MEMORY CHANGE: ICD-10-CM

## 2021-08-30 RX ORDER — DONEPEZIL HYDROCHLORIDE 10 MG/1
10 TABLET, FILM COATED ORAL
Qty: 90 TABLET | Refills: 3 | Status: SHIPPED | OUTPATIENT
Start: 2021-08-30 | End: 2022-03-14

## 2021-08-30 NOTE — TELEPHONE ENCOUNTER
Patient requesting a refill of donepezil. Last office visit: 8/6/21    Last refill: 8/6/21    Please review and fill if warranted.

## 2021-08-30 NOTE — TELEPHONE ENCOUNTER
From: Kylah  Pace  To: Cheli Darnell MD  Sent: 8/29/2021 3:12 PM EDT  Subject: Prescription Question    Good afternoon,    My grandmothers Aricept 10mg does not have any refills per her . Can that be called into her pharmacy? She has been doing much better since her UTI cleared.      Thank you,    [employer]

## 2021-09-02 DIAGNOSIS — R41.3 MEMORY CHANGE: ICD-10-CM

## 2021-09-02 RX ORDER — DONEPEZIL HYDROCHLORIDE 10 MG/1
TABLET, FILM COATED ORAL
Qty: 30 TABLET | Refills: 0 | Status: SHIPPED | OUTPATIENT
Start: 2021-09-02 | End: 2022-07-30

## 2021-09-03 RX ORDER — DONEPEZIL HYDROCHLORIDE 10 MG/1
10 TABLET, FILM COATED ORAL
Qty: 30 TABLET | Refills: 0 | Status: SHIPPED | OUTPATIENT
Start: 2021-09-03 | End: 2022-03-14

## 2021-11-20 ENCOUNTER — TELEPHONE (OUTPATIENT)
Dept: INTERNAL MEDICINE CLINIC | Age: 81
End: 2021-11-20

## 2021-11-20 NOTE — TELEPHONE ENCOUNTER
----- Message from 27 bards sent at 2021  1:38 PM EST -----  Subject: Appointment Request    Reason for Call: New Patient Request Appointment    QUESTIONS  Type of Appointment? New Patient/New to Provider  Reason for appointment request? No appointments available during search  Additional Information for Provider? Patient Granddaughter is calling to   see if possible patient can be seen by Isaias Gage she stated that she   spoken with someone and we have limited appts but they was suppose to give   a call back if can please update patient about appt   ---------------------------------------------------------------------------  --------------  [address]  What is the best way for the office to contact you? OK to leave message on   voicemail  Preferred Call Back Phone Number? [phone]  ---------------------------------------------------------------------------  --------------  SCRIPT ANSWERS  Relationship to Patient? Other  Representative Name? Jen  Additional information verified (besides Name and Date of Birth)? Address  Specialty Confirmation? Primary Care  Is this the first appointment to establish care for a ? No  Have you been diagnosed with, awaiting test results for, or told that you   are suspected of having COVID-19 (Coronavirus)? (If patient has tested   negative or was tested as a requirement for work, school, or travel and   not based on symptoms, answer no)? No  Within the past two weeks have you developed any of the following symptoms   (answer no if symptoms have been present longer than 2 weeks or began   more than 2 weeks ago)? Fever or Chills, Cough, Shortness of breath or   difficulty breathing, Loss of taste or smell, Sore throat, Nasal   congestion, Sneezing or runny nose, Fatigue or generalized body aches   (answer no if pain is specific to a body part e.g. back pain), Diarrhea,   Headache?  No  Have you had close contact with someone with COVID-19 in the last 14 days?   No  (Service Expert  click yes below to proceed with Skills Matter As Usual   Scheduling)?  Yes

## 2021-12-08 ENCOUNTER — TELEPHONE (OUTPATIENT)
Dept: NEUROLOGY | Age: 81
End: 2021-12-08

## 2021-12-08 NOTE — TELEPHONE ENCOUNTER
Pt's lefty (caregiver) called. Pt's PCP was Luc Hong. The PCP office does not have any doctors available to  care until March. Pt is struggling with anxiety and lefty would like to discuss if there is something she can take with all the other medications she is on. Fabricio Kuo also states that pt had a UTI last month that my have effected her alzheimer's. Wants to talk to a nurse to see if it is possible to get her seen sooner. Explained that Dr. Jacey Claudio is still on maternity leave.  Please call [phone]

## 2021-12-09 DIAGNOSIS — R41.3 MEMORY CHANGE: Primary | ICD-10-CM

## 2021-12-09 RX ORDER — ESCITALOPRAM OXALATE 10 MG/1
10 TABLET ORAL
Qty: 30 TABLET | Refills: 11 | Status: SHIPPED | OUTPATIENT
Start: 2021-12-09 | End: 2022-08-09 | Stop reason: SDUPTHER

## 2021-12-09 NOTE — TELEPHONE ENCOUNTER
I called the patient, talked to the granddaughter, the new thing for anxiety, so rather than increase the Seroquel we will try them on Lexapro 10 mg at supper and see how she does with that, they have a scheduled appointment in March and will keep that appointment unless something else happens

## 2021-12-16 DIAGNOSIS — R41.3 MEMORY CHANGE: ICD-10-CM

## 2021-12-16 RX ORDER — QUETIAPINE FUMARATE 25 MG/1
12.5 TABLET, FILM COATED ORAL
Qty: 30 TABLET | Refills: 0 | Status: SHIPPED | OUTPATIENT
Start: 2021-12-16 | End: 2022-10-31 | Stop reason: ALTCHOICE

## 2021-12-16 NOTE — TELEPHONE ENCOUNTER
Requested Prescriptions     Pending Prescriptions Disp Refills    QUEtiapine (SEROquel) 25 mg tablet 30 Tablet 1     Sig: Take 0.5 Tablets by mouth nightly.        Last Refill: 8/6/21  Last Appointment:7/29/21

## 2022-03-14 ENCOUNTER — OFFICE VISIT (OUTPATIENT)
Dept: INTERNAL MEDICINE CLINIC | Age: 82
End: 2022-03-14
Payer: MEDICARE

## 2022-03-14 VITALS
OXYGEN SATURATION: 98 % | HEART RATE: 65 BPM | HEIGHT: 65 IN | BODY MASS INDEX: 21.66 KG/M2 | WEIGHT: 130 LBS | RESPIRATION RATE: 16 BRPM | SYSTOLIC BLOOD PRESSURE: 120 MMHG | DIASTOLIC BLOOD PRESSURE: 80 MMHG | TEMPERATURE: 97.3 F

## 2022-03-14 DIAGNOSIS — M54.50 ACUTE MIDLINE LOW BACK PAIN WITHOUT SCIATICA: ICD-10-CM

## 2022-03-14 DIAGNOSIS — R16.0 LIVER MASS: ICD-10-CM

## 2022-03-14 DIAGNOSIS — E55.9 VITAMIN D DEFICIENCY: ICD-10-CM

## 2022-03-14 DIAGNOSIS — G30.1 LATE ONSET ALZHEIMER'S DEMENTIA WITH BEHAVIORAL DISTURBANCE (HCC): ICD-10-CM

## 2022-03-14 DIAGNOSIS — I25.83 CORONARY ARTERY DISEASE DUE TO LIPID RICH PLAQUE: ICD-10-CM

## 2022-03-14 DIAGNOSIS — E78.00 HIGH CHOLESTEROL: ICD-10-CM

## 2022-03-14 DIAGNOSIS — I25.10 CORONARY ARTERY DISEASE DUE TO LIPID RICH PLAQUE: ICD-10-CM
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
## 2022-03-14 DIAGNOSIS — E78.2 MIXED HYPERLIPIDEMIA: Primary | ICD-10-CM

## 2022-03-14 DIAGNOSIS — M81.0 AGE-RELATED OSTEOPOROSIS WITHOUT CURRENT PATHOLOGICAL FRACTURE: ICD-10-CM

## 2022-03-14 DIAGNOSIS — F02.818 LATE ONSET ALZHEIMER'S DEMENTIA WITH BEHAVIORAL DISTURBANCE (HCC): ICD-10-CM

## 2022-03-14 PROCEDURE — G8427 DOCREV CUR MEDS BY ELIG CLIN: HCPCS | Performed by: INTERNAL MEDICINE

## 2022-03-14 PROCEDURE — G8536 NO DOC ELDER MAL SCRN: HCPCS | Performed by: INTERNAL MEDICINE

## 2022-03-14 PROCEDURE — G8420 CALC BMI NORM PARAMETERS: HCPCS | Performed by: INTERNAL MEDICINE

## 2022-03-14 PROCEDURE — G0463 HOSPITAL OUTPT CLINIC VISIT: HCPCS | Performed by: INTERNAL MEDICINE

## 2022-03-14 PROCEDURE — 1101F PT FALLS ASSESS-DOCD LE1/YR: CPT | Performed by: INTERNAL MEDICINE

## 2022-03-14 PROCEDURE — 99204 OFFICE O/P NEW MOD 45 MIN: CPT | Performed by: INTERNAL MEDICINE

## 2022-03-14 PROCEDURE — 1090F PRES/ABSN URINE INCON ASSESS: CPT | Performed by: INTERNAL MEDICINE

## 2022-03-14 PROCEDURE — G8510 SCR DEP NEG, NO PLAN REQD: HCPCS | Performed by: INTERNAL MEDICINE

## 2022-03-14 RX ORDER — ATORVASTATIN CALCIUM 40 MG/1
TABLET, FILM COATED ORAL
Qty: 90 TABLET | Refills: 2 | Status: SHIPPED | OUTPATIENT
Start: 2022-03-14 | End: 2022-05-06 | Stop reason: SDUPTHER

## 2022-03-14 NOTE — PROGRESS NOTES
Ms. Verna Salcedo is presenting to [facility] are     CC:  Establish Care, Fall, Incontinence, and Back Pain       HPI:    81 yo woman presenting to [facility]    Had a fall when she was raking leaves and hit her buttox had transient increase in urine frequency  And had back pain since then symptoms subsided    Difficult to obtain history due to dementia and patient is not accompanied by family today    Patient has no complaints. Chart reviewed     She has a hx hyperlipidemia and arthritis and osteoporosis and liver mass. Liver mass biopsied and benign. Recently saw neurologist (Dr Rody Loyd)  and evaluated for ognitive impairment, \"her neurological exam does reveal that she has delayed recall otherwise seems unremarkable. MRI of the brain only revealed mild atrophy and mild white matter changes therefore her symptoms are likely due to an Alzheimer's type dementia. \"  · She is currently on aricept 10mg daily  · For agitation she was started on seroquel low dose, EKG checked and QTC was less then 450  ·       Has dementia see Dr Rody Loyd  On [place]   Denies depression   Takes       Osteoporosis has deferred treatiment    Review of systems:  Constitutional: negative for fever, chills, weight loss, night sweats   Eyes : negative for vision changes, eye pain and discharge  Nose and Throat: negative for tinnitus, sore throat   Cardiovascular: negative for chest pain, palpitations and shortness of breath  Respiratory: negative for shortness of breath, cough and wheezing   Gastroinstestinal: negative for abdominal pain, nausea, vomiting, diarrhea, constipation, and blood in the stool  Musculoskeletal: see HPI  Genitourinary: negative for dysuria, nocturia, polyuria and hematuria   Neurologic: Negative for focal weakness, numbness or incoordination  Skin: negative for rash, pruritus  Hematologic: negative for easy bruising      Past Medical History:   Diagnosis Date    Arthritis     bilateral hands, left knee - pt got injections    Carpal tunnel syndrome     bilateral hands    Dental infection ~1998    progressed to back of neck, pt had sx that consisted of taking bone from her right hip to replace in her spinal coloum; pt in halo for 3 months    Depression     Hypercholesterolemia     Menopause         Past Surgical History:   Procedure Laterality Date    HX CARPAL TUNNEL RELEASE Right 11/01/2018    open    HX HYSTERECTOMY      HX OOPHORECTOMY      HX TONSILLECTOMY      HX UROLOGICAL  ~1998    Back surgery:  pt reports she had dental inf. that went to back of neck and ortho surgeon took pieces of bone from right hip to replace in spinal column; pt reports she wore halo for 3 months:  Dr. Nettie Boss (sx done at [facility])       Allergies   Allergen Reactions    Penicillins Shortness of Breath and Rash       Current Outpatient Medications on File Prior to Visit   Medication Sig Dispense Refill    QUEtiapine (SEROquel) 25 mg tablet Take 0.5 Tablets by mouth nightly. 30 Tablet 0    escitalopram oxalate (LEXAPRO) 10 mg tablet Take 1 Tablet by mouth daily (with dinner). 30 Tablet 11    donepeziL (ARICEPT) 10 mg tablet TAKE 1 TABLET BY MOUTH EVERY NIGHT 30 Tablet 0    aspirin delayed-release 81 mg tablet Take 1 Tab by mouth daily. (Patient taking differently: Take 81 mg by mouth as needed.) 30 Tab 11    ibuprofen (ADVIL) 200 mg tablet Take 200 mg by mouth as needed for Pain. No current facility-administered medications on file prior to visit. family history includes Cancer in her daughter; Coronary Art Dis in her brother and mother; High Cholesterol in her brother; Parkinson's Disease in her mother.     Social History     Socioeconomic History    Marital status:      Spouse name: Not on file    Number of children: Not on file    Years of education: Not on file    Highest education level: Not on file   Occupational History    Not on file   Tobacco Use    Smoking status: Former Smoker Packs/day: 1.00     Years: 20.00     Pack years: 20.00     Quit date:      Years since quittin.2    Smokeless tobacco: Never Used   Vaping Use    Vaping Use: Never used   Substance and Sexual Activity    Alcohol use: Yes     Alcohol/week: 3.0 standard drinks     Types: 3 Glasses of wine per week     Comment: weekly    Drug use: No    Sexual activity: Not Currently   Other Topics Concern    Not on file   Social History Narrative    Not on file     Social Determinants of Health     Financial Resource Strain:     Difficulty of Paying Living Expenses: Not on file   Food Insecurity:     Worried About Running Out of Food in the Last Year: Not on file    [place] of Food in the Last Year: Not on file   Transportation Needs:     Lack of Transportation (Medical): Not on file    Lack of Transportation (Non-Medical):  Not on file   Physical Activity:     Days of Exercise per Week: Not on file    Minutes of Exercise per Session: Not on file   Stress:     Feeling of Stress : Not on file   Social Connections:     Frequency of Communication with Friends and Family: Not on file    Frequency of Social Gatherings with Friends and Family: Not on file    Attends Shinto Services: Not on file    Active Member of [address] [place] [address] [employer] or Organizations: Not on file    Attends Club or Organization Meetings: Not on file    Marital Status: Not on file   Intimate Partner Violence:     Fear of Current or Ex-Partner: Not on file    Emotionally Abused: Not on file    Physically Abused: Not on file    Sexually Abused: Not on file   Housing Stability:     Unable to Pay for Housing in the Last Year: Not on file    Number of [place] in the Last Year: Not on file    Unstable Housing in the Last Year: Not on file       Visit Vitals  /80   Pulse 65   Temp 97.3 °F (36.3 °C) (Temporal)   Resp 16   Ht 5' 5\" (1.651 m)   Wt 130 lb (59 kg)   SpO2 98%   BMI 21.63 kg/m²     General:  Well appearing female no acute distress  HEENT:   PERRL,normal conjunctiva. External ear and canals normal, TMs normal.  Hearing normal to voice. Nose without edema or discharge, normal septum. Lips, teeth, gums normal.  Oropharynx: no erythema, no exudates, no lesions, normal tongue. Neck:  Supple. Thyroid normal size, nontender, without nodules. No carotid bruit. No masses or lymphadenopathy  Respiratory: no respiratory distress,  no wheezing, no rhonchi, no rales. No chest wall tenderness. Cardiovascular:  RRR, normal S1S2, no murmur. Gastrointestinal: normal bowel sounds, soft, nontender, without masses. No hepatosplenomegaly. Extremities +2 pulses, no edema, normal sensation   Musculoskeletal:  Normal gait. Normal digits and nails. Normal strength and tone, no atrophy, and no abnormal movement. Skin:  No rash, no lesions, no ulcers. Skin warm, normal turgor, without induration or nodules. Neuro:  A and OX4, fluent speech, cranial nerves normal 2-12. Sensation normal to light touch.   DTR symmetrical  Psych:  Normal affect      Lab Results   Component Value Date/Time    WBC 7.9 08/10/2021 03:15 PM    HGB 14.9 08/10/2021 03:15 PM    HCT 45.0 08/10/2021 03:15 PM    PLATELET 806 78/84/6313 03:15 PM    MCV 92.8 08/10/2021 03:15 PM     Lab Results   Component Value Date/Time    Sodium 138 03/14/2022 12:23 PM    Potassium 4.4 03/14/2022 12:23 PM    Chloride 106 03/14/2022 12:23 PM    CO2 28 03/14/2022 12:23 PM    Anion gap 4 (L) 03/14/2022 12:23 PM    Glucose 90 03/14/2022 12:23 PM    BUN 19 03/14/2022 12:23 PM    Creatinine 0.77 03/14/2022 12:23 PM    BUN/Creatinine ratio 25 (H) 03/14/2022 12:23 PM    GFR est AA >60 03/14/2022 12:23 PM    GFR est non-AA >60 03/14/2022 12:23 PM    Calcium 10.2 (H) 03/14/2022 12:23 PM     Lab Results   Component Value Date/Time    Cholesterol, total 199 03/14/2022 12:23 PM    HDL Cholesterol 107 03/14/2022 12:23 PM    LDL, calculated 73.2 03/14/2022 12:23 PM    VLDL, calculated 18.8 03/14/2022 12:23 PM Triglyceride 94 03/14/2022 12:23 PM    CHOL/HDL Ratio 1.9 03/14/2022 12:23 PM     Lab Results   Component Value Date/Time    TSH, 3rd generation 1.22 09/30/2020 03:11 PM     Lab Results   Component Value Date/Time    Hemoglobin A1c 5.8 (H) 09/30/2020 03:11 PM     Lab Results   Component Value Date/Time    Vitamin D 25-Hydroxy 17.2 (L) 03/14/2022 12:23 PM                   Assessment and Plan:   New patient evaluation      Mixed hyperlipidemia: on statin check lipid panel    Late onset Alzheimer's dementia with behavioral disturbance (HCC)  On aricept and seroquel per Dr Angelia Barrera, initial encounter given report of fall and hx of osteoporosis checked   - XR SPINE LUMB 2 OR 3 V; Future no fracture    Acute midline low back pain without sciatica  Due to fall and already improving     Liver mass biopsied and benig  - METABOLIC PANEL, COMPREHENSIVE; Future  - METABOLIC PANEL, COMPREHENSIVE     Vitamin D deficiency  - VITAMIN D, 25 HYDROXY; Future     Age-related osteoporosis without current pathological fracture  - VITAMIN D, 25 HYDROXY; Future  Declined bone building agent  Recommend weight bearing exercise, like walking, fall prevention, and avoidance of heavy alcohol use. Calcium 1200mg daily, most from foods, less from supplement. Start Vitamin D3 2,000 iu once a day. Recheck bone density test in 3 years. Follow-up and Dispositions    · Return in about 6 months (around 9/14/2022) for dementia and cholesterol.           Jonny Brambila MD

## 2022-03-14 NOTE — PROGRESS NOTES
1. \"Have you been to the ER, urgent care clinic since your last visit? Hospitalized since your last visit? \" No    2. \"Have you seen or consulted any other health care providers outside of the [address] [place] [address] since your last visit? \" Yes Reason for visit: Eye Doctor     3. For patients aged 39-70: Has the patient had a colonoscopy / FIT/ Cologuard? NA - based on age      If the patient is female:    4. For patients aged 41-77: Has the patient had a mammogram within the past 2 years? NA - based on age or sex      11. For patients aged 21-65: Has the patient had a pap smear?  NA - based on age or sex

## 2022-03-15 LAB
25(OH)D3 SERPL-MCNC: 17.2 NG/ML (ref 30–100)
ALBUMIN SERPL-MCNC: 4.1 G/DL (ref 3.5–5)
ALBUMIN/GLOB SERPL: 1.2 {RATIO} (ref 1.1–2.2)
ALP SERPL-CCNC: 97 U/L (ref 45–117)
ALT SERPL-CCNC: 29 U/L (ref 12–78)
ANION GAP SERPL CALC-SCNC: 4 MMOL/L (ref 5–15)
AST SERPL-CCNC: 20 U/L (ref 15–37)
BILIRUB SERPL-MCNC: 0.8 MG/DL (ref 0.2–1)
BUN SERPL-MCNC: 19 MG/DL (ref 6–20)
BUN/CREAT SERPL: 25 (ref 12–20)
CALCIUM SERPL-MCNC: 10.2 MG/DL (ref 8.5–10.1)
CHLORIDE SERPL-SCNC: 106 MMOL/L (ref 97–108)
CHOLEST SERPL-MCNC: 199 MG/DL
CO2 SERPL-SCNC: 28 MMOL/L (ref 21–32)
CREAT SERPL-MCNC: 0.77 MG/DL (ref 0.55–1.02)
GLOBULIN SER CALC-MCNC: 3.4 G/DL (ref 2–4)
GLUCOSE SERPL-MCNC: 90 MG/DL (ref 65–100)
HDLC SERPL-MCNC: 107 MG/DL
HDLC SERPL: 1.9 {RATIO} (ref 0–5)
LDLC SERPL CALC-MCNC: 73.2 MG/DL (ref 0–100)
POTASSIUM SERPL-SCNC: 4.4 MMOL/L (ref 3.5–5.1)
PROT SERPL-MCNC: 7.5 G/DL (ref 6.4–8.2)
SODIUM SERPL-SCNC: 138 MMOL/L (ref 136–145)
TRIGL SERPL-MCNC: 94 MG/DL (ref ?–150)
VLDLC SERPL CALC-MCNC: 18.8 MG/DL

## 2022-03-17 ENCOUNTER — HOSPITAL ENCOUNTER (OUTPATIENT)
Dept: GENERAL RADIOLOGY | Age: 82
Discharge: HOME | End: 2022-03-17
Payer: MEDICARE

## 2022-03-17 ENCOUNTER — OFFICE VISIT (OUTPATIENT)
Dept: NEUROLOGY | Age: 82
End: 2022-03-17
Payer: MEDICARE

## 2022-03-17 VITALS
SYSTOLIC BLOOD PRESSURE: 120 MMHG | DIASTOLIC BLOOD PRESSURE: 78 MMHG | RESPIRATION RATE: 18 BRPM | WEIGHT: 130 LBS | OXYGEN SATURATION: 96 % | BODY MASS INDEX: 21.63 KG/M2 | TEMPERATURE: 97.6 F | HEART RATE: 79 BPM
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
## 2022-03-17 DIAGNOSIS — R20.0 BILATERAL HAND NUMBNESS: ICD-10-CM

## 2022-03-17 DIAGNOSIS — R41.3 MEMORY CHANGE: ICD-10-CM

## 2022-03-17 DIAGNOSIS — G30.9 ALZHEIMER'S DISEASE, UNSPECIFIED (CODE) (HCC): Primary | ICD-10-CM

## 2022-03-17 PROCEDURE — G8427 DOCREV CUR MEDS BY ELIG CLIN: HCPCS | Performed by: PSYCHIATRY & NEUROLOGY

## 2022-03-17 PROCEDURE — G8510 SCR DEP NEG, NO PLAN REQD: HCPCS | Performed by: PSYCHIATRY & NEUROLOGY

## 2022-03-17 PROCEDURE — G8536 NO DOC ELDER MAL SCRN: HCPCS | Performed by: PSYCHIATRY & NEUROLOGY

## 2022-03-17 PROCEDURE — 72100 X-RAY EXAM L-S SPINE 2/3 VWS: CPT

## 2022-03-17 PROCEDURE — 1101F PT FALLS ASSESS-DOCD LE1/YR: CPT | Performed by: PSYCHIATRY & NEUROLOGY

## 2022-03-17 PROCEDURE — G8420 CALC BMI NORM PARAMETERS: HCPCS | Performed by: PSYCHIATRY & NEUROLOGY

## 2022-03-17 PROCEDURE — G8399 PT W/DXA RESULTS DOCUMENT: HCPCS | Performed by: PSYCHIATRY & NEUROLOGY

## 2022-03-17 PROCEDURE — 99214 OFFICE O/P EST MOD 30 MIN: CPT | Performed by: PSYCHIATRY & NEUROLOGY

## 2022-03-17 PROCEDURE — 1090F PRES/ABSN URINE INCON ASSESS: CPT | Performed by: PSYCHIATRY & NEUROLOGY

## 2022-03-17 NOTE — PROGRESS NOTES
Neurology Note    Patient ID:  Jay Mayen  [id number]  22 y.o.  1940      Date of Consultation:  March 17, 2022    Subjective:       History of Present Illness:   Jay Mayen is a 80 y.o. female with a history of hyperlipidemia and arthritis who presents to neurology clinic for evaluation of cognitive impairment. Patient reports that this has been going on for several years however has been more noticeable in the last few months. She reports that her short-term memory has been mostly affected where she frequently forgets conversations or tasks. She has no trouble with her long-term memory. She has also forgotten her appointments and needs to be reminded. Her  and her granddaughter help her with these appointments. Otherwise she has not forgotten taking her medications however she does use a pillbox. Is still able to cook and clean and does not leave tasks unfinished. She has not had any issues with leaving the stove on. She spends her day with her  and they, walks together and she spends her time cleaning. Also likes to play [employer] on her iPad. She has not lost interest in any of her old hobbies however due to the coronavirus, she has picked up new hobbies and is enjoying them. She reports that she sleeps well and eats a fairly well-balanced diet. She does report smoking however she quit 10 years ago. Additionally she reports approximately 1 month ago she started taking prevention and feels like it may be improving her symptoms. Her  is the one who maintains finances. Of note patient does report that she has numbness in her bilateral hands. She says that this is been going on for years and underwent carpal tunnel release on the right. She does not have significant pain however has dropped things due to the numbness. She reports that it is all 5 of her fingers and it goes down into the palms of her hand.   She does not remember if she has had an EMG done prior to her surgery. 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 She does not know if it affected her brain and that is what is causing her issues with memory. She does remember that she was in a halo for several weeks and required extensive rehab. Interval hx:   Patient returns to neurology clinic and reports that she is doing well. She recently did have a fall while working in her garden and hurt her back. She does have an x-ray ordered. She otherwise reports she is doing well and her memory has not worsened. She was started on Lexapro in December and has noticed a significant improvement in overall mood and memory. She does report again numbness in her bilateral hands however does not feel like this is too severe.     Past Medical History:   Diagnosis Date    Arthritis     bilateral hands, left knee - pt got injections    Carpal tunnel syndrome     bilateral hands    Dental infection ~1998    progressed to back of neck, pt had sx that consisted of taking bone from her right hip to replace in her spinal coloum; pt in halo for 3 months    Depression     Hypercholesterolemia     Menopause         Past Surgical History:   Procedure Laterality Date    HX CARPAL TUNNEL RELEASE Right 11/01/2018    open    HX HYSTERECTOMY      HX OOPHORECTOMY      HX TONSILLECTOMY      HX UROLOGICAL  ~1998    Back surgery:  pt reports she had dental inf. that went to back of neck and ortho surgeon took pieces of bone from right hip to replace in spinal column; pt reports she wore halo for 3 months:  Dr. Rachelle Coleman (sx done at [facility])        Family History   Problem Relation Age of Onset    Cancer Daughter     Parkinson's Disease Mother     Coronary Art Dis Mother     Coronary Art Dis Brother     High Cholesterol Brother         Social History     Tobacco Use    Smoking status: Former Smoker     Packs/day: 1.00     Years: 20.00     Pack years: 20.00     Quit date:      Years since quittin.2    Smokeless tobacco: Never Used   Substance Use Topics    Alcohol use: Yes     Alcohol/week: 3.0 standard drinks     Types: 3 Glasses of wine per week     Comment: weekly        Allergies   Allergen Reactions    Penicillins Shortness of Breath and Rash        Prior to Admission medications    Medication Sig Start Date End Date Taking? Authorizing Provider   atorvastatin (LIPITOR) 40 mg tablet TAKE 1 TABLET BY MOUTH ONCE DAILY 3/14/22  Yes Travis Lara MD   QUEtiapine (SEROquel) 25 mg tablet Take 0.5 Tablets by mouth nightly. 21  Yes Duane Challenger, MD   escitalopram oxalate (LEXAPRO) 10 mg tablet Take 1 Tablet by mouth daily (with dinner). 21  Yes José Miguel Bedoya MD   donepeziL (ARICEPT) 10 mg tablet TAKE 1 TABLET BY MOUTH EVERY NIGHT 21  Yes Erik Iraheta MD   aspirin delayed-release 81 mg tablet Take 1 Tab by mouth daily. Patient taking differently: Take 81 mg by mouth as needed. 20  Yes Shelly DE ANDA NP   ibuprofen (ADVIL) 200 mg tablet Take 200 mg by mouth as needed for Pain.    Yes Provider, Historical       Review of Systems:    General, constitutional: negative  Eyes, vision: negative  Ears, nose, throat: negative  Cardiovascular, heart: negative  Respiratory: negative  Gastrointestinal: negative  Genitourinary: negative  Musculoskeletal: negative  Skin and integumentary: negative  Psychiatric: negative  Endocrine: negative  Neurological: negative, except for HPI  Hematologic/lymphatic: negative  Allergy/immunology: negative    Objective:     Visit Vitals  /78   Pulse 79   Temp 97.6 °F (36.4 °C) (Temporal)   Resp 18   Wt 130 lb (59 kg)   SpO2 96%   BMI 21.63 kg/m²       Physical Exam:  General:  no acute distress  Neck: no carotid bruits  Lungs: clear to auscultation  Heart:  no murmurs, regular rate and rhythm   Lower extremity: no edema    Neurological exam:  Mental Status: Awake, alert, oriented to person, place and time  Attention and Concentration: able to state the days of the week backwards   Speech and Language: No dysarthria. Able to name, repeat and follow commands   Fund of knowledge was preserved    Cranial nerves: II-XII  Pupils equal and reactive, visual fields intact by confrontation   Extraocular movements intact, no evidence of nystagmus or ptosis   Facial sensation intact   Facial movements symmetric   Hearing intact to soft rub bilaterally   Shoulder shrug symmetric and strong   Tongue protrusion full and midline without fasciculation or atrophy    Motor:   Normal tone and Bulk, with the exception of the thenar eminence bilaterally that show some atrophy  Drift: No evidence of pronator drift     Strength testing:   deltoid triceps biceps Wrist ext. Wrist flex. intrinsics   Right 5 5 5 5 5 5   Left 5 5 5 5 5 5      Hip flex. Hip ext. Knee ext. Knee flex Dorsi flex Plantar flex   Right  5 5 5 5 5 5   Left  5 5 5 5 5 5       Sensory:  Intact to light touch and pinprick, no extinction     Reflexes:     Biceps Triceps  Brachiorad Patellar Achilles Plantar Hoffmans   Right  2 2 2 2 2 Down Neg   Left  2 2 2 2 2 Down Neg      Cerebellar testing:  No dysmetria. Normal rapid alternating movements; finger-to-nose and heel-to- shin testing are within normal limits.     Romberg: absent    Gait: steady    Labs:     Lab Results   Component Value Date/Time    Hemoglobin A1c 5.8 (H) 09/30/2020 03:11 PM    Sodium 138 03/14/2022 12:23 PM    Potassium 4.4 03/14/2022 12:23 PM    Chloride 106 03/14/2022 12:23 PM    Glucose 90 03/14/2022 12:23 PM    BUN 19 03/14/2022 12:23 PM    Creatinine 0.77 03/14/2022 12:23 PM    Calcium 10.2 (H) 03/14/2022 12:23 PM    WBC 7.9 08/10/2021 03:15 PM    HCT 45.0 08/10/2021 03:15 PM    HGB 14.9 08/10/2021 03:15 PM    PLATELET 110 84/03/3138 03:15 PM       Imaging:    Results from Hospital Encounter encounter on 12/17/20    MRI BRAIN WO CONT    Narrative  BRAIN MRI WITHOUT CONTRAST: 12/17/2020 10:31 AM    INDICATION: Cognitive impairment, amnesia, memory change. COMPARISON: None. TECHNIQUE: Images were obtained in multiple planes and sequences to emphasize  T1, T2, and T2* information. Diffusion-weighted images and ADC maps were also  obtained. FINDINGS: The ventricles and sulci are appropriate for age. The main  intracranial flow-voids are normal. A few, scattered, periventricular and  subcortical white matter signal abnormalities are consistent with minimal  chronic small vessel ischemic disease. This is within the range of normal for  age. No restricted diffusion to suggest acute infarction. No hemorrhage. Impression  IMPRESSION: No acute intracranial abnormality. Results from [place] encounter on 10/23/20    CT ABD W WO CONT    Narrative  EXAM: CT ABD W WO CONT    INDICATION: Evaluate liver abnormality. COMPARISON: CT thorax 10/16/2020. CONTRAST: 100 mL of Isovue-370. TECHNIQUE:  Multislice helical CT was performed from the diaphragm to the iliac crest prior  to and during rapid bolus intravenous contrast administration. Precontrast,  hepatic arterial, portal venous, and equilibrium phase imaging obtained. Oral  contrast was not administered. Contiguous 5 mm axial images were reconstructed  and lung and soft tissue windows were generated. Coronal and sagittal  reformations were generated. CT dose reduction was achieved through use of a  standardized protocol tailored for this examination and automatic exposure  control for dose modulation.     FINDINGS:    LOWER THORAX: No significant abnormality in the incidentally imaged lower chest.    LIVER: Low-density lobulated mass with associated central coarse calcification  and peripheral transient arterial phase enhancement is seen in the right lobe  liver with craniocaudal extent up to 5.6 cm and maximal transaxial dimensions of  3.9 x 4. 3 cm. There is some associated overlying capsular retraction. A small  focus of arterial phase enhancement with peripheral blush and persistent  enhancement on delayed images is seen in subcapsular segment 4A measuring  approximately 11 mm. No other focal hepatic lesions are seen. Hepatic vascular  structures opacify normally otherwise. BILIARY TREE: Gallbladder is within normal limits. CBD is not dilated. SPLEEN: within normal limits. PANCREAS: No mass or ductal dilatation. ADRENALS: Unremarkable. KIDNEYS: No mass, calculus, or hydronephrosis. STOMACH: Unremarkable. VISUALIZED BOWEL: No dilatation or wall thickening. PERITONEUM: No ascites or pneumoperitoneum. RETROPERITONEUM: No lymphadenopathy or aortic aneurysm. BONES: No destructive bone lesion. ABDOMINAL WALL: No mass or hernia. ADDITIONAL COMMENTS: N/A    Impression  IMPRESSION: Indeterminate coarse calcification containing hypodense lesion in  right lobe liver has features which could be compatible with epithelioid  hemangioendothelioma or other neoplasm such as metastatic mucinous carcinoma. A  second lesion in the anterior segment 2 could reflect flash fill hemangioma or  very small lesion of same etiology. 23X    Assessment and plan   Mikala Orlando is a 80 y.o. female with a history of hyperlipidemia and arthritis who presents to neurology clinic for evaluation of cognitive impairment, her neurological exam does reveal that she has delayed recall otherwise seems unremarkable. MRI of the brain only revealed mild atrophy and mild white matter changes therefore her symptoms are likely due to an Alzheimer's type dementia. - Patient is not agreeable to having neuropsych testing done and I stated that it would unlikely  at this time therefore we can hold off on this  - Recommended that she continue with Aricept however increase the dose to 10 mg at bedtime.   We again discussed the side effects of this medication.  - As patient is doing very well on the Lexapro, we discussed weaning off the Seroquel. I did state that since she is only taking 12-1/2 at bedtime she can stop this medication. We discussed that if she does not have any changes in her mood she can stay off this medication. If she is having trouble sleeping we can try a different medication such as melatonin. - Discussed that she would benefit from designation of a POA to assist with executive decision making. Finances and medication administration should be monitored to ensure accuracy and prevent errors. - Patient currently has appropriate social/caregiver support in place. Advanced care planning has been reviewed with the patient and family.    - Extensively discussed the importance of diet and exercise and how this has been shown to delay the progression of Alzheimer's and other forms of neurodegenerative diseases. We did discuss a Mediterranean diet. - Encouraged to limit screen time and spend more time reading or doing puzzles such as crosswords or soduku      Bilateral hand numbness: Physical exam does reveal atrophy of the bilateral thenar eminence likely due to carpal tunnel syndrome.  -Decided to hold off on EMG for now however if symptoms worsen patient will consider this in the future.       Patient Active Problem List   Diagnosis Code    Liver mass R16.0    Cognitive impairment R41.89    Alzheimer's disease, unspecified G30.9        Signed By:  González Castillo MD     March 17, 2022

## 2022-03-18 PROBLEM — G30.9 ALZHEIMER'S DISEASE, UNSPECIFIED (CODE) (HCC): Status: ACTIVE | Noted: 2021-08-06

## 2022-03-18 PROBLEM — R41.89 COGNITIVE IMPAIRMENT: Status: ACTIVE | Noted: 2020-11-05

## 2022-03-19 PROBLEM — R16.0 LIVER MASS: Status: ACTIVE | Noted: 2020-11-05

## 2022-03-21 RX ORDER — ERGOCALCIFEROL 1.25 MG/1
50000 CAPSULE ORAL
Qty: 12 CAPSULE | Refills: 2 | Status: SHIPPED | OUTPATIENT
Start: 2022-03-21

## 2022-03-21 NOTE — PROGRESS NOTES
Recommend vitamin D loading dose of 50,000 iu  once a week for 12 weeks, once done with loading dose, take 2,000 iu vitamin D3 once a day over the counter to maintain level.     Normal kidney and liver function / slight elevation in calcium is minimal and close to normal will repeat at follow up in 4-6 months  Cholesterol is much improved

## 2022-03-23 NOTE — PROGRESS NOTES
Scoliosis and significant multilevel lumbosacral degenerative disc  disease without acute abnormality.   No fracture, patient has osteoarthritis in her back

## 2022-03-24 ENCOUNTER — PATIENT MESSAGE (OUTPATIENT)
Dept: INTERNAL MEDICINE CLINIC | Age: 82
End: 2022-03-24

## 2022-03-28 DIAGNOSIS — R41.3 MEMORY CHANGE: ICD-10-CM

## 2022-05-06 DIAGNOSIS — E78.00 HIGH CHOLESTEROL: ICD-10-CM

## 2022-05-06 RX ORDER — ATORVASTATIN CALCIUM 40 MG/1
TABLET, FILM COATED ORAL
Qty: 90 TABLET | Refills: 2 | Status: SHIPPED | OUTPATIENT
Start: 2022-05-06

## 2022-05-06 NOTE — TELEPHONE ENCOUNTER
Future Appointments:  No future appointments.      Last Appointment With Me:  3/14/2022     Requested Prescriptions     Pending Prescriptions Disp Refills    atorvastatin (LIPITOR) 40 mg tablet 90 Tablet 2     Sig: TAKE 1 TABLET BY MOUTH ONCE DAILY

## 2022-05-06 NOTE — TELEPHONE ENCOUNTER
----- Message from Sudhakar Yuen sent at 5/5/2022  5:42 PM EDT -----  Regarding: Pharmacy change  She just got a refill of most everything this month but were hoping to move everything over to [facility]. I think her PCP only does the Atorvastatin. I will reach out to her neurologist regarding the Lexapro and Aricept since they prescribed that.      Thank you,    [facility] [employer]

## 2022-07-30 RX ORDER — DONEPEZIL HYDROCHLORIDE 10 MG/1
TABLET, FILM COATED ORAL
Qty: 30 TABLET | Refills: 0 | Status: SHIPPED | OUTPATIENT
Start: 2022-07-30 | End: 2022-08-09 | Stop reason: SDUPTHER

## 2022-08-05 ENCOUNTER — TELEPHONE (OUTPATIENT)
Dept: NEUROLOGY | Age: 82
End: 2022-08-05

## 2022-08-05 NOTE — TELEPHONE ENCOUNTER
Voicemail:    Patient's , Daja Berumen called to let Dr know that Pt's escitalopram and donepezil still need to be sent to [facility] so pt can receive her meds. Requested call back from Nurse asap.  [phone]

## 2022-08-09 DIAGNOSIS — R41.3 MEMORY CHANGE: ICD-10-CM

## 2022-08-09 RX ORDER — ESCITALOPRAM OXALATE 10 MG/1
10 TABLET ORAL
Qty: 90 TABLET | Refills: 1 | Status: SHIPPED | OUTPATIENT
Start: 2022-08-09

## 2022-08-09 RX ORDER — DONEPEZIL HYDROCHLORIDE 10 MG/1
10 TABLET, FILM COATED ORAL
Qty: 90 TABLET | Refills: 1 | Status: SHIPPED | OUTPATIENT
Start: 2022-08-09 | End: 2022-10-29

## 2022-08-09 NOTE — TELEPHONE ENCOUNTER
Wants Rx sent to [facility] Seen by Dr. Jasmina Montes 3-  Lexapro filled 12-9-2021 to different pharmacy  Donepezil filled 7-30-22 to different pharmacy.

## 2022-08-09 NOTE — TELEPHONE ENCOUNTER
[facility] we are sending her Rx to [address] [facility]

## 2022-10-11 ENCOUNTER — TELEPHONE (OUTPATIENT)
Dept: NEUROLOGY | Age: 82
End: 2022-10-11

## 2022-10-29 DIAGNOSIS — R41.3 MEMORY CHANGE: ICD-10-CM

## 2022-10-29 RX ORDER — DONEPEZIL HYDROCHLORIDE 10 MG/1
TABLET, FILM COATED ORAL
Qty: 90 TABLET | Refills: 1 | Status: SHIPPED | OUTPATIENT
Start: 2022-10-29

## 2022-10-31 ENCOUNTER — OFFICE VISIT (OUTPATIENT)
Dept: INTERNAL MEDICINE CLINIC | Age: 82
End: 2022-10-31
Payer: MEDICARE

## 2022-10-31 VITALS
DIASTOLIC BLOOD PRESSURE: 75 MMHG | SYSTOLIC BLOOD PRESSURE: 118 MMHG | OXYGEN SATURATION: 94 % | TEMPERATURE: 97.2 F | BODY MASS INDEX: 20.83 KG/M2 | HEART RATE: 75 BPM | WEIGHT: 125 LBS | RESPIRATION RATE: 16 BRPM | HEIGHT: 65 IN

## 2022-10-31 DIAGNOSIS — R35.0 URINE FREQUENCY: Primary | ICD-10-CM

## 2022-10-31 DIAGNOSIS — M81.0 AGE-RELATED OSTEOPOROSIS WITHOUT CURRENT PATHOLOGICAL FRACTURE: ICD-10-CM

## 2022-10-31 DIAGNOSIS — N30.01 ACUTE CYSTITIS WITH HEMATURIA: ICD-10-CM

## 2022-10-31 DIAGNOSIS — R41.3 MEMORY CHANGE: ICD-10-CM

## 2022-10-31 DIAGNOSIS — G30.1 LATE ONSET ALZHEIMER'S DEMENTIA WITH BEHAVIORAL DISTURBANCE (HCC): ICD-10-CM

## 2022-10-31 DIAGNOSIS — F02.818 LATE ONSET ALZHEIMER'S DEMENTIA WITH BEHAVIORAL DISTURBANCE (HCC): ICD-10-CM

## 2022-10-31 DIAGNOSIS — F01.518 VASCULAR DEMENTIA WITH BEHAVIORAL DISTURBANCE: ICD-10-CM

## 2022-10-31 LAB
BILIRUB UR QL STRIP: NEGATIVE
GLUCOSE UR-MCNC: NORMAL MG/DL
KETONES P FAST UR STRIP-MCNC: NEGATIVE MG/DL
PH UR STRIP: 5 [PH] (ref 4.6–8)
PROT UR QL STRIP: NEGATIVE
SP GR UR STRIP: 1.02 (ref 1–1.03)
UA UROBILINOGEN AMB POC: NORMAL (ref 0.2–1)
URINALYSIS CLARITY POC: CLEAR
URINALYSIS COLOR POC: YELLOW
URINE BLOOD POC: NORMAL
URINE LEUKOCYTES POC: NORMAL
URINE NITRITES POC: NEGATIVE

## 2022-10-31 PROCEDURE — 1101F PT FALLS ASSESS-DOCD LE1/YR: CPT | Performed by: INTERNAL MEDICINE

## 2022-10-31 PROCEDURE — 81003 URINALYSIS AUTO W/O SCOPE: CPT | Performed by: INTERNAL MEDICINE

## 2022-10-31 PROCEDURE — 1090F PRES/ABSN URINE INCON ASSESS: CPT | Performed by: INTERNAL MEDICINE

## 2022-10-31 PROCEDURE — G8420 CALC BMI NORM PARAMETERS: HCPCS | Performed by: INTERNAL MEDICINE

## 2022-10-31 PROCEDURE — G0463 HOSPITAL OUTPT CLINIC VISIT: HCPCS | Performed by: INTERNAL MEDICINE

## 2022-10-31 PROCEDURE — 99214 OFFICE O/P EST MOD 30 MIN: CPT | Performed by: INTERNAL MEDICINE

## 2022-10-31 PROCEDURE — G8427 DOCREV CUR MEDS BY ELIG CLIN: HCPCS | Performed by: INTERNAL MEDICINE

## 2022-10-31 PROCEDURE — G8432 DEP SCR NOT DOC, RNG: HCPCS | Performed by: INTERNAL MEDICINE

## 2022-10-31 PROCEDURE — G8536 NO DOC ELDER MAL SCRN: HCPCS | Performed by: INTERNAL MEDICINE

## 2022-10-31 RX ORDER — GRANULES FOR ORAL 3 G/1
3 POWDER ORAL ONCE
Qty: 1 PACKET | Refills: 0 | Status: SHIPPED | OUTPATIENT
Start: 2022-10-31 | End: 2022-10-31

## 2022-10-31 RX ORDER — QUETIAPINE FUMARATE 25 MG/1
12.5 TABLET, FILM COATED ORAL
Qty: 45 TABLET | Refills: 0 | Status: SHIPPED | OUTPATIENT
Start: 2022-10-31

## 2022-10-31 NOTE — PROGRESS NOTES
1. \"Have you been to the ER, urgent care clinic since your last visit? Hospitalized since your last visit? \" No    2. \"Have you seen or consulted any other health care providers outside of the [address] [place] [address] since your last visit? \" No     3. For patients aged 39-70: Has the patient had a colonoscopy / FIT/ Cologuard? NA - based on age      If the patient is female:    4. For patients aged 41-77: Has the patient had a mammogram within the past 2 years? NA - based on age or sex      11. For patients aged 21-65: Has the patient had a pap smear?  NA - based on age or sex

## 2022-10-31 NOTE — PROGRESS NOTES
Ms. Cheryle Rothman is presenting to follow up     CC:  Urinary Frequency, Anxiety, Breathing Problem (Feels SOB at times.), and Skin Problem (Fingers on left hand)       HPI:    Daughter notes she is more confused     Notes increased frequency a bit more confused in past week  \" Questions people in the house\"  At times when feeling anxious short of breath  No dyspnea when working in the yard    Difficult to obtain history due to dementia and patient is not accompanied by family today    Chart reviewed     She has a hx hyperlipidemia and arthritis and osteoporosis and liver mass. Liver mass biopsied and benign. She was seeing  neurologist (Dr Katelin Veliz)  and evaluated for cognitive impairment, \"her neurological exam does reveal that she has delayed recall otherwise seems unremarkable. MRI of the brain only revealed mild atrophy and mild white matter changes therefore her symptoms are likely due to an Alzheimer's type dementia. \"  She is currently on aricept 10mg daily  For agitation she was on seroquel then stopped and now notes more agitation at night time    Living with  at times he is rude and hurts her feelings        Review of systems:  Constitutional: negative for fever, chills, weight loss, night sweats   10 systems reviewed and negative other then HPI      Past Medical History:   Diagnosis Date    Arthritis     bilateral hands, left knee - pt got injections    Carpal tunnel syndrome     bilateral hands    Dental infection ~1998    progressed to back of neck, pt had sx that consisted of taking bone from her right hip to replace in her spinal coloum; pt in halo for 3 months    Depression     Hypercholesterolemia     Menopause         Past Surgical History:   Procedure Laterality Date    HX CARPAL TUNNEL RELEASE Right 11/01/2018    open    HX HYSTERECTOMY      HX OOPHORECTOMY      HX TONSILLECTOMY      HX UROLOGICAL  ~1998    Back surgery:  pt reports she had dental inf. that went to back of neck and ortho surgeon took pieces of bone from right hip to replace in spinal column; pt reports she wore halo for 3 months:  Dr. Jesus Kahn (sx done at [facility])       Allergies   Allergen Reactions    Penicillins Shortness of Breath and Rash       Current Outpatient Medications on File Prior to Visit   Medication Sig Dispense Refill    donepeziL (ARICEPT) 10 mg tablet TAKE 1 TABLET BY MOUTH EVERY NIGHT 90 Tablet 1    escitalopram oxalate (LEXAPRO) 10 mg tablet Take 1 Tablet by mouth daily (with dinner). 90 Tablet 1    atorvastatin (LIPITOR) 40 mg tablet TAKE 1 TABLET BY MOUTH ONCE DAILY 90 Tablet 2    ergocalciferol (ERGOCALCIFEROL) 1,250 mcg (50,000 unit) capsule Take 1 Capsule by mouth every seven (7) days. 12 Capsule 2    aspirin delayed-release 81 mg tablet Take 1 Tab by mouth daily. (Patient taking differently: Take 81 mg by mouth as needed.) 30 Tab 11    ibuprofen (MOTRIN) 200 mg tablet Take 200 mg by mouth as needed for Pain. QUEtiapine (SEROquel) 25 mg tablet Take 0.5 Tablets by mouth nightly. (Patient not taking: Reported on 10/31/2022) 30 Tablet 0     No current facility-administered medications on file prior to visit. family history includes Cancer in her daughter; Coronary Art Dis in her brother and mother; High Cholesterol in her brother; Parkinson's Disease in her mother. Social History     Socioeconomic History    Marital status:      Spouse name: Not on file    Number of children: Not on file    Years of education: Not on file    Highest education level: Not on file   Occupational History    Not on file   Tobacco Use    Smoking status: Former     Packs/day: 1.00     Years: 20.00     Pack years: 20.00     Types: Cigarettes     Quit date:      Years since quittin.8    Smokeless tobacco: Never   Vaping Use    Vaping Use: Never used   Substance and Sexual Activity    Alcohol use:  Yes     Alcohol/week: 3.0 standard drinks     Types: 3 Glasses of wine per week     Comment: weekly Drug use: No    Sexual activity: Not Currently   Other Topics Concern    Not on file   Social History Narrative    Not on file     Social Determinants of Health     Financial Resource Strain: Not on file   Food Insecurity: Not on file   Transportation Needs: Not on file   Physical Activity: Not on file   Stress: Not on file   Social Connections: Not on file   Intimate Partner Violence: Not on file   Housing Stability: Not on file       Visit Vitals  /75   Pulse 75   Temp 97.2 °F (36.2 °C) (Temporal)   Resp 16   Ht 5' 5\" (1.651 m)   Wt 125 lb (56.7 kg)   SpO2 94%   BMI 20.80 kg/m²     General:  Well appearing female no acute distress  HEENT:   PERRL,normal conjunctiva. External ear and canals normal, TMs normal.  Hearing normal to voice. Nose without edema or discharge, normal septum. Lips, teeth, gums normal.  Oropharynx: no erythema, no exudates, no lesions, normal tongue. Neck:  Supple. Thyroid normal size, nontender, without nodules. No carotid bruit. No masses or lymphadenopathy  Respiratory: no respiratory distress,  no wheezing, no rhonchi, no rales. No chest wall tenderness. Cardiovascular:  RRR, normal S1S2, no murmur. Gastrointestinal: normal bowel sounds, soft, nontender, without masses. No hepatosplenomegaly. No CVA tenderness  Extremities +2 pulses, no edema, normal sensation   Musculoskeletal:  Normal gait. Normal digits and nails. Normal strength and tone, no atrophy, and no abnormal movement. Skin:  No rash, no lesions, no ulcers. Skin warm, normal turgor, without induration or nodules. Neuro:  A and OX4, fluent speech, cranial nerves normal 2-12.    Psych:  Normal affect      Lab Results   Component Value Date/Time    WBC 7.9 08/10/2021 03:15 PM    HGB 14.9 08/10/2021 03:15 PM    HCT 45.0 08/10/2021 03:15 PM    PLATELET 104 89/22/8357 03:15 PM    MCV 92.8 08/10/2021 03:15 PM     Lab Results   Component Value Date/Time    Sodium 138 03/14/2022 12:23 PM    Potassium 4.4 03/14/2022 12:23 PM    Chloride 106 03/14/2022 12:23 PM    CO2 28 03/14/2022 12:23 PM    Anion gap 4 (L) 03/14/2022 12:23 PM    Glucose 90 03/14/2022 12:23 PM    BUN 19 03/14/2022 12:23 PM    Creatinine 0.77 03/14/2022 12:23 PM    BUN/Creatinine ratio 25 (H) 03/14/2022 12:23 PM    GFR est AA >60 03/14/2022 12:23 PM    GFR est non-AA >60 03/14/2022 12:23 PM    Calcium 10.2 (H) 03/14/2022 12:23 PM     Lab Results   Component Value Date/Time    Cholesterol, total 199 03/14/2022 12:23 PM    HDL Cholesterol 107 03/14/2022 12:23 PM    LDL, calculated 73.2 03/14/2022 12:23 PM    VLDL, calculated 18.8 03/14/2022 12:23 PM    Triglyceride 94 03/14/2022 12:23 PM    CHOL/HDL Ratio 1.9 03/14/2022 12:23 PM     Lab Results   Component Value Date/Time    TSH, 3rd generation 1.22 09/30/2020 03:11 PM     Lab Results   Component Value Date/Time    Hemoglobin A1c 5.8 (H) 09/30/2020 03:11 PM     Lab Results   Component Value Date/Time    Vitamin D 25-Hydroxy 17.2 (L) 03/14/2022 12:23 PM                   Assessment and Plan:   1. Urine frequency  - AMB POC URINALYSIS DIP STICK AUTO W/O MICRO    2. Acute cystitis with hematuria  - fosfomycin (MONUROL) 3 gram pack oral packet; Take 1 Packet by mouth once for 1 dose. Dispense: 1 Packet; Refill: 0  - CULTURE, URINE; Future  - CULTURE, URINE    3. Memory changes: sundowning and was doing better on low dose seroquel    - QUEtiapine (SEROquel) 25 mg tablet; Take 0.5 Tablets by mouth nightly. Dispense: 45 Tablet; Refill: 0    4. Late onset Alzheimer's dementia with behavioral disturbance ([facility] [address]  Mini mental today is 25   - on aricept and will establish with new neurologist/ Dr Hola Blank no longer seen clinic patients  Resume low dose seroquel ( tolerated it well previously)   5.  Age-related osteoporosis without current pathological fracture  Does not want medication at this time  On vitamin D   Weight bearing exercise      Follow up in 3 months for dementia       Mable Park MD

## 2022-11-02 LAB
BACTERIA SPEC CULT: NORMAL
SERVICE CMNT-IMP: NORMAL

## 2022-11-28 ENCOUNTER — PATIENT MESSAGE (OUTPATIENT)
Dept: INTERNAL MEDICINE CLINIC | Age: 82
End: 2022-11-28

## 2022-12-13 ENCOUNTER — APPOINTMENT (OUTPATIENT)
Dept: GENERAL RADIOLOGY | Age: 82
DRG: 871 | End: 2022-12-13
Attending: EMERGENCY MEDICINE
Payer: MEDICARE

## 2022-12-13 ENCOUNTER — HOSPITAL ENCOUNTER (INPATIENT)
Age: 82
LOS: 1 days | Discharge: HOME | DRG: 871 | End: 2022-12-14
Attending: EMERGENCY MEDICINE | Admitting: INTERNAL MEDICINE
Payer: MEDICARE

## 2022-12-13 ENCOUNTER — APPOINTMENT (OUTPATIENT)
Dept: CT IMAGING | Age: 82
DRG: 871 | End: 2022-12-13
Attending: INTERNAL MEDICINE
Payer: MEDICARE

## 2022-12-13 DIAGNOSIS — J10.1 INFLUENZA A: ICD-10-CM

## 2022-12-13 DIAGNOSIS — J44.1 CHRONIC OBSTRUCTIVE PULMONARY DISEASE WITH ACUTE EXACERBATION (HCC): Primary | ICD-10-CM

## 2022-12-13 LAB
ALBUMIN SERPL-MCNC: 2.9 G/DL (ref 3.5–5)
ALBUMIN/GLOB SERPL: 0.7 {RATIO} (ref 1.1–2.2)
ALP SERPL-CCNC: 175 U/L (ref 45–117)
ALT SERPL-CCNC: 52 U/L (ref 12–78)
ANION GAP SERPL CALC-SCNC: 8 MMOL/L (ref 5–15)
AST SERPL-CCNC: 37 U/L (ref 15–37)
ATRIAL RATE: 69 BPM
BASOPHILS # BLD: 0 K/UL (ref 0–0.1)
BASOPHILS NFR BLD: 0 % (ref 0–1)
BILIRUB SERPL-MCNC: 0.6 MG/DL (ref 0.2–1)
BNP SERPL-MCNC: 216 PG/ML (ref 0–450)
BUN SERPL-MCNC: 9 MG/DL (ref 6–20)
BUN/CREAT SERPL: 11 (ref 12–20)
CALCIUM SERPL-MCNC: 8.4 MG/DL (ref 8.5–10.1)
CALCULATED P AXIS, ECG09: 72 DEGREES
CALCULATED R AXIS, ECG10: -30 DEGREES
CALCULATED T AXIS, ECG11: 4 DEGREES
CHLORIDE SERPL-SCNC: 101 MMOL/L (ref 97–108)
CO2 SERPL-SCNC: 31 MMOL/L (ref 21–32)
COMMENT, HOLDF: NORMAL
CREAT SERPL-MCNC: 0.81 MG/DL (ref 0.55–1.02)
DIAGNOSIS, 93000: NORMAL
DIFFERENTIAL METHOD BLD: ABNORMAL
EOSINOPHIL # BLD: 0 K/UL (ref 0–0.4)
EOSINOPHIL NFR BLD: 0 % (ref 0–7)
ERYTHROCYTE [DISTWIDTH] IN BLOOD BY AUTOMATED COUNT: 12.5 % (ref 11.5–14.5)
GLOBULIN SER CALC-MCNC: 3.9 G/DL (ref 2–4)
GLUCOSE SERPL-MCNC: 135 MG/DL (ref 65–100)
HCT VFR BLD AUTO: 42.4 % (ref 35–47)
HGB BLD-MCNC: 14 G/DL (ref 11.5–16)
IMM GRANULOCYTES # BLD AUTO: 0 K/UL (ref 0–0.04)
IMM GRANULOCYTES NFR BLD AUTO: 0 % (ref 0–0.5)
LACTATE SERPL-SCNC: 1 MMOL/L (ref 0.4–2)
LYMPHOCYTES # BLD: 1.2 K/UL (ref 0.8–3.5)
LYMPHOCYTES NFR BLD: 11 % (ref 12–49)
MCH RBC QN AUTO: 30 PG (ref 26–34)
MCHC RBC AUTO-ENTMCNC: 33 G/DL (ref 30–36.5)
MCV RBC AUTO: 90.8 FL (ref 80–99)
MONOCYTES # BLD: 0.6 K/UL (ref 0–1)
MONOCYTES NFR BLD: 6 % (ref 5–13)
NEUTS SEG # BLD: 8.6 K/UL (ref 1.8–8)
NEUTS SEG NFR BLD: 82 % (ref 32–75)
NRBC # BLD: 0 K/UL (ref 0–0.01)
NRBC BLD-RTO: 0 PER 100 WBC
P-R INTERVAL, ECG05: 154 MS
PLATELET # BLD AUTO: 238 K/UL (ref 150–400)
PMV BLD AUTO: 9.9 FL (ref 8.9–12.9)
POTASSIUM SERPL-SCNC: 3.6 MMOL/L (ref 3.5–5.1)
PROT SERPL-MCNC: 6.8 G/DL (ref 6.4–8.2)
Q-T INTERVAL, ECG07: 382 MS
QRS DURATION, ECG06: 62 MS
QTC CALCULATION (BEZET), ECG08: 409 MS
RBC # BLD AUTO: 4.67 M/UL (ref 3.8–5.2)
SAMPLES BEING HELD,HOLD: NORMAL
SODIUM SERPL-SCNC: 140 MMOL/L (ref 136–145)
TROPONIN-HIGH SENSITIVITY: 8 NG/L (ref 0–51)
VENTRICULAR RATE, ECG03: 69 BPM
WBC # BLD AUTO: 10.5 K/UL (ref 3.6–11)

## 2022-12-13 PROCEDURE — 83880 ASSAY OF NATRIURETIC PEPTIDE: CPT

## 2022-12-13 PROCEDURE — 71250 CT THORAX DX C-: CPT

## 2022-12-13 PROCEDURE — 71045 X-RAY EXAM CHEST 1 VIEW: CPT

## 2022-12-13 PROCEDURE — 99285 EMERGENCY DEPT VISIT HI MDM: CPT

## 2022-12-13 PROCEDURE — 65270000029 HC RM PRIVATE

## 2022-12-13 PROCEDURE — 84484 ASSAY OF TROPONIN QUANT: CPT

## 2022-12-13 PROCEDURE — 96374 THER/PROPH/DIAG INJ IV PUSH: CPT

## 2022-12-13 PROCEDURE — 74011000250 HC RX REV CODE- 250: Performed by: INTERNAL MEDICINE

## 2022-12-13 PROCEDURE — 83605 ASSAY OF LACTIC ACID: CPT

## 2022-12-13 PROCEDURE — 85025 COMPLETE CBC W/AUTO DIFF WBC: CPT

## 2022-12-13 PROCEDURE — U0005 INFEC AGEN DETEC AMPLI PROBE: HCPCS

## 2022-12-13 PROCEDURE — 74011250636 HC RX REV CODE- 250/636: Performed by: EMERGENCY MEDICINE

## 2022-12-13 PROCEDURE — 93005 ELECTROCARDIOGRAM TRACING: CPT

## 2022-12-13 PROCEDURE — 80053 COMPREHEN METABOLIC PANEL: CPT

## 2022-12-13 PROCEDURE — 74011250637 HC RX REV CODE- 250/637: Performed by: INTERNAL MEDICINE

## 2022-12-13 PROCEDURE — 74011250636 HC RX REV CODE- 250/636: Performed by: INTERNAL MEDICINE

## 2022-12-13 PROCEDURE — 74011000250 HC RX REV CODE- 250: Performed by: EMERGENCY MEDICINE

## 2022-12-13 PROCEDURE — 36415 COLL VENOUS BLD VENIPUNCTURE: CPT

## 2022-12-13 RX ORDER — BUDESONIDE 0.5 MG/2ML
500 INHALANT ORAL
Status: DISCONTINUED | OUTPATIENT
Start: 2022-12-13 | End: 2022-12-14 | Stop reason: HOSPADM

## 2022-12-13 RX ORDER — SODIUM CHLORIDE 0.9 % (FLUSH) 0.9 %
5-40 SYRINGE (ML) INJECTION EVERY 8 HOURS
Status: DISCONTINUED | OUTPATIENT
Start: 2022-12-13 | End: 2022-12-14 | Stop reason: HOSPADM

## 2022-12-13 RX ORDER — QUETIAPINE FUMARATE 25 MG/1
12.5 TABLET, FILM COATED ORAL
Status: DISCONTINUED | OUTPATIENT
Start: 2022-12-13 | End: 2022-12-14 | Stop reason: HOSPADM

## 2022-12-13 RX ORDER — SODIUM CHLORIDE 9 MG/ML
75 INJECTION, SOLUTION INTRAVENOUS CONTINUOUS
Status: DISCONTINUED | OUTPATIENT
Start: 2022-12-13 | End: 2022-12-14 | Stop reason: HOSPADM

## 2022-12-13 RX ORDER — ATORVASTATIN CALCIUM 20 MG/1
40 TABLET, FILM COATED ORAL
Status: DISCONTINUED | OUTPATIENT
Start: 2022-12-13 | End: 2022-12-14 | Stop reason: HOSPADM

## 2022-12-13 RX ORDER — ENOXAPARIN SODIUM 100 MG/ML
40 INJECTION SUBCUTANEOUS DAILY
Status: DISCONTINUED | OUTPATIENT
Start: 2022-12-14 | End: 2022-12-14 | Stop reason: HOSPADM

## 2022-12-13 RX ORDER — ACETAMINOPHEN 325 MG/1
650 TABLET ORAL
Status: DISCONTINUED | OUTPATIENT
Start: 2022-12-13 | End: 2022-12-14 | Stop reason: HOSPADM

## 2022-12-13 RX ORDER — POLYETHYLENE GLYCOL 3350 17 G/17G
17 POWDER, FOR SOLUTION ORAL DAILY PRN
Status: DISCONTINUED | OUTPATIENT
Start: 2022-12-13 | End: 2022-12-14 | Stop reason: HOSPADM

## 2022-12-13 RX ORDER — AZITHROMYCIN 250 MG/1
500 TABLET, FILM COATED ORAL DAILY
Status: DISCONTINUED | OUTPATIENT
Start: 2022-12-13 | End: 2022-12-14 | Stop reason: HOSPADM

## 2022-12-13 RX ORDER — ENOXAPARIN SODIUM 100 MG/ML
40 INJECTION SUBCUTANEOUS DAILY
Status: DISCONTINUED | OUTPATIENT
Start: 2022-12-13 | End: 2022-12-13

## 2022-12-13 RX ORDER — IPRATROPIUM BROMIDE AND ALBUTEROL SULFATE 2.5; .5 MG/3ML; MG/3ML
3 SOLUTION RESPIRATORY (INHALATION)
Status: DISCONTINUED | OUTPATIENT
Start: 2022-12-13 | End: 2022-12-14 | Stop reason: HOSPADM

## 2022-12-13 RX ORDER — ACETAMINOPHEN 650 MG/1
650 SUPPOSITORY RECTAL
Status: DISCONTINUED | OUTPATIENT
Start: 2022-12-13 | End: 2022-12-14 | Stop reason: HOSPADM

## 2022-12-13 RX ORDER — ASPIRIN 81 MG/1
81 TABLET ORAL DAILY
Status: DISCONTINUED | OUTPATIENT
Start: 2022-12-14 | End: 2022-12-14

## 2022-12-13 RX ORDER — ESCITALOPRAM OXALATE 10 MG/1
10 TABLET ORAL
Status: DISCONTINUED | OUTPATIENT
Start: 2022-12-13 | End: 2022-12-13

## 2022-12-13 RX ORDER — ONDANSETRON 4 MG/1
4 TABLET, ORALLY DISINTEGRATING ORAL
Status: DISCONTINUED | OUTPATIENT
Start: 2022-12-13 | End: 2022-12-14 | Stop reason: HOSPADM

## 2022-12-13 RX ORDER — ONDANSETRON 2 MG/ML
4 INJECTION INTRAMUSCULAR; INTRAVENOUS
Status: DISCONTINUED | OUTPATIENT
Start: 2022-12-13 | End: 2022-12-14 | Stop reason: HOSPADM

## 2022-12-13 RX ORDER — DONEPEZIL HYDROCHLORIDE 5 MG/1
10 TABLET, FILM COATED ORAL
Status: DISCONTINUED | OUTPATIENT
Start: 2022-12-13 | End: 2022-12-14 | Stop reason: HOSPADM

## 2022-12-13 RX ORDER — ESCITALOPRAM OXALATE 10 MG/1
10 TABLET ORAL
Status: DISCONTINUED | OUTPATIENT
Start: 2022-12-13 | End: 2022-12-14 | Stop reason: HOSPADM

## 2022-12-13 RX ORDER — SODIUM CHLORIDE 0.9 % (FLUSH) 0.9 %
5-40 SYRINGE (ML) INJECTION AS NEEDED
Status: DISCONTINUED | OUTPATIENT
Start: 2022-12-13 | End: 2022-12-14 | Stop reason: HOSPADM

## 2022-12-13 RX ORDER — OSELTAMIVIR PHOSPHATE 75 MG/1
75 CAPSULE ORAL ONCE
Status: COMPLETED | OUTPATIENT
Start: 2022-12-13 | End: 2022-12-13

## 2022-12-13 RX ADMIN — SODIUM CHLORIDE 1000 ML: 9 INJECTION, SOLUTION INTRAVENOUS at 12:08

## 2022-12-13 RX ADMIN — ALBUTEROL SULFATE 1 DOSE: 2.5 SOLUTION RESPIRATORY (INHALATION) at 12:08

## 2022-12-13 RX ADMIN — QUETIAPINE FUMARATE 12.5 MG: 25 TABLET ORAL at 22:02

## 2022-12-13 RX ADMIN — SODIUM CHLORIDE, PRESERVATIVE FREE 10 ML: 5 INJECTION INTRAVENOUS at 15:06

## 2022-12-13 RX ADMIN — ALBUTEROL SULFATE 1 DOSE: 2.5 SOLUTION RESPIRATORY (INHALATION) at 13:34

## 2022-12-13 RX ADMIN — OSELTAMIVIR PHOSPHATE 75 MG: 75 CAPSULE ORAL at 21:38

## 2022-12-13 RX ADMIN — ESCITALOPRAM OXALATE 10 MG: 10 TABLET ORAL at 21:37

## 2022-12-13 RX ADMIN — METHYLPREDNISOLONE SODIUM SUCCINATE 125 MG: 125 INJECTION, POWDER, FOR SOLUTION INTRAMUSCULAR; INTRAVENOUS at 15:06

## 2022-12-13 RX ADMIN — ACETAMINOPHEN 650 MG: 325 TABLET ORAL at 15:09

## 2022-12-13 RX ADMIN — ATORVASTATIN CALCIUM 40 MG: 20 TABLET, FILM COATED ORAL at 22:03

## 2022-12-13 RX ADMIN — CEFTRIAXONE 1 G: 1 INJECTION, POWDER, FOR SOLUTION INTRAMUSCULAR; INTRAVENOUS at 21:38

## 2022-12-13 RX ADMIN — DONEPEZIL HYDROCHLORIDE 10 MG: 5 TABLET, FILM COATED ORAL at 21:37

## 2022-12-13 RX ADMIN — ALBUTEROL SULFATE 1 DOSE: 2.5 SOLUTION RESPIRATORY (INHALATION) at 13:35

## 2022-12-13 RX ADMIN — AZITHROMYCIN MONOHYDRATE 500 MG: 250 TABLET ORAL at 15:06

## 2022-12-13 RX ADMIN — METHYLPREDNISOLONE SODIUM SUCCINATE 125 MG: 125 INJECTION, POWDER, FOR SOLUTION INTRAMUSCULAR; INTRAVENOUS at 12:08

## 2022-12-13 RX ADMIN — ONDANSETRON 4 MG: 2 INJECTION INTRAMUSCULAR; INTRAVENOUS at 15:48

## 2022-12-13 RX ADMIN — SODIUM CHLORIDE 75 ML/HR: 9 INJECTION, SOLUTION INTRAVENOUS at 21:40

## 2022-12-13 NOTE — H&P
Hospitalist Admission Note    NAME: Jen Franco   :  1940   MRN:  [id number]     Date/Time:  2022 1:53 PM    Patient PCP: Jonn Montalvo MD  ______________________________________  Chief complaint on presentation:  Worsening shortness of breath with cough for the last 5 days despite being treated with Tamiflu for influenza infection. History of present illness:  Patient is a very pleasant [de-identified]years old  female with past medical history significant for mild cognitive deficits, anxiety/depression, dyslipidemia who was just diagnosed with flu and then being treated with Tamiflu by the PCP, today being the last day for Tamiflu brought to the emergency room by the daughter for worsening generalized weakness, shortness of breath and cough productive of yellowish sputum without any associated fever or chills, nausea or vomiting. On arrival to the emergency room patient was noted to be mildly febrile with a temp of 99.2, tachycardia with heart rate up to 98, tachypneic with respiratory rate 28 and borderline hypotension with initial blood pressure 93/49 with an SPO2 of 88% on room air. Initial chest x-ray was reported to be negative for any acute infiltrates or effusion. Labs including CBC showed borderline leukocytosis at 10.5 with 81% neutrophils while Serum chemistry was grossly within normal limits. In the emergency room patient was treated with bronchodilators with improvement in breathing and is being admitted to the floor for further work-up and management.     Past Medical History:   Diagnosis Date    Arthritis     bilateral hands, left knee - pt got injections    Carpal tunnel syndrome     bilateral hands    Dental infection ~    progressed to back of neck, pt had sx that consisted of taking bone from her right hip to replace in her spinal coloum; pt in halo for 3 months    Depression     Hypercholesterolemia     Menopause       Past Surgical History:   Procedure Laterality Date    HX CARPAL TUNNEL RELEASE Right 2018    open    HX HYSTERECTOMY      HX OOPHORECTOMY      HX TONSILLECTOMY      HX UROLOGICAL  ~    Back surgery:  pt reports she had dental inf. that went to back of neck and ortho surgeon took pieces of bone from right hip to replace in spinal column; pt reports she wore halo for 3 months:  Dr. Maxx Gonsalez (sx done at [address] [place] [address])     Social History     Tobacco Use    Smoking status: Former     Packs/day: 1.00     Years: 20.00     Pack years: 20.00     Types: Cigarettes     Quit date:      Years since quittin.9    Smokeless tobacco: Never   Substance Use Topics    Alcohol use: Yes     Alcohol/week: 3.0 standard drinks     Types: 3 Glasses of wine per week     Comment: weekly      Family History   Problem Relation Age of Onset    Cancer Daughter     Parkinson's Disease Mother     Coronary Art Dis Mother     Coronary Art Dis Brother     High Cholesterol Brother        Allergies   Allergen Reactions    Penicillins Shortness of Breath and Rash        Prior to Admission medications    Medication Sig Start Date End Date Taking? Authorizing Provider   QUEtiapine (SEROquel) 25 mg tablet Take 0.5 Tablets by mouth nightly. 10/31/22   Travis Becerril MD   donepeziL (ARICEPT) 10 mg tablet TAKE 1 TABLET BY MOUTH EVERY NIGHT 10/29/22   David Payan MD   escitalopram oxalate (LEXAPRO) 10 mg tablet Take 1 Tablet by mouth daily (with dinner). 22   Silvana Dior MD   atorvastatin (LIPITOR) 40 mg tablet TAKE 1 TABLET BY MOUTH ONCE DAILY 22   Travis Becerril MD   ergocalciferol (ERGOCALCIFEROL) 1,250 mcg (50,000 unit) capsule Take 1 Capsule by mouth every seven (7) days. 3/21/22   Travis Becerril MD   aspirin delayed-release 81 mg tablet Take 1 Tab by mouth daily. Patient taking differently: Take 81 mg by mouth as needed. 20   Lit DE ANDA, JANELL   ibuprofen (MOTRIN) 200 mg tablet Take 200 mg by mouth as needed for Pain.     Provider, Historical     REVIEW OF SYSTEMS:  See HPI for details  General: negative for fever, chills, positive for generalized weakness  Eyes: negative for blurred vision, eye pain, loss of vision, diplopia  Ear Nose and Throat: negative for rhinorrhea, pharyngitis, otalgia, tinnitus, speech or swallowing difficulties  Respiratory:  negative for pleuritic pain, positive for cough, yellowish sputum production, shortness of breath and mild wheezing   Cardiology:  negative for chest pain, palpitations, orthopnea, PND, edema, syncope   Gastrointestinal: negative for abdominal pain, N/V, dysphagia, change in bowel habits, bleeding  Genitourinary: negative for frequency, urgency, dysuria, hematuria, incontinence  Muskuloskeletal : negative for arthralgia, myalgia  Hematology: negative for easy bruising, bleeding, lymphadenopathy  Dermatological: negative for rash, ulceration, mole change, new lesion  Endocrine: negative for hot flashes or polydipsia  Neurological: negative for headache, dizziness, confusion, focal weakness, paresthesia, memory loss, gait disturbance  Psychological: Positive for anxiety, depression,     Visit Vitals  /64 (BP 1 Location: Left upper arm, BP Patient Position: At rest)   Pulse 72   Temp 98.8 °F (37.1 °C)   Resp 16   Ht 5' 3\" (1.6 m)   Wt 56.7 kg (125 lb)   SpO2 93%   BMI 22.14 kg/m²       Physical Exam:    Gen: Well-developed, well-nourished, in no acute distress  HEENT:  Pink conjunctivae, PERRL, hearing intact to voice, moist mucous membranes  Neck: Supple, without masses, thyroid non-tender  Resp: No accessory muscle use, mild expiratory wheezing and right lower lobe rhonchi.   Card: No murmurs, normal S1, S2 without thrills, bruits or peripheral edema  Abd:  Soft, non-tender, non-distended, normoactive bowel sounds are present, no palpable organomegaly and no detectable hernias  Lymph:  No cervical or inguinal adenopathy  Musc: No cyanosis or clubbing  Skin: No rashes or ulcers, skin turgor is good  Neuro:  Cranial nerves are grossly intact, no focal motor weakness, follows commands appropriately  Psych:  Good insight, oriented to person, place and time, alert    LAB DATA REVIEWED:    Recent Results (from the past 24 hour(s))   SAMPLES BEING HELD    Collection Time: 12/13/22 10:46 AM   Result Value Ref Range    SAMPLES BEING HELD SST     COMMENT        Add-on orders for these samples will be processed based on acceptable specimen integrity and analyte stability, which may vary by analyte. CBC WITH AUTOMATED DIFF    Collection Time: 12/13/22 10:46 AM   Result Value Ref Range    WBC 10.5 3.6 - 11.0 K/uL    RBC 4.67 3.80 - 5.20 M/uL    HGB 14.0 11.5 - 16.0 g/dL    HCT 42.4 35.0 - 47.0 %    MCV 90.8 80.0 - 99.0 FL    MCH 30.0 26.0 - 34.0 PG    MCHC 33.0 30.0 - 36.5 g/dL    RDW 12.5 11.5 - 14.5 %    PLATELET 365 448 - 081 K/uL    MPV 9.9 8.9 - 12.9 FL    NRBC 0.0 0.0  WBC    ABSOLUTE NRBC 0.00 0.00 - 0.01 K/uL    NEUTROPHILS 82 (H) 32 - 75 %    LYMPHOCYTES 11 (L) 12 - 49 %    MONOCYTES 6 5 - 13 %    EOSINOPHILS 0 0 - 7 %    BASOPHILS 0 0 - 1 %    IMMATURE GRANULOCYTES 0 0 - 0.5 %    ABS. NEUTROPHILS 8.6 (H) 1.8 - 8.0 K/UL    ABS. LYMPHOCYTES 1.2 0.8 - 3.5 K/UL    ABS. MONOCYTES 0.6 0.0 - 1.0 K/UL    ABS. EOSINOPHILS 0.0 0.0 - 0.4 K/UL    ABS. BASOPHILS 0.0 0.0 - 0.1 K/UL    ABS. IMM.  GRANS. 0.0 0.00 - 0.04 K/UL    DF AUTOMATED     TROPONIN-HIGH SENSITIVITY    Collection Time: 12/13/22 10:46 AM   Result Value Ref Range    Troponin-High Sensitivity 8 0 - 51 ng/L   NT-PRO BNP    Collection Time: 12/13/22 10:46 AM   Result Value Ref Range    NT pro- 0 - 148 PG/ML   METABOLIC PANEL, COMPREHENSIVE    Collection Time: 12/13/22 10:46 AM   Result Value Ref Range    Sodium 140 136 - 145 mmol/L    Potassium 3.6 3.5 - 5.1 mmol/L    Chloride 101 97 - 108 mmol/L    CO2 31 21 - 32 mmol/L    Anion gap 8 5 - 15 mmol/L    Glucose 135 (H) 65 - 100 mg/dL    BUN 9 6 - 20 MG/DL    Creatinine 0.81 0.55 - 1.02 MG/DL BUN/Creatinine ratio 11 (L) 12 - 20      eGFR >60 >60 ml/min/1.73m2    Calcium 8.4 (L) 8.5 - 10.1 MG/DL    Bilirubin, total 0.6 0.2 - 1.0 MG/DL    ALT (SGPT) 52 12 - 78 U/L    AST (SGOT) 37 15 - 37 U/L    Alk. phosphatase 175 (H) 45 - 117 U/L    Protein, total 6.8 6.4 - 8.2 g/dL    Albumin 2.9 (L) 3.5 - 5.0 g/dL    Globulin 3.9 2.0 - 4.0 g/dL    A-G Ratio 0.7 (L) 1.1 - 2.2     LACTIC ACID    Collection Time: 12/13/22 11:27 AM   Result Value Ref Range    Lactic acid 1.0 0.4 - 2.0 MMOL/L     12/13/22 1101  XR CHEST PORT   Performed: 12/13/22 1057  Final        Impression:  No acute process or change compared to the prior exam.       Assessment:  Acute hypoxic respiratory failure requiring supplemental oxygen. Likely superimposed bacterial pneumonia with recent diagnosis of influenza. Sepsis associated with above; (tachycardia, tachypnea, hypoxia, borderline hypotension). Anxiety/depression. Mild cognitive deficit. Dyslipidemia. Plans:  Patient being admitted to the floor for continued supplemental oxygen along with bronchodilators as needed while empirically starting patient on IV Rocephin and Zithromax while awaiting CT of the chest, already ordered. Will give the last dose of Tamiflu as due today. Start IV fluids. Resume patient home medication including Seroquel, Lexapro, get PT/OT evaluation in the morning. Start DVT prophylaxis with Lovenox. Follow-up CT chest along with CBC and BMP in the morning.

## 2022-12-13 NOTE — ED NOTES
[facility]. Full set of vital signs obtained, reflected in emar. Family at bedside. Admitting hospitalist contacted.

## 2022-12-13 NOTE — ED PROVIDER NOTES
HPI   The patient is a an [de-identified] white female diagnosed with influenza about 5 days ago on her last day of Tamiflu today who comes complaining of continuing cough congestion some shortness of breath on exertion some urinary incontinence and urgency. Her pulse ox here is about 94% on room air she has some mild wheezing bilaterally though she states she is never had COPD. She denies any chest pain nausea vomiting diarrhea or any significant fever.   Past Medical History:   Diagnosis Date    Arthritis     bilateral hands, left knee - pt got injections    Carpal tunnel syndrome     bilateral hands    Dental infection ~    progressed to back of neck, pt had sx that consisted of taking bone from her right hip to replace in her spinal coloum; pt in halo for 3 months    Depression     Hypercholesterolemia     Menopause        Past Surgical History:   Procedure Laterality Date    HX CARPAL TUNNEL RELEASE Right 2018    open    HX HYSTERECTOMY      HX OOPHORECTOMY      HX TONSILLECTOMY      HX UROLOGICAL  ~    Back surgery:  pt reports she had dental inf. that went to back of neck and ortho surgeon took pieces of bone from right hip to replace in spinal column; pt reports she wore halo for 3 months:  Dr. Silvestre Harmon (sx done at [facility])         Family History:   Problem Relation Age of Onset    Cancer Daughter     Parkinson's Disease Mother     Coronary Art Dis Mother     Coronary Art Dis Brother     High Cholesterol Brother        Social History     Socioeconomic History    Marital status:      Spouse name: Not on file    Number of children: Not on file    Years of education: Not on file    Highest education level: Not on file   Occupational History    Not on file   Tobacco Use    Smoking status: Former     Packs/day: 1.00     Years: 20.00     Pack years: 20.00     Types: Cigarettes     Quit date:      Years since quittin.9    Smokeless tobacco: Never   Vaping Use    Vaping Use: Never used Substance and Sexual Activity    Alcohol use: Yes     Alcohol/week: 3.0 standard drinks     Types: 3 Glasses of wine per week     Comment: weekly    Drug use: No    Sexual activity: Not Currently   Other Topics Concern    Not on file   Social History Narrative    Not on file     Social Determinants of Health     Financial Resource Strain: Not on file   Food Insecurity: Not on file   Transportation Needs: Not on file   Physical Activity: Not on file   Stress: Not on file   Social Connections: Not on file   Intimate Partner Violence: Not on file   Housing Stability: Not on file         ALLERGIES: Penicillins    Review of Systems   All other systems reviewed and are negative. Vitals:    12/13/22 1039 12/13/22 1040   BP: 133/64    Pulse: 72    Resp: 16    Temp: 98.8 °F (37.1 °C)    SpO2: 93%    Weight:  56.7 kg (125 lb)   Height:  5' 3\" (1.6 m)            Physical Exam  Vitals and nursing note reviewed. Constitutional:       Appearance: She is well-developed. HENT:      Head: Normocephalic and atraumatic. Mouth/Throat:      Pharynx: No oropharyngeal exudate. Eyes:      General: No scleral icterus. Conjunctiva/sclera: Conjunctivae normal.   Neck:      Thyroid: No thyromegaly. Cardiovascular:      Rate and Rhythm: Normal rate and regular rhythm. Heart sounds: Normal heart sounds. No murmur heard. No friction rub. No gallop. Pulmonary:      Effort: Pulmonary effort is normal. No respiratory distress. Breath sounds: Normal breath sounds. No stridor. No wheezing or rales. Comments: BILAT MILD EXP WHEEZING  Abdominal:      General: Bowel sounds are normal.      Palpations: Abdomen is soft. Tenderness: There is no abdominal tenderness. There is no guarding or rebound. Musculoskeletal:         General: Normal range of motion. Cervical back: Neck supple. Lymphadenopathy:      Cervical: No cervical adenopathy. Skin:     General: Skin is warm and dry.    Neurological: Mental Status: She is alert and oriented to person, place, and time. MDM     Amount and/or Complexity of Data Reviewed  Clinical lab tests: ordered and reviewed  Tests in the radiology section of CPT®: ordered and reviewed  Tests in the medicine section of CPT®: ordered and reviewed    Risk of Complications, Morbidity, and/or Mortality  Presenting problems: moderate  Diagnostic procedures: high  Management options: moderate           Procedures      Assessment and plan        the patient has acute wheezing associated with acute influenza have given albuterol and Solu-Medrol the patient's wheezing that has not improved and she is becoming slightly hypoxic may need some oxygen will admit to hospitalist service. Perfect Serve Consult for Admission  1:28 PM    ED Room Number: WER04/04  Patient Name and age:  Neno De La Rosa [de-identified] y.o.  female  Working Diagnosis:   1. Chronic obstructive pulmonary disease with acute exacerbation ([address]    2. Influenza A        COVID-19 Suspicion:  no  Sepsis present:  no  Reassessment needed: yes  Code Status:  Full Code  Readmission: no  Isolation Requirements:  yes  Recommended Level of Care:  telemetry  Department:[place] [facility] - [facility]  Other:           ED EKG interpretation:  Rhythm: nsr rate 69  nsst changes nl axis. This EKG was interpreted by Alyssa Ayala MD,ED Provider.      Total critical care time spent exclusive of procedures:34  minutes

## 2022-12-13 NOTE — ED NOTES
TRANSFER - OUT REPORT:    Verbal report given to Katelyn Michi on [address]  being transferred to [facility] ED for routine progression of care       Report consisted of patients Situation, Background, Assessment and   Recommendations(SBAR). Information from the following report(s) ED Summary, MAR, Recent Results, and Cardiac Rhythm NSR  was reviewed with the receiving nurse. Lines:   Peripheral IV 12/13/22 Right Antecubital (Active)        Opportunity for questions and clarification was provided.       Patient transported with:   Monitor  O2 @ 2 liters

## 2022-12-13 NOTE — ED NOTES
Pt reports feeling hot and nauseated. Temp WNL. Pt medicated w/4mg IV Zofran at this time. Granddaughter at [address] [facility].

## 2022-12-13 NOTE — ED TRIAGE NOTES
Pt  c/o cough, congestion, shortness of breath on exertion, urinary incontinence,urgency, (denied hematuria, frequency) decreased appetite, dizzy spells at night when she wakes up, rapid heart beat at times; dx with flu a week ago, last day of tamiflu. Denied Cp, N/v/d.  And fever

## 2022-12-14 VITALS
TEMPERATURE: 97.6 F | BODY MASS INDEX: 22.15 KG/M2 | DIASTOLIC BLOOD PRESSURE: 70 MMHG | RESPIRATION RATE: 14 BRPM | SYSTOLIC BLOOD PRESSURE: 130 MMHG | OXYGEN SATURATION: 95 % | WEIGHT: 125 LBS | HEIGHT: 63 IN | HEART RATE: 62 BPM

## 2022-12-14 LAB
ANION GAP SERPL CALC-SCNC: 9 MMOL/L (ref 5–15)
BASOPHILS # BLD: 0 K/UL (ref 0–0.1)
BASOPHILS NFR BLD: 0 % (ref 0–1)
BUN SERPL-MCNC: 12 MG/DL (ref 6–20)
BUN/CREAT SERPL: 16 (ref 12–20)
CALCIUM SERPL-MCNC: 8.4 MG/DL (ref 8.5–10.1)
CHLORIDE SERPL-SCNC: 108 MMOL/L (ref 97–108)
CO2 SERPL-SCNC: 25 MMOL/L (ref 21–32)
COMMENT, HOLDF: NORMAL
CREAT SERPL-MCNC: 0.73 MG/DL (ref 0.55–1.02)
DIFFERENTIAL METHOD BLD: ABNORMAL
EOSINOPHIL # BLD: 0 K/UL (ref 0–0.4)
EOSINOPHIL NFR BLD: 0 % (ref 0–7)
ERYTHROCYTE [DISTWIDTH] IN BLOOD BY AUTOMATED COUNT: 12.7 % (ref 11.5–14.5)
GLUCOSE SERPL-MCNC: 181 MG/DL (ref 65–100)
HCT VFR BLD AUTO: 37.9 % (ref 35–47)
HGB BLD-MCNC: 12.7 G/DL (ref 11.5–16)
IMM GRANULOCYTES # BLD AUTO: 0 K/UL (ref 0–0.04)
IMM GRANULOCYTES NFR BLD AUTO: 0 % (ref 0–0.5)
LYMPHOCYTES # BLD: 0.8 K/UL (ref 0.8–3.5)
LYMPHOCYTES NFR BLD: 10 % (ref 12–49)
MAGNESIUM SERPL-MCNC: 2 MG/DL (ref 1.6–2.4)
MCH RBC QN AUTO: 29.8 PG (ref 26–34)
MCHC RBC AUTO-ENTMCNC: 33.5 G/DL (ref 30–36.5)
MCV RBC AUTO: 89 FL (ref 80–99)
MONOCYTES # BLD: 0.1 K/UL (ref 0–1)
MONOCYTES NFR BLD: 2 % (ref 5–13)
NEUTS SEG # BLD: 7.5 K/UL (ref 1.8–8)
NEUTS SEG NFR BLD: 88 % (ref 32–75)
NRBC # BLD: 0 K/UL (ref 0–0.01)
NRBC BLD-RTO: 0 PER 100 WBC
PLATELET # BLD AUTO: 241 K/UL (ref 150–400)
PMV BLD AUTO: 9.9 FL (ref 8.9–12.9)
POTASSIUM SERPL-SCNC: 3.4 MMOL/L (ref 3.5–5.1)
RBC # BLD AUTO: 4.26 M/UL (ref 3.8–5.2)
SAMPLES BEING HELD,HOLD: NORMAL
SARS-COV-2, XPLCVT: NOT DETECTED
SODIUM SERPL-SCNC: 142 MMOL/L (ref 136–145)
SOURCE, COVRS: NORMAL
UR CULT HOLD, URHOLD: NORMAL
WBC # BLD AUTO: 8.4 K/UL (ref 3.6–11)

## 2022-12-14 PROCEDURE — 87899 AGENT NOS ASSAY W/OPTIC: CPT

## 2022-12-14 PROCEDURE — 85025 COMPLETE CBC W/AUTO DIFF WBC: CPT

## 2022-12-14 PROCEDURE — 83735 ASSAY OF MAGNESIUM: CPT

## 2022-12-14 PROCEDURE — 97116 GAIT TRAINING THERAPY: CPT

## 2022-12-14 PROCEDURE — 36415 COLL VENOUS BLD VENIPUNCTURE: CPT

## 2022-12-14 PROCEDURE — 77010033678 HC OXYGEN DAILY

## 2022-12-14 PROCEDURE — 74011250637 HC RX REV CODE- 250/637: Performed by: INTERNAL MEDICINE

## 2022-12-14 PROCEDURE — 97161 PT EVAL LOW COMPLEX 20 MIN: CPT

## 2022-12-14 PROCEDURE — 94761 N-INVAS EAR/PLS OXIMETRY MLT: CPT

## 2022-12-14 PROCEDURE — 80048 BASIC METABOLIC PNL TOTAL CA: CPT

## 2022-12-14 PROCEDURE — 74011250636 HC RX REV CODE- 250/636: Performed by: INTERNAL MEDICINE

## 2022-12-14 PROCEDURE — 74011000250 HC RX REV CODE- 250: Performed by: INTERNAL MEDICINE

## 2022-12-14 RX ORDER — LEVOFLOXACIN 750 MG/1
750 TABLET ORAL DAILY
Qty: 5 TABLET | Refills: 0 | Status: SHIPPED | OUTPATIENT
Start: 2022-12-14

## 2022-12-14 RX ORDER — FLUTICASONE PROPIONATE 50 MCG
2 SPRAY, SUSPENSION (ML) NASAL
Qty: 1 EACH | Refills: 0 | Status: SHIPPED | OUTPATIENT
Start: 2022-12-14 | End: 2022-12-14 | Stop reason: SDUPTHER

## 2022-12-14 RX ORDER — POTASSIUM CHLORIDE 750 MG/1
40 TABLET, FILM COATED, EXTENDED RELEASE ORAL
Status: COMPLETED | OUTPATIENT
Start: 2022-12-14 | End: 2022-12-14

## 2022-12-14 RX ORDER — AZITHROMYCIN 500 MG/1
500 TABLET, FILM COATED ORAL DAILY
Qty: 4 TABLET | Refills: 0 | Status: SHIPPED | OUTPATIENT
Start: 2022-12-14 | End: 2022-12-14

## 2022-12-14 RX ORDER — AMOXICILLIN AND CLAVULANATE POTASSIUM 875; 125 MG/1; MG/1
1 TABLET, FILM COATED ORAL 2 TIMES DAILY
Qty: 14 TABLET | Refills: 0 | Status: SHIPPED | OUTPATIENT
Start: 2022-12-14 | End: 2022-12-14

## 2022-12-14 RX ORDER — FLUTICASONE PROPIONATE 50 MCG
2 SPRAY, SUSPENSION (ML) NASAL
Qty: 1 EACH | Refills: 0 | Status: SHIPPED | OUTPATIENT
Start: 2022-12-14

## 2022-12-14 RX ADMIN — SODIUM CHLORIDE 75 ML/HR: 9 INJECTION, SOLUTION INTRAVENOUS at 11:11

## 2022-12-14 RX ADMIN — AZITHROMYCIN MONOHYDRATE 500 MG: 250 TABLET ORAL at 08:50

## 2022-12-14 RX ADMIN — ENOXAPARIN SODIUM 40 MG: 100 INJECTION SUBCUTANEOUS at 08:50

## 2022-12-14 RX ADMIN — SODIUM CHLORIDE, PRESERVATIVE FREE 10 ML: 5 INJECTION INTRAVENOUS at 08:51

## 2022-12-14 RX ADMIN — POTASSIUM CHLORIDE 40 MEQ: 750 TABLET, FILM COATED, EXTENDED RELEASE ORAL at 08:49

## 2022-12-14 NOTE — PROGRESS NOTES
On call  notified that patient is on tele monitor and HR at sleep has dipped to the mid 40's and sometimes low 40's and upper 30's;   at rest she is in the 60's; she seems to be asymptomatic and awakens easily  from sleep and her O2 sats have been within parameter ; just wanted to note that at sleep she is alexx in the 40's

## 2022-12-14 NOTE — PROGRESS NOTES
Reason for Admission:  increased weakness and cough. Past Medical History:   Diagnosis Date    Arthritis     bilateral hands, left knee - pt got injections    Carpal tunnel syndrome     bilateral hands    Dental infection ~1998    progressed to back of neck, pt had sx that consisted of taking bone from her right hip to replace in her spinal coloum; pt in halo for 3 months    Depression     Hypercholesterolemia     Menopause      COVID19 Vaccine:  yes       type:  Prieto & Prieto  x 1                    RUR Score:      8%/ low risk               Plan for utilizing home health:     none at this time, no DME. PCP: First and Last name:  Verito Us MD     Name of Practice:    Are you a current patient: Yes/No:  yes   Approximate date of last visit:  1 month ago   Can you participate in a virtual visit with your PCP:  yes                     Current Advanced Directive/Advance Care Plan: Full Code      Healthcare Decision Maker:   Click here to complete [place] including selection of the Healthcare Decision Maker Relationship (ie \"Primary\")             Primary Decision Maker: [facility] - [phone]  Daughter Enma Martinez, [phone]                  Transition of Care Plan:       Chart reviewed, demographics verified. CM role and follow up discussed. CM assessment completed with patient and her daughter Skip Vega. Patient is currently  from her  and will stay with her daughter at discharge at: [address] [place] [address]. Patient lives in a one story home with 3 steps to enter home. Patient has prescription drug coverage, uses [facility] on [address]. Patient is independent, drives, and provides self care. Patient performs ADLs independently. Current status:  Patient currently requiring medical management including ongoing assessment and monitoring.  Continues on oxygen at 2L/NC, IV antibiotics, nebs, and IV solumedrol. Droplet Plus isolation, R/O COVID-19. Await PT evaluation. PLAN:  1. Monitor patient response to treatment and recommendations. 2. Medical management continues. 3. CM needs identified:  unsure at this time  4. Await PT evaluation. 5. Will monitor oxygenation needs. 6. Patient transport home per william Smith at discharge. 7. CM to monitor clinical progress and disposition recommendations. Care Management Interventions  PCP Verified by CM: Yes (Dr. Tamayo Shoulder)  Mode of Transport at Discharge:  Other (see comment) (per daughter Rehana Smith)  Transition of Care Consult (CM Consult): Discharge Planning  Support Systems: Child(sarah), Other Family Member(s)  Confirm Follow Up Transport: Family  The Plan for Transition of Care is Related to the Following Treatment Goals : Return home  Discharge Location  Patient Expects to be Discharged to[de-identified] Home with family assistance    Heather Ruiz, RN, MSN, Care manager

## 2022-12-14 NOTE — PROGRESS NOTES
TRANSFER - IN REPORT:    Verbal report received from Monse(name) on Clover Cosby  being received from ER(unit) for routine progression of care      Report consisted of patients Situation, Background, Assessment and   Recommendations(SBAR). Information from the following report(s) SBAR, Kardex, ED Summary, Intake/Output, Recent Results, and Cardiac Rhythm SR  was reviewed with the receiving nurse. Opportunity for questions and clarification was provided. Assessment completed upon patients arrival to unit and care assumed.        Patient on 2L NC

## 2022-12-14 NOTE — ED NOTES
Report called to  [facility] on  [facility]. They are ready for her now.

## 2022-12-14 NOTE — DISCHARGE INSTRUCTIONS
HOSPITALIST DISCHARGE INSTRUCTIONS  NAME: Siobhan Thompson   :  1940   MRN:  [id number]     Date/Time:  2022 3:58 PM    ADMIT DATE: 2022     DISCHARGE DATE: 2022     ADMITTING DIAGNOSIS:  Pneumonia    DISCHARGE DIAGNOSIS:  Acute influenza  Pneumonia  Rhinitis    MEDICATIONS:    It is important that you take the medication exactly as they are prescribed. Keep your medication in the bottles provided by the pharmacist and keep a list of the medication names, dosages, and times to be taken in your wallet. Do not take other medications without consulting your doctor. If you experience any of the following symptoms then please call your primary care physician or return to the emergency room if you cannot get hold of your doctor:  Fever, chills, nausea, vomiting, diarrhea, change in mentation, falling, bleeding, shortness of breath    Follow Up:  Please call and set up an appointment to see them in 1 week. Carter Cary MD  [address] [place] [address] [address] [place] [address]  [phone]              Information obtained by :  I understand that if any problems occur once I am at home I am to contact my physician. I understand and acknowledge receipt of the instructions indicated above.                                                                                                                                            Physician's or R.N.'s Signature                                                                  Date/Time                                                                                                                                              Patient or Representative Signature                                                          Date/Time

## 2022-12-14 NOTE — PROGRESS NOTES
Bedside and Verbal shift change report given to Nancy Briceno (oncoming nurse) by Sravani Camp (offgoing nurse). Report included the following information SBAR, Kardex, Intake/Output, MAR, Recent Results, and Cardiac Rhythm SR-Ned.

## 2022-12-14 NOTE — ROUTINE PROCESS
PHYSICAL THERAPY EVALUATION/DISCHARGE  Patient: Clover Cosby (88 y.o. female)  Date: 12/14/2022  Primary Diagnosis: COPD with acute exacerbation ([address] [J44.1]       Precautions:   Fall      ASSESSMENT  Based on the objective data described below, the patient presents with shortness of breath following admission for flu and dyspnea with COPD exacerbation. Patient received on room air and able to tolerate session on room air, see below. She is Independent with all mobility and no loss of balance. Further skilled acute physical therapy is not indicated at this time. .    Functional Outcome Measure: The patient scored 23/28 on the tinetti outcome measure which is indicative of low fall risk. Other factors to consider for discharge: lives with daughter, family to assist          PLAN :  Recommendation for discharge: (in order for the patient to meet his/her long term goals)  No skilled physical therapy/ follow up rehabilitation needs identified at this time. This discharge recommendation:  Has been made in collaboration with the attending provider and/or case management    IF patient discharges home will need the following DME: none       SUBJECTIVE:   Patient stated I am doing better.     OBJECTIVE DATA SUMMARY:   HISTORY:    Past Medical History:   Diagnosis Date    Arthritis     bilateral hands, left knee - pt got injections    Carpal tunnel syndrome     bilateral hands    Dental infection ~1998    progressed to back of neck, pt had sx that consisted of taking bone from her right hip to replace in her spinal coloum; pt in halo for 3 months    Depression     Hypercholesterolemia     Menopause      Past Surgical History:   Procedure Laterality Date    HX CARPAL TUNNEL RELEASE Right 11/01/2018    open    HX HYSTERECTOMY      HX OOPHORECTOMY      HX TONSILLECTOMY      HX UROLOGICAL  ~1998    Back surgery:  pt reports she had dental inf. that went to back of neck and ortho surgeon took pieces of bone from right hip to replace in spinal column; pt reports she wore halo for 3 months:  Dr. Nitin Youngblood (sx done at [facility])       Prior level of function: see above  Personal factors and/or comorbidities impacting plan of care: see below    Home Situation  Home Environment: Private residence  # Steps to Enter: 3  One/Two Story Residence: One story  Living Alone: No  Support Systems: Child(sarah), Other Family Member(s)  Patient Expects to be Discharged to[de-identified] Home with family assistance  Current DME Used/Available at Home: None    EXAMINATION/PRESENTATION/DECISION MAKING:   Critical Behavior:  Neurologic State: Alert, Eyes open spontaneously  Orientation Level: Oriented X4  Cognition: Appropriate decision making, Appropriate for age attention/concentration, Appropriate safety awareness, Follows commands     Hearing: Auditory  Auditory Impairment: None  Skin:  all exposed intact  Edema: none noted  Range Of Motion:  AROM: Within functional limits           PROM: Within functional limits           Strength:    Strength:  Within functional limits                    Tone & Sensation:   Tone: Normal                              Coordination:  Coordination: Within functional limits  Vision:      Functional Mobility:  Bed Mobility:  Rolling: Independent  Supine to Sit: Independent        Transfers:  Sit to Stand: Independent  Stand to Sit: Independent        Bed to Chair: Independent              Balance:   Sitting: Intact  Standing: Intact  Ambulation/Gait Training:  Distance (ft): 75 Feet (ft)  Assistive Device: Gait belt  Ambulation - Level of Assistance: Independent     Gait Description (WDL): Exceptions to WDL                                Therapeutic Exercises:   Seated: hip flexion, LAQ, ankle pumps  2 sets of 10    Functional Measure:  Tinetti test:    Sitting Balance: 1  Arises: 1  Attempts to Rise: 2  Immediate Standing Balance: 1  Standing Balance: 1  Nudged: 1  Eyes Closed: 1  Turn 360 Degrees - Continuous/Discontinuous: 1  Turn 360 Degrees - Steady/Unsteady: 1  Sitting Down: 2  Balance Score: 12 Balance total score  Indication of Gait: 1  R Step Length/Height: 1  L Step Length/Height: 1  R Foot Clearance: 1  L Foot Clearance: 1  Step Symmetry: 1  Step Continuity: 1  Path: 1  Trunk: 2  Walking Time: 1  Gait Score: 11 Gait total score  Total Score: 23/28 Overall total score         Tinetti Tool Score Risk of Falls  <19 = High Fall Risk  19-24 = Moderate Fall Risk  25-28 = Low Fall Risk  Tinetti ME. Performance-Oriented Assessment of Mobility Problems in Elderly Patients. [facility]; [id number]. (Scoring Description: PT Bulletin Feb. 10, 1993)    Older adults: Estrellita Mckeon et al, 2009; n = 1000 [facility] elderly evaluated with ABC, HERNESTO, ADL, and IADL)  · Mean HERNESTO score for males aged 69-68 years = 26.21(3.40)  · Mean HERNESTO score for females age 69-68 years = 25.16(4.30)  · Mean HERNESTO score for males over 80 years = 23.29(6.02)  · Mean HERNESTO score for females over 80 years = 17.20(8.32)           Physical Therapy Evaluation Charge Determination   History Examination Presentation Decision-Making   MEDIUM  Complexity : 1-2 comorbidities / personal factors will impact the outcome/ POC  LOW Complexity : 1-2 Standardized tests and measures addressing body structure, function, activity limitation and / or participation in recreation  LOW Complexity : Stable, uncomplicated  Other outcome measures tinetti  LOW       Based on the above components, the patient evaluation is determined to be of the following complexity level: LOW     Pain Rating:  None reported    Activity Tolerance:   Good      After treatment patient left in no apparent distress:   Sitting in chair, Call bell within reach, and Caregiver / family present    COMMUNICATION/EDUCATION:   The patients plan of care was discussed with: Registered nurse.      Fall prevention education was provided and the patient/caregiver indicated understanding., Patient/family have participated as able in goal setting and plan of care. , and Patient/family agree to work toward stated goals and plan of care.     Thank you for this referral.  Floydene Sessions, PT, DPT   Time Calculation: 21 mins

## 2022-12-14 NOTE — PROGRESS NOTES
0700 Bedside and Verbal shift change report given to PATRICIA HOOKS  (oncoming nurse) by Fan Hernandez RN  (offgoing nurse). Report included the following information SBAR, Kardex, Intake/Output, MAR, Accordion, Recent Results, Med Rec Status, and Cardiac Rhythm NSR/SB . This patient was assisted with Intentional Toileting every 2 hours during this shift as appropriate. Documentation of ambulation and output reflected on Flowsheet as appropriate. Purposeful hourly rounding was completed using AIDET and 5Ps. Outcomes of PHR documented as they occurred. Bed alarm in use as appropriate. Dual Suction and ambubag in place. Jerad Notified MD of patient's K level. Inquired about ordering PO replacement. I have reviewed discharge instructions with the patient. The patient verbalized understanding. 36 Reached out to MD to have [facility] [address] [facility]. MD stated, \"OK. \"

## 2022-12-14 NOTE — PROGRESS NOTES
Comprehensive Nutrition Assessment    Type and Reason for Visit: Initial, Positive nutrition screen    Nutrition Recommendations/Plan:   Continue regular diet order  Provide Ensure Enlive TID (1050 kcal, 132 g carbs, 60 g protein) to aid in meeting kcal/protein needs. Obtain measured weight      Malnutrition Assessment:  Malnutrition Status:  Insufficient data (12/14/22 1520)  - unable to perform Nutrition Focused Physical Exam at this time  Context:  Acute illness     Findings of the 6 clinical characteristics of malnutrition:   Energy Intake:  Unable to assess  Weight Loss:  Unable to assess     Body Fat Loss:  Unable to assess,     Muscle Mass Loss:  Unable to assess,    Fluid Accumulation:  No significant fluid accumulation,     Strength:  Not performed     Nutrition Assessment:     Pt is an [de-identified]year old female admitted with COPD with acute exacerbation ([facility] [address] [J44.1]. She  has a past medical history of Arthritis, Carpal tunnel syndrome, Dental infection (~1998), Depression, Hypercholesterolemia, and Menopause. BPA for wt loss 2-13# and poor appetite. ONS already ordered per nursing MST. Patient on droplet+ precautions at this time - RD called into room, no answer. Chart reviewed. Admission weight is stated - per documentation, patient has lost 5# (3.8%) x 6 months, not clinically significant for timeframe. NKFA. No noted chewing/swallowing problems. No noted vomiting/diarrhea but reported nausea yesterday. K slightly low @ 3.4. On 2L O2 per flowsheets.     Wt Readings from Last 10 Encounters:   12/13/22 56.7 kg (125 lb)   10/31/22 56.7 kg (125 lb)   03/17/22 59 kg (130 lb)   03/14/22 59 kg (130 lb)   08/10/21 58.6 kg (129 lb 3 oz)   07/29/21 59.4 kg (131 lb)   01/13/21 62.1 kg (137 lb)   01/11/21 61.2 kg (135 lb)   01/11/21 62.1 kg (137 lb)   11/25/20 62.3 kg (137 lb 6.4 oz)       Documented Meal intake:  Patient Vitals for the past 168 hrs:   % Diet Eaten   12/14/22 1015 51 - 75%       Documentation of supplement intake:  No data found. Nutrition Related Findings:      Wound Type: None  Abdominal Assessment: Intact  Appetite: Fair  Bowel Sounds: Active   Edema:No data recorded    Nutr. Labs:    Lab Results   Component Value Date/Time    GFR est AA >60 03/14/2022 12:23 PM    GFR est non-AA >60 03/14/2022 12:23 PM    Creatinine 0.73 12/14/2022 02:58 AM    BUN 12 12/14/2022 02:58 AM    Sodium 142 12/14/2022 02:58 AM    Potassium 3.4 (L) 12/14/2022 02:58 AM    Chloride 108 12/14/2022 02:58 AM    CO2 25 12/14/2022 02:58 AM       Lab Results   Component Value Date/Time    Glucose 181 (H) 12/14/2022 02:58 AM       Lab Results   Component Value Date/Time    Hemoglobin A1c 5.8 (H) 09/30/2020 03:11 PM       Nutr. Meds:  Lipitor, Lovenox, zofran PRN, miralax PRN      Current Nutrition Intake & Therapies:  Average Meal Intake: 51-75%  Average Supplement Intake: Unable to assess  ADULT DIET Regular  ADULT ORAL NUTRITION SUPPLEMENT AM Snack, PM Snack, HS Snack; Standard High Calorie/High Protein    Anthropometric Measures:  Height: 5' 2.99\" (160 cm)  Ideal Body Weight (IBW): 115 lbs (52 kg)     Current Body Wt:  56.7 kg (125 lb), 108.7 % IBW.  Stated  Current BMI (kg/m2): 22.1        Weight Adjustment: No adjustment                 BMI Category: Normal weight (BMI 22.0-24.9) age over 72    Estimated Daily Nutrient Needs:  Energy Requirements Based On: Formula  Weight Used for Energy Requirements: Current  Energy (kcal/day): 1310 (MSJ x 1.3)  Weight Used for Protein Requirements: Current  Protein (g/day): 68 (1.2 g/kg)  Method Used for Fluid Requirements: Other (comment) (Minimum for older adults)  Fluid (ml/day): 1500    Nutrition Diagnosis:   Increased nutrient needs related to catabolic illness as evidenced by  (COVID-19)    Nutrition Interventions:   Food and/or Nutrient Delivery: Continue current diet, Continue oral nutrition supplement  Nutrition Education/Counseling: No recommendations at this time  Coordination of Nutrition Care: Continue to monitor while inpatient       Goals:     Goals: by next RD assessment, PO intake 75% or greater       Nutrition Monitoring and Evaluation:   Behavioral-Environmental Outcomes: None identified  Food/Nutrient Intake Outcomes: Food and nutrient intake  Physical Signs/Symptoms Outcomes: Biochemical data, Weight    Discharge Planning:     Too soon to determine    Olesya Barton MS, RD  Contact: Ext: [phone], or via [employer]

## 2022-12-15 ENCOUNTER — PATIENT OUTREACH (OUTPATIENT)
Dept: CASE MANAGEMENT | Age: 82
End: 2022-12-15

## 2022-12-15 LAB
FLUID CULTURE, SPNG2: NORMAL
ORGANISM ID, SPNG3: NORMAL
PLEASE NOTE, SPNG4: NORMAL
S PNEUM AG SPEC QL LA: NEGATIVE
SPECIMEN SOURCE: NORMAL
SPECIMEN, SPNG1: NORMAL

## 2022-12-15 NOTE — ACP (ADVANCE CARE PLANNING)
Advance Care Planning:   Does patient have an Advance Directive: patient has medical POA not on file. Yakov Delgado will provide Dr. Sabina Mariscal office with copy to be scanned into EMR.     Primary Decision Maker: Rohan Arn - [phone]  Secondary- Jackson (daughter)

## 2022-12-15 NOTE — PROGRESS NOTES
Care Transitions Initial Call    Call within 2 business days of discharge: Yes     Patient: Pastora Dale Patient : 1940 MRN: [id number]    Last Discharge [address]       Date Complaint Diagnosis Description Type Department Provider    22 Cough; Other Chronic obstructive pulmonary disease with acute exacerbation ([facility] [address] . .. ED to Hosp-Admission (Discharged) (ADMIT) Latrice Singh MD; Magnant, Ch... Was this an external facility discharge? No Discharge Facility: n/a    Challenges to be reviewed by the provider   Additional needs identified to be addressed with provider:     CELESTE Gao will schedule PCP hospital follow up appointment         Method of communication with provider : none    Discussed COVID-19 related testing which was not done at this time. Test results were not done. Patient informed of results, if available? N/A     Advance Care Planning:   Does patient have an Advance Directive:  patient has medical POA not on file. Karen Hillman will provide Dr. Nilton Painter office with copy to be scanned into EMR. Primary Decision Maker: Silas Tavares - [phone]  Secondary- Luzma Blanco (daughter)    Inpatient Readmission Risk score: Unplanned Readmit Risk Score: 8    Was this a readmission? no   Patient stated reason for the admission: SOB, weakness, cough    Patients top risk factors for readmission: functional cognitive ability and medical condition-alzheimers, recent flu/PNA    Interventions to address risk factors: Scheduled appointment with PCP-RIGOBERTO will schedule, has [phone] heaton as resource and Obtained and reviewed discharge summary and/or continuity of care documents    Care Transition Nurse (CTN) contacted the family by telephone to perform post hospital discharge assessment. Verified name and  with family as identifiers. Provided introduction to self, and explanation of the CTN role.      CTN reviewed discharge instructions, medical action plan and red flags with family who verbalized understanding. Were discharge instructions available to patient? yes. Reviewed appropriate site of care based on symptoms and resources available to patient including: PCP, Urgent Care Clinics, and [facility] . Family given an opportunity to ask questions and does not have any further questions or concerns at this time. The family agrees to contact the PCP office for questions related to their healthcare. Medication reconciliation was performed with family, who verbalizes understanding of administration of home medications. Referral to Pharm D needed: no     Home Health/Outpatient orders at discharge: none     Durable Medical Equipment ordered at discharge: None     Discussed follow-up appointments. If no appointment was previously scheduled, appointment scheduling offered: yes. Is follow up appointment scheduled within 7 days of discharge? Family will schedule . [facility] follow up appointment(s):   Future Appointments   Date Time Provider Chilo Daigle   1/26/2023 10:00 AM Amie Padron DO [facility] BS AMB   2/27/2023  3:30 PM Louie Salinas MD [facility] BS AMB     Non-[facility] follow up appointment(s): none listed    Plan for follow-up call in 5-7 days based on severity of symptoms and risk factors. Plan for next call: symptom management-monitor red flags and follow up appointment-schedule follow up  CTN provided contact information for future needs. Goals Addressed                   This Visit's Progress     Prevent complications post hospitalization. 12/15/22  Activity- activity intolerance/energy conservation/frequent rest. 24/7 supervision. Medication compliance- levaquin 5 day course. Some loose stools today. Will avoid taking on empty stomach and no dairy 2 hours before or after medication. Stick with bland diet for now, if continues or worsens will reach out to PCP office.   Patient will monitor/report red flags- increased SOB, wheezing, worsening cough, excess phlegm/mucus, increased fatigue, using relief inhaler/nebulizer more often/medication not helping.

## 2022-12-22 ENCOUNTER — PATIENT OUTREACH (OUTPATIENT)
Dept: CASE MANAGEMENT | Age: 82
End: 2022-12-22

## 2022-12-22 NOTE — PROGRESS NOTES
Care Transitions Follow Up Call    Attempted to reach patient for follow up today. Unable to reach patient, LVMM. Patient: Dwight Gonzalez Patient : 1940 MRN: [id number]    Last Discharge  Street       Date Complaint Diagnosis Description Type Department Provider    22 Cough; Other Chronic obstructive pulmonary disease with acute exacerbation ([facility] [address] . .. ED to Hosp-Admission (Discharged) (ADMIT) Bhargavi Christensen MD; Magnant, Ch...           Noted following upcoming appointments from discharge chart review:   [facility] follow up appointment(s):   Future Appointments   Date Time Provider Chilo Daigle   2023  3:00 PM Travis Morley MD [facility] BS AMB   2023 10:00 AM Amie Padron DO [facility] BS AMB   2023  3:30 PM Travis Morley MD [facility] BS AMB

## 2022-12-30 ENCOUNTER — PATIENT OUTREACH (OUTPATIENT)
Dept: CASE MANAGEMENT | Age: 82
End: 2022-12-30

## 2022-12-30 NOTE — PROGRESS NOTES
Care Transitions Follow Up Call    Attempted to reach patient for follow up today. Unable to reach patient, LVMM. Patient: Rachelle Trujillo Patient : 1940 MRN: [id number]    Last Discharge  Street       Date Complaint Diagnosis Description Type Department Provider    22 Cough; Other Chronic obstructive pulmonary disease with acute exacerbation ([facility] [address] . .. ED to Hosp-Admission (Discharged) (ADMIT) Joellen Calero MD; Magnant, Ch. ..               Noted following upcoming appointments from discharge chart review:   [facility] follow up appointment(s):   Future Appointments   Date Time Provider Chilo Daigle   2023  3:00 PM Appdavid Jones MD [facility] BS AMB   2023 10:00 AM Amie Padron DO [facility] BS AMB   2023  3:30 PM Appdavid Jones MD [facility] BS AMB

## 2023-01-26 ENCOUNTER — OFFICE VISIT (OUTPATIENT)
Dept: NEUROLOGY | Age: 83
End: 2023-01-26
Payer: MEDICARE

## 2023-01-26 VITALS
HEIGHT: 63 IN | DIASTOLIC BLOOD PRESSURE: 60 MMHG | TEMPERATURE: 98.5 F | RESPIRATION RATE: 18 BRPM | HEART RATE: 77 BPM | SYSTOLIC BLOOD PRESSURE: 118 MMHG | OXYGEN SATURATION: 95 % | BODY MASS INDEX: 22.08 KG/M2 | WEIGHT: 124.6 LBS

## 2023-01-26 DIAGNOSIS — F01.518 VASCULAR DEMENTIA WITH BEHAVIORAL DISTURBANCE: ICD-10-CM

## 2023-01-26 DIAGNOSIS — R41.3 MEMORY CHANGE: ICD-10-CM

## 2023-01-26 RX ORDER — OLANZAPINE 5 MG/1
5 TABLET ORAL AS NEEDED
COMMUNITY

## 2023-01-26 RX ORDER — QUETIAPINE FUMARATE 50 MG/1
50 TABLET, FILM COATED ORAL
Qty: 120 TABLET | Refills: 3 | Status: SHIPPED | OUTPATIENT
Start: 2023-01-26

## 2023-01-26 NOTE — PROGRESS NOTES
Chief Complaint   Patient presents with    Memory Loss     Follow up -memory has worsened - gets headaches every now and then - advil helps     1. Have you been to the ER, urgent care clinic since your last visit? Hospitalized since your last visit? Yes [facility] for pneumonia and [facility] for alzheimer's related      2. Have you seen or consulted any other health care providers outside of the [address] [place] [address] since your last visit? Include any pap smears or colon screening.  Yes see above

## 2023-01-26 NOTE — PROGRESS NOTES
Neurology Note    Patient ID:  Lucia Atkinson  [id number]  51 y.o.  1940      Date of Consultation:  January 26, 2023      Assessment and Plan:  [de-identified] female presenting with Alzheimer's disease on Aricept with some behavioral components, primarily depression and occasional hallucinations, on Lexapro and Seroquel presenting as a follow-up. She has been doing well on her current medication regimen. I do recommend that at this time as she is taking 50 mg twice daily of Seroquel, we can decrease this to 50 mg at bedtime and see how she does. If the hallucinations return we will need to increase the dose. Recommendations:  - Continue Aricept 10 mg tablet every night for dementia  - Switch Seroquel to only 50 mg at bedtime      Problem was discussed at length with the patient. We reviewed the results of the study in detail. We also discussed prognosis and treatment options. The patient had opportunity today to ask all questions, expressed understanding of the instructions provided, and agreed with the plan of treatment. Follow up in 6 to 8 months. I spent 60 minutes providing care to this patient with >50% of the time spent counseling. History of Present Illness:   Lucia Atkinson is a [de-identified] y.o. female with history of carpal tunnel syndrome, arthritis, depression, hyperlipidemia, probable Alzheimer's type versus vascular dementia presenting for follow-up. Patient was a former patient of Dr. Jonna Allred.  Patient's granddaughter is with her at the appointment helps provide the history. Today, she states that there have been a lot of changes since she was last seen. She has since  from her  and is now living at an assisted living facility where her granddaughter can easily check on her. She has been otherwise doing well. She had a hospitalization for pneumonia the last 2 months and is still recovering from this.   During her hospitalization, her Seroquel was increased from 12.5 mg twice daily to 50 mg twice daily. Since then, the patient has been on this new dose and feels that her hallucinations have not returned however it may be slightly high compared to what her last dosing was. She is on Lexapro and has been doing well with this. She has not had any other significant memory decline and she does feel that the Aricept has calmed her down somewhat. Past Medical History:   Diagnosis Date    Arthritis     bilateral hands, left knee - pt got injections    Carpal tunnel syndrome     bilateral hands    Dental infection ~1998    progressed to back of neck, pt had sx that consisted of taking bone from her right hip to replace in her spinal coloum; pt in halo for 3 months    Depression     Hypercholesterolemia     Menopause         Past Surgical History:   Procedure Laterality Date    HX CARPAL TUNNEL RELEASE Right 11/01/2018    open    HX HYSTERECTOMY      HX OOPHORECTOMY      HX TONSILLECTOMY      HX UROLOGICAL  ~1998    Back surgery:  pt reports she had dental inf. that went to back of neck and ortho surgeon took pieces of bone from right hip to replace in spinal column; pt reports she wore halo for 3 months:  Dr. Gordon Nelson (sx done at [facility])        Family History   Problem Relation Age of Onset    Cancer Daughter     Parkinson's Disease Mother     Coronary Art Dis Mother     Coronary Art Dis Brother     High Cholesterol Brother         Social History     Tobacco Use    Smoking status: Former     Packs/day: 1.00     Years: 20.00     Pack years: 20.00     Types: Cigarettes     Quit date: 2008     Years since quitting: 15.0    Smokeless tobacco: Never   Substance Use Topics    Alcohol use: Yes     Alcohol/week: 3.0 standard drinks     Types: 3 Glasses of wine per week     Comment: weekly        Allergies   Allergen Reactions    Penicillins Shortness of Breath and Rash        Prior to Admission medications    Medication Sig Start Date End Date Taking?  Authorizing Provider   albuterol sulfate 90 mcg/actuation aebs Take 2 Puffs by inhalation every four (4) hours as needed for Shortness of Breath. 12/14/22   Jarrett Mauro MD   fluticasone propionate (Flonase Allergy Relief) 50 mcg/actuation nasal spray 2 Sprays by Both Nostrils route daily as needed for Rhinitis. 12/14/22   Jarrett Mauro MD   levoFLOXacin (Levaquin) 750 mg tablet Take 1 Tablet by mouth daily. 12/14/22   Jarrett Mauro MD   QUEtiapine (SEROquel) 25 mg tablet Take 0.5 Tablets by mouth nightly. 10/31/22   Travis Power MD   donepeziL (ARICEPT) 10 mg tablet TAKE 1 TABLET BY MOUTH EVERY NIGHT 10/29/22   Bubba Cordoba MD   escitalopram oxalate (LEXAPRO) 10 mg tablet Take 1 Tablet by mouth daily (with dinner). 8/9/22   Sara Ramos MD   atorvastatin (LIPITOR) 40 mg tablet TAKE 1 TABLET BY MOUTH ONCE DAILY 5/6/22   Travis Power MD   ibuprofen (MOTRIN) 200 mg tablet Take 200 mg by mouth as needed for Pain. Patient not taking: Reported on 12/15/2022    Provider, Historical       Review of Systems:    General, constitutional: negative  Eyes, vision: negative  Ears, nose, throat: negative  Cardiovascular, heart: negative  Respiratory: negative  Gastrointestinal: negative  Genitourinary: negative  Musculoskeletal: negative  Skin and integumentary: negative  Psychiatric: negative  Endocrine: negative  Neurological: negative, except for HPI  Hematologic/lymphatic: negative  Allergy/immunology: negative    []Unable to obtain  ROS due to  []mental status change  []sedated   []intubated    Objective: There were no vitals taken for this visit. Physical Exam:  General:  appears well nourished in no acute distress  Neck: no obvious deformity or masses  Lungs: comfortable on room air  Heart:  well-perfused   Lower extremity: no edema  Skin: intact    Neurological exam:  Awake, alert, oriented to person, place, unable to tell me the month and year.   Does not know the president  Attention and concentration were intact. Able to tell me the days of the week backwards  Language was intact. There was no aphasia. Able to repeat phrases. Speech: no dysarthria    Cranial nerves:   II-XII were tested    PERRRLA  Visual fields were full to finger counting   EOMI, no evidence of nystagmus  Facial sensation:  normal and symmetric  Facial motor: normal and symmetric  Hearing intact  SCM strength intact  Tongue: midline without fasciculations    Motor: Tone normal in upper and lower extremities     Strength testing:   deltoid triceps biceps Wrist ext. Wrist flex. intrinsics Hip flex. Hip ext. Knee ext. Knee flex Dorsi flex Plantar flex   Right 5 5 5 5 5 5 5 NT 5 5 5 5   Left 5 5 5 5 5 5 5 NT 5 5 5 5       Sensory:  Intact to LT throughout     Reflexes:    Right Left  Biceps  2 2  Triceps  2 2  Brachiorad. 2 2  Patella  2 2  Achilles absent bilaterally     Cerebellar testing:  no tremor apparent, finger/nose and rapid alternating movements were intact      Gait: steady. Labs:     Lab Results   Component Value Date/Time    Hemoglobin A1c 5.8 (H) 09/30/2020 03:11 PM    Sodium 142 12/14/2022 02:58 AM    Potassium 3.4 (L) 12/14/2022 02:58 AM    Chloride 108 12/14/2022 02:58 AM    Glucose 181 (H) 12/14/2022 02:58 AM    BUN 12 12/14/2022 02:58 AM    Creatinine 0.73 12/14/2022 02:58 AM    Calcium 8.4 (L) 12/14/2022 02:58 AM    WBC 8.4 12/14/2022 02:58 AM    HCT 37.9 12/14/2022 02:58 AM    HGB 12.7 12/14/2022 02:58 AM    PLATELET 479 53/38/2276 02:58 AM       Imaging:    Results from [place] encounter on 12/17/20    MRI BRAIN WO CONT    Narrative  BRAIN MRI WITHOUT CONTRAST: 12/17/2020 10:31 AM    INDICATION: Cognitive impairment, amnesia, memory change. COMPARISON: None. TECHNIQUE: Images were obtained in multiple planes and sequences to emphasize  T1, T2, and T2* information. Diffusion-weighted images and ADC maps were also  obtained. FINDINGS: The ventricles and sulci are appropriate for age. The main  intracranial flow-voids are normal. A few, scattered, periventricular and  subcortical white matter signal abnormalities are consistent with minimal  chronic small vessel ischemic disease. This is within the range of normal for  age. No restricted diffusion to suggest acute infarction. No hemorrhage. Impression  IMPRESSION: No acute intracranial abnormality. Results from [place] encounter on 12/13/22    CT CHEST WO CONT    Narrative  INDICATION: Possible pneumonia. COMPARISON: October 16, 2020    CONTRAST: None. TECHNIQUE:  5 mm axial images were obtained through the chest. Coronal and  sagittal reformats were generated. CT dose reduction was achieved through use  of a standardized protocol tailored for this examination and automatic exposure  control for dose modulation. The absence of intravenous contrast reduces the sensitivity for evaluation of  the mediastinum, arleen, vasculature, and upper abdominal organs. FINDINGS:    CHEST WALL: No mass or axillary lymphadenopathy. THYROID: No nodule. MEDIASTINUM: No mass or lymphadenopathy. ARLEEN: No mass or lymphadenopathy. THORACIC AORTA: No aneurysm. MAIN PULMONARY ARTERY: Normal in caliber. TRACHEA/BRONCHI: Patent. ESOPHAGUS: No wall thickening or dilatation. HEART: Normal in size. PLEURA: No effusion or pneumothorax. LUNGS: Stable left lung nodule. Right perihilar and lung base nodular  infiltrates acute. INCIDENTALLY IMAGED UPPER ABDOMEN: No significant abnormality in the  incidentally imaged upper abdomen. BONES: No destructive bone lesion. Impression  Right lung infiltrates.              Patient Active Problem List   Diagnosis Code    Liver mass R16.0    Cognitive impairment R41.89    Alzheimer's disease, unspecified G30.9    Vascular dementia with behavioral disturbance F01.518    COPD with acute exacerbation ([facility] J44.1                   Signed By:   Samantha Valdez DO  Neurophysiology      January 26, 2023

## 2023-04-29 RX ORDER — ATORVASTATIN CALCIUM 40 MG/1
TABLET, FILM COATED ORAL
COMMUNITY
Start: 2022-05-06

## 2023-04-29 RX ORDER — DONEPEZIL HYDROCHLORIDE 10 MG/1
TABLET, FILM COATED ORAL
COMMUNITY
Start: 2022-10-29

## 2023-04-29 RX ORDER — QUETIAPINE FUMARATE 50 MG/1
50 TABLET, FILM COATED ORAL
COMMUNITY
Start: 2023-01-26

## 2023-04-29 RX ORDER — FLUTICASONE PROPIONATE 50 MCG
2 SPRAY, SUSPENSION (ML) NASAL DAILY PRN
COMMUNITY
Start: 2022-12-14

## 2023-04-29 RX ORDER — ESCITALOPRAM OXALATE 10 MG/1
10 TABLET ORAL
COMMUNITY
Start: 2022-08-09

## 2023-04-29 RX ORDER — IBUPROFEN 200 MG
200 TABLET ORAL PRN
COMMUNITY

## 2023-04-29 RX ORDER — OLANZAPINE 5 MG/1
5 TABLET ORAL PRN
COMMUNITY

## 2024-05-15 ENCOUNTER — APPOINTMENT (OUTPATIENT)
Facility: HOSPITAL | Age: 84
End: 2024-05-15
Payer: MEDICARE

## 2024-05-15 ENCOUNTER — HOSPITAL ENCOUNTER (EMERGENCY)
Facility: HOSPITAL | Age: 84
Discharge: HOME | End: 2024-05-15
Attending: EMERGENCY MEDICINE
Payer: MEDICARE

## 2024-05-15 VITALS
DIASTOLIC BLOOD PRESSURE: 100 MMHG | HEIGHT: 64 IN | HEART RATE: 68 BPM | WEIGHT: 143.96 LBS | TEMPERATURE: 99 F | BODY MASS INDEX: 24.58 KG/M2 | RESPIRATION RATE: 17 BRPM | SYSTOLIC BLOOD PRESSURE: 175 MMHG | OXYGEN SATURATION: 96 %

## 2024-05-15 DIAGNOSIS — R42 DIZZINESS: Primary | ICD-10-CM

## 2024-05-15 LAB
ALBUMIN SERPL-MCNC: 3.3 G/DL (ref 3.5–5)
ALBUMIN/GLOB SERPL: 0.9 (ref 1.1–2.2)
ALP SERPL-CCNC: 118 U/L (ref 45–117)
ALT SERPL-CCNC: 70 U/L (ref 12–78)
ANION GAP SERPL CALC-SCNC: 6 MMOL/L (ref 5–15)
APPEARANCE UR: CLEAR
AST SERPL-CCNC: 47 U/L (ref 15–37)
BACTERIA URNS QL MICRO: NEGATIVE /HPF
BASOPHILS # BLD: 0 K/UL (ref 0–0.1)
BASOPHILS NFR BLD: 1 % (ref 0–1)
BILIRUB SERPL-MCNC: 0.4 MG/DL (ref 0.2–1)
BILIRUB UR QL: NEGATIVE
BUN SERPL-MCNC: 19 MG/DL (ref 6–20)
BUN/CREAT SERPL: 19 (ref 12–20)
CALCIUM SERPL-MCNC: 8.5 MG/DL (ref 8.5–10.1)
CHLORIDE SERPL-SCNC: 107 MMOL/L (ref 97–108)
CO2 SERPL-SCNC: 28 MMOL/L (ref 21–32)
COLOR UR: NORMAL
CREAT SERPL-MCNC: 1.02 MG/DL (ref 0.55–1.02)
DIFFERENTIAL METHOD BLD: NORMAL
EOSINOPHIL # BLD: 0.1 K/UL (ref 0–0.4)
EOSINOPHIL NFR BLD: 3 % (ref 0–7)
EPITH CASTS URNS QL MICRO: NORMAL /LPF
ERYTHROCYTE [DISTWIDTH] IN BLOOD BY AUTOMATED COUNT: 13.4 % (ref 11.5–14.5)
GLOBULIN SER CALC-MCNC: 3.8 G/DL (ref 2–4)
GLUCOSE SERPL-MCNC: 123 MG/DL (ref 65–100)
GLUCOSE UR STRIP.AUTO-MCNC: NEGATIVE MG/DL
HCT VFR BLD AUTO: 43.7 % (ref 35–47)
HGB BLD-MCNC: 14.8 G/DL (ref 11.5–16)
HGB UR QL STRIP: NEGATIVE
IMM GRANULOCYTES # BLD AUTO: 0 K/UL (ref 0–0.04)
IMM GRANULOCYTES NFR BLD AUTO: 0 % (ref 0–0.5)
KETONES UR QL STRIP.AUTO: NEGATIVE MG/DL
LEUKOCYTE ESTERASE UR QL STRIP.AUTO: NEGATIVE
LYMPHOCYTES # BLD: 2.1 K/UL (ref 0.8–3.5)
LYMPHOCYTES NFR BLD: 41 % (ref 12–49)
MAGNESIUM SERPL-MCNC: 1.8 MG/DL (ref 1.6–2.4)
MCH RBC QN AUTO: 30.7 PG (ref 26–34)
MCHC RBC AUTO-ENTMCNC: 33.9 G/DL (ref 30–36.5)
MCV RBC AUTO: 90.7 FL (ref 80–99)
MONOCYTES # BLD: 0.4 K/UL (ref 0–1)
MONOCYTES NFR BLD: 7 % (ref 5–13)
NEUTS SEG # BLD: 2.5 K/UL (ref 1.8–8)
NEUTS SEG NFR BLD: 48 % (ref 32–75)
NITRITE UR QL STRIP.AUTO: NEGATIVE
NRBC # BLD: 0 K/UL (ref 0–0.01)
NRBC BLD-RTO: 0 PER 100 WBC
PH UR STRIP: 6 (ref 5–8)
PLATELET # BLD AUTO: 210 K/UL (ref 150–400)
PMV BLD AUTO: 9.1 FL (ref 8.9–12.9)
POTASSIUM SERPL-SCNC: 4.2 MMOL/L (ref 3.5–5.1)
PROT SERPL-MCNC: 7.1 G/DL (ref 6.4–8.2)
PROT UR STRIP-MCNC: NEGATIVE MG/DL
RBC # BLD AUTO: 4.82 M/UL (ref 3.8–5.2)
RBC #/AREA URNS HPF: NORMAL /HPF (ref 0–5)
SODIUM SERPL-SCNC: 141 MMOL/L (ref 136–145)
SP GR UR REFRACTOMETRY: 1.02 (ref 1–1.03)
TROPONIN I SERPL HS-MCNC: 5 NG/L (ref 0–51)
URINE CULTURE IF INDICATED: NORMAL
UROBILINOGEN UR QL STRIP.AUTO: 0.2 EU/DL (ref 0.2–1)
WBC # BLD AUTO: 5.2 K/UL (ref 3.6–11)
WBC URNS QL MICRO: NORMAL /HPF (ref 0–4)

## 2024-05-15 PROCEDURE — 93005 ELECTROCARDIOGRAM TRACING: CPT | Performed by: EMERGENCY MEDICINE

## 2024-05-15 PROCEDURE — 87086 URINE CULTURE/COLONY COUNT: CPT

## 2024-05-15 PROCEDURE — 71045 X-RAY EXAM CHEST 1 VIEW: CPT

## 2024-05-15 PROCEDURE — 83735 ASSAY OF MAGNESIUM: CPT

## 2024-05-15 PROCEDURE — 81001 URINALYSIS AUTO W/SCOPE: CPT

## 2024-05-15 PROCEDURE — 80053 COMPREHEN METABOLIC PANEL: CPT

## 2024-05-15 PROCEDURE — 70450 CT HEAD/BRAIN W/O DYE: CPT

## 2024-05-15 PROCEDURE — 99285 EMERGENCY DEPT VISIT HI MDM: CPT

## 2024-05-15 PROCEDURE — 84484 ASSAY OF TROPONIN QUANT: CPT

## 2024-05-15 PROCEDURE — 85025 COMPLETE CBC W/AUTO DIFF WBC: CPT

## 2024-05-15 PROCEDURE — 36415 COLL VENOUS BLD VENIPUNCTURE: CPT

## 2024-05-15 PROCEDURE — 2580000003 HC RX 258: Performed by: EMERGENCY MEDICINE

## 2024-05-15 RX ORDER — SODIUM CHLORIDE, SODIUM LACTATE, POTASSIUM CHLORIDE, AND CALCIUM CHLORIDE .6; .31; .03; .02 G/100ML; G/100ML; G/100ML; G/100ML
1000 INJECTION, SOLUTION INTRAVENOUS ONCE
Status: COMPLETED | OUTPATIENT
Start: 2024-05-15 | End: 2024-05-15

## 2024-05-15 RX ADMIN — SODIUM CHLORIDE, POTASSIUM CHLORIDE, SODIUM LACTATE AND CALCIUM CHLORIDE 1000 ML: 600; 310; 30; 20 INJECTION, SOLUTION INTRAVENOUS at 16:21

## 2024-05-15 ASSESSMENT — PAIN SCALES - GENERAL: PAINLEVEL_OUTOF10: 0

## 2024-05-15 ASSESSMENT — LIFESTYLE VARIABLES: HOW OFTEN DO YOU HAVE A DRINK CONTAINING ALCOHOL: NEVER

## 2024-05-15 NOTE — DISCHARGE INSTRUCTIONS
Routine appointments for health maintenance with a primary care provider are very important and emergency department visits are no substitute.  You should review all findings and test results from your visit today with your primary care physician.        Drink lots of fluids.      Return to the emergency department for any new or concerning signs/symptoms or failure to improve.

## 2024-05-15 NOTE — ED TRIAGE NOTES
Patient presents ambulatory to treatment area.  Patient complains of dizziness that has been going on for about one week, but is now getting worse.  States when she changes position or turns her head, she gets a sharp pain in her anterior head, as well as experiences worsening dizziness.

## 2024-05-15 NOTE — ED PROVIDER NOTES
EMERGENCY DEPARTMENT PHYSICIAN NOTE     Patient: Adela Best     Time of Service: 5/15/2024  2:58 PM     Chief complaint:   Chief Complaint   Patient presents with    Dizziness    Headache        HISTORY:  Patient is a 83 y.o. female who presents to the emergency department with complaints of dizziness, headache.       Past Medical History:   Diagnosis Date    Arthritis     bilateral hands, left knee - pt got injections    Carpal tunnel syndrome     bilateral hands    Dementia (HCC)     Dental infection         Depression     Hypercholesterolemia     Menopause         Past Surgical History:   Procedure Laterality Date    CARPAL TUNNEL RELEASE Right 2018    open    HYSTERECTOMY (CERVIX STATUS UNKNOWN)      OVARY REMOVAL      TONSILLECTOMY      UROLOGICAL SURGERY          US GUIDED LIVER BIOPSY PERCUTANEOUS  2020    US GUIDED LIVER BIOPSY PERCUTANEOUS 2020 [facility] RAD US        Family History   Problem Relation Age of Onset    Cancer Daughter     High Cholesterol Brother     Coronary Art Dis Brother     Coronary Art Dis Mother     Parkinson's Disease Mother         Social History     Socioeconomic History    Marital status:      Spouse name: None    Number of children: None    Years of education: None    Highest education level: None   Tobacco Use    Smoking status: Former     Current packs/day: 0.00     Types: Cigarettes     Quit date: 2008     Years since quittin.3     Passive exposure: Past    Smokeless tobacco: Never   Substance and Sexual Activity    Alcohol use: Yes     Alcohol/week: 3.0 standard drinks of alcohol    Drug use: No        Current Medications: Reviewed in chart.    Allergies:   Allergies   Allergen Reactions    Penicillins Rash and Shortness Of Breath          REVIEW OF SYSTEMS: See HPI for pertinent positives and negatives.      PHYSICAL EXAM:  BP (!) 149/80   Pulse 86   Temp 99 °F (37.2 °C) (Oral)   Resp 20   Ht 1.626 m (5' 4\")   Wt 65.3

## 2024-05-16 LAB
BACTERIA SPEC CULT: NORMAL
SERVICE CMNT-IMP: NORMAL

## 2024-05-17 LAB
EKG ATRIAL RATE: 80 BPM
EKG DIAGNOSIS: NORMAL
EKG P AXIS: 62 DEGREES
EKG P-R INTERVAL: 156 MS
EKG Q-T INTERVAL: 376 MS
EKG QRS DURATION: 62 MS
EKG QTC CALCULATION (BAZETT): 433 MS
EKG R AXIS: 45 DEGREES
EKG T AXIS: 37 DEGREES
EKG VENTRICULAR RATE: 80 BPM

## 2024-05-17 PROCEDURE — 93010 ELECTROCARDIOGRAM REPORT: CPT | Performed by: SPECIALIST

## (undated) DEVICE — GOWN,SIRUS,NONRNF,SETINSLV,XL,20/CS: Brand: MEDLINE

## (undated) DEVICE — 3M™ TEGADERM™ TRANSPARENT FILM DRESSING FRAME STYLE, 1624W, 2-3/8 IN X 2-3/4 IN (6 CM X 7 CM), 100/CT 4CT/CASE: Brand: 3M™ TEGADERM™

## (undated) DEVICE — STERILE POLYISOPRENE POWDER-FREE SURGICAL GLOVES: Brand: PROTEXIS

## (undated) DEVICE — KENDALL DL ECG CABLE AND LEAD WIRE SYSTEM, 3-LEAD, SINGLE PATIENT USE: Brand: KENDALL

## (undated) DEVICE — BIPOLAR FORCEPS CORD: Brand: VALLEYLAB

## (undated) DEVICE — SOLUTION IV 1000ML 0.9% SOD CHL

## (undated) DEVICE — HAND I-LF: Brand: MEDLINE INDUSTRIES, INC.

## (undated) DEVICE — (D)PREP SKN CHLRAPRP APPL 26ML -- CONVERT TO ITEM 371833

## (undated) DEVICE — SUT ETHLN 3-0 18IN PS2 BLK --

## (undated) DEVICE — PADDING CST 4IN STERILE --

## (undated) DEVICE — INFECTION CONTROL KIT SYS

## (undated) DEVICE — LIGHT HANDLE: Brand: DEVON

## (undated) DEVICE — SOLUTION LACTATED RINGERS INJECTION USP

## (undated) DEVICE — BLADE OPHTH MINI BEAV SHRP --

## (undated) DEVICE — STERILE POLYISOPRENE POWDER-FREE SURGICAL GLOVES WITH EMOLLIENT COATING: Brand: PROTEXIS

## (undated) DEVICE — OCCLUSIVE GAUZE STRIP,3% BISMUTH TRIBROMOPHENATE IN PETROLATUM BLEND: Brand: XEROFORM

## (undated) DEVICE — CONTINU-FLO SOLUTION SET, 2 INJECTION SITES, MALE LUER LOCK ADAPTER WITH RETRACTABLE COLLAR, LARGE BORE STOPCOCK WITH ROTATING MALE LUER LOCK EXTENSION SET, 2 INJECTION SITES, MALE LUER LOCK ADAPTER WITH RETRACTABLE COLLAR: Brand: INTERLINK/CONTINU-FLO

## (undated) DEVICE — HOOK LOCK LATEX FREE ELASTIC BANDAGE 2INX5YD